# Patient Record
Sex: MALE | Race: BLACK OR AFRICAN AMERICAN | NOT HISPANIC OR LATINO | Employment: OTHER | URBAN - METROPOLITAN AREA
[De-identification: names, ages, dates, MRNs, and addresses within clinical notes are randomized per-mention and may not be internally consistent; named-entity substitution may affect disease eponyms.]

---

## 2017-02-16 ENCOUNTER — APPOINTMENT (OUTPATIENT)
Dept: ENDOCRINOLOGY | Facility: MEDICAL CENTER | Age: 52
End: 2017-02-16
Payer: MEDICAID

## 2017-03-05 ENCOUNTER — HOSPITAL ENCOUNTER (EMERGENCY)
Facility: MEDICAL CENTER | Age: 52
End: 2017-03-05
Attending: EMERGENCY MEDICINE
Payer: MEDICAID

## 2017-03-05 VITALS
DIASTOLIC BLOOD PRESSURE: 98 MMHG | SYSTOLIC BLOOD PRESSURE: 162 MMHG | WEIGHT: 216.49 LBS | BODY MASS INDEX: 25.56 KG/M2 | RESPIRATION RATE: 16 BRPM | HEIGHT: 77 IN | OXYGEN SATURATION: 96 % | TEMPERATURE: 97.8 F | HEART RATE: 100 BPM

## 2017-03-05 DIAGNOSIS — L84 PRE-ULCERATIVE CORN OR CALLOUS: ICD-10-CM

## 2017-03-05 DIAGNOSIS — G89.29 CHRONIC BILATERAL LOW BACK PAIN WITHOUT SCIATICA: ICD-10-CM

## 2017-03-05 DIAGNOSIS — M54.50 CHRONIC BILATERAL LOW BACK PAIN WITHOUT SCIATICA: ICD-10-CM

## 2017-03-05 PROCEDURE — 99283 EMERGENCY DEPT VISIT LOW MDM: CPT

## 2017-03-05 PROCEDURE — A9270 NON-COVERED ITEM OR SERVICE: HCPCS | Performed by: EMERGENCY MEDICINE

## 2017-03-05 PROCEDURE — 700102 HCHG RX REV CODE 250 W/ 637 OVERRIDE(OP): Performed by: EMERGENCY MEDICINE

## 2017-03-05 RX ORDER — TRAMADOL HYDROCHLORIDE 50 MG/1
50 TABLET ORAL ONCE
Status: COMPLETED | OUTPATIENT
Start: 2017-03-05 | End: 2017-03-05

## 2017-03-05 RX ADMIN — TRAMADOL HYDROCHLORIDE 50 MG: 50 TABLET, COATED ORAL at 09:58

## 2017-03-05 ASSESSMENT — PAIN SCALES - GENERAL
PAINLEVEL_OUTOF10: 10
PAINLEVEL_OUTOF10: 10

## 2017-03-05 ASSESSMENT — ENCOUNTER SYMPTOMS
ABDOMINAL PAIN: 0
CHILLS: 0
FALLS: 0
BACK PAIN: 1
FEVER: 0
SHORTNESS OF BREATH: 0

## 2017-03-05 NOTE — ED AVS SNAPSHOT
[employer] Access Code: Activation code not generated  Current [employer] Status: Patient Declined    [employer] [place]  A secure, online tool to manage your health information     [employer]® is a secure, online tool that connects you to your personalized health information from the privacy of your home -- day or night - making it very easy for you to manage your healthcare. Once the activation process is completed, you can even access your medical information using the [employer] urvashi, which is available for free in the Apple Urvashi store or Google Play store.     [employer] provides the following levels of access (as shown below):   My Chart Features   [facility] Primary Care Doctor [facility]  Specialists [facility]  Urgent  Care Non-[facility]  Primary Care  Doctor   Email your healthcare team securely and privately 24/7 X X X X   Manage appointments: schedule your next appointment; view details of past/upcoming appointments X      Request prescription refills. X      View recent personal medical records, including lab and immunizations X X X X   View health record, including health history, allergies, medications X X X X   Read reports about your outpatient visits, procedures, consult and ER notes X X X X   See your discharge summary, which is a recap of your hospital and/or ER visit that includes your diagnosis, lab results, and care plan. X X       How to register for [employer]:  1. Go to  [email or web address].  2. Click on the Sign Up Now box, which takes you to the New Member Sign Up page. You will need to provide the following information:  a. Enter your [employer] Access Code exactly as it appears at the top of this page. (You will not need to use this code after you’ve completed the sign-up process. If you do not sign up before the expiration date, you must request a new code.)   b. Enter your date of birth.   c. Enter your home email address.   d. Click Submit, and follow the next screen’s instructions.  3. Create a [employer] ID.  This will be your Private Practice login ID and cannot be changed, so think of one that is secure and easy to remember.  4. Create a Private Practice password. You can change your password at any time.  5. Enter your Password Reset Question and Answer. This can be used at a later time if you forget your password.   6. Enter your e-mail address. This allows you to receive e-mail notifications when new information is available in Private Practice.  7. Click Sign Up. You can now view your health information.    For assistance activating your Private Practice account, call [phone]

## 2017-03-05 NOTE — ED AVS SNAPSHOT
3/5/2017          Wagner Kenney  [address]  [place] [address]    Dear Wagner:    [facility] wants to ensure your discharge home is safe and you or your loved ones have had all your questions answered regarding your care after you leave the hospital.    You may receive a telephone call within two days of your discharge.  This call is to make certain you understand your discharge instructions as well as ensure we provided you with the best care possible during your stay with us.     The call will only last approximately 3-5 minutes and will be done by a nurse.    Once again, we want to ensure your discharge home is safe and that you have a clear understanding of any next steps in your care.  If you have any questions or concerns, please do not hesitate to contact us, we are here for you.  Thank you for choosing [facility] for your healthcare needs.    Sincerely,    Jose Villatoro    [facility]

## 2017-03-05 NOTE — ED PROVIDER NOTES
ED Provider Note    Scribed for Palak Goldstein D.O. by Lizette Monroy. 3/5/2017, 9:31 AM.    Primary care provider: Eleuterio Lara M.D.  Means of arrival: Walk-In  History obtained from: Patient  History limited by: None    CHIEF COMPLAINT  Chief Complaint   Patient presents with   • Toe Pain     lt toe   • Back Pain       HPI  Wagner Kenney is a 51 y.o. male who presents to the Emergency Department complaining of bilateral left greater than right pain between his 4th and 5th toes,  onset a couple of days ago. He also complains of chronic low back pain. There's been no change in recent change in these symptoms. He denies bowel or bladder incontinence or retention. He had no recent fever. He denies any recent trauma to the toe or back. He has a history of hypertension, chronic lower back pain, chronic shoulder pain, diabetes, COPD, and kidney disease.    REVIEW OF SYSTEMS  Review of Systems   Constitutional: Negative for fever and chills.   Respiratory: Negative for shortness of breath.    Cardiovascular: Negative for chest pain.   Gastrointestinal: Negative for abdominal pain.   Musculoskeletal: Positive for back pain (chronic). Negative for falls.        + left pinky toe pain   E.    PAST MEDICAL HISTORY   has a past medical history of HTN (hypertension); Chronic LBP; Chronic shoulder pain; DM (diabetes mellitus) (CMS-HCC); COPD (chronic obstructive pulmonary disease) (CMS-HCC) (3/31/10); and Kidney disease.    SURGICAL HISTORY   has past surgical history that includes other orthopedic surgery (knee).    SOCIAL HISTORY  Social History   Substance Use Topics   • Smoking status: Current Every Day Smoker -- 1.00 packs/day for 25 years     Types: Cigarettes     Last Attempt to Quit: 05/26/2012   • Smokeless tobacco: Never Used   • Alcohol Use: Yes      Comment: last night 3-4 drinks      History   Drug Use No       FAMILY HISTORY  Family History   Problem Relation Age of Onset   • Hypertension Mother    • Diabetes  Father    • Hypertension Father    • Diabetes Sister        CURRENT MEDICATIONS  No current facility-administered medications on file prior to encounter.     Current Outpatient Prescriptions on File Prior to Encounter   Medication Sig Dispense Refill   • lisinopril (PRINIVIL) 10 MG Tab Take 1 Tab by mouth every day. 30 Tab 3   • atorvastatin (LIPITOR) 20 MG Tab Take 1 Tab by mouth every day. 30 Tab 3   • hydrocodone-acetaminophen (NORCO) 5-325 MG Tab per tablet Take 1-2 Tabs by mouth every four hours as needed. 12 Tab 0   • ranitidine (ZANTAC) 300 MG tablet TAKE 1 TABLET BY MOUTH 2 TIMES A DAY. 60 Tab 3   • RaNITidine HCl 300 MG Cap Take 1 Cap by mouth 2 Times a Day.     • oxycodone-acetaminophen (PERCOCET) 5-325 MG Tab Take 1-2 Tabs by mouth every four hours as needed. 20 Tab 0   • ondansetron (ZOFRAN ODT) 4 MG TABLET DISPERSIBLE Take 1 Tab by mouth every 8 hours as needed for Nausea/Vomiting. 20 Tab 0   • budesonide-formoterol (SYMBICORT) 160-4.5 MCG/ACT Aerosol Inhale 2 Puffs by mouth 2 Times a Day. 10.2 Inhaler 6   • tiotropium (SPIRIVA) 18 MCG Cap Inhale 1 Cap by mouth every day. 30 Cap 6   • NOVOLOG, insulin aspart, (NOVOLOG FLEXPEN) 100 UNIT/ML Solution Pen-injector injection Inject 5-15 Units as instructed 3 times a day before meals. 5 PEN 11   • insulin glargine (LANTUS SOLOSTAR) 100 UNIT/ML Solution Pen-injector injection Inject 40 Units as instructed every evening. 5 PEN 11   • albuterol (VENTOLIN OR PROVENTIL) 108 (90 BASE) MCG/ACT Aero Soln inhalation aerosol Inhale 2 Puffs by mouth every 6 hours as needed for Shortness of Breath. 8.5 g 6   • amlodipine (NORVASC) 10 MG Tab Take 10 mg by mouth every day.     • cyclobenzaprine (FLEXERIL) 10 MG Tab Take 10 mg by mouth 2 times a day as needed for Mild Pain.     • multivitamin (THERAGRAN) Tab Take 1 Tab by mouth every day.       ALLERGIES  Allergies   Allergen Reactions   • Apple Swelling     Per patient: swelling of mouth and jittery feeling.  Apple allergy  "to raw apples; apple juice and applesauce okay per patient   • Asa [Aspirin]      \"funny taste in my mouth. My stomach has an 'empty' feeling.\"   • Metformin      Pt states he \"cramps up\"       PHYSICAL EXAM  VITAL SIGNS: /98 mmHg  Pulse 112  Temp(Src) 36.6 °C (97.8 °F)  Resp 16  Ht 1.956 m (6' 5\")  Wt 98.2 kg (216 lb 7.9 oz)  BMI 25.67 kg/m2  SpO2 97%  Vitals reviewed.  Constitutional: Patient is oriented to person, place, and time. Appears well-developed and well-nourished. No distress.    Cardiovascular: tachycardic, regular rhythm and normal heart sounds. Normal peripheral pulses, bilateral lower extremity.  Pulmonary/Chest: Effort normal and breath sounds normal. No respiratory distress, no wheezes, rhonchi, or rales.  Musculoskeletal: No edema and no tenderness. Bilateral paraspinal lumbar tenderness.  Skin: Skin is warm and dry. No erythema. No pallor. Thick callused feet and toes. In the web spaces bilaterally left greater than right the callus extends between fourth and fifth toes. No cellulitis or swelling.  Psychiatric: Patient has a normal mood and affect.     COURSE & MEDICAL DECISION MAKING  Pertinent Labs & Imaging studies reviewed. (See chart for details)    Obtained and reviewed past medical records from 12/28/16 which indicated the patient was seen for arm pain.    9:31 AM - Patient seen and examined at bedside. Patient will be treated with 50mg Ultram. The patient was informed that the toe does not look infected . I do not see indication for antibiotics at this time. The skin changes appear to be chronic. It was discussed with the patient that he does have calluses and that these are chronic and do not require antibiotics. He was informed that he will be given medication for the pain and then he is able to be discharged home but to return for worsening symptoms. He understood and verbalized agreement.     I have reviewed the patient's Prescription Monitoring Program. Last two pain " medications scripts from [facility].    Patient's reevaluated. He is resting comfortably. His heart rate has normalized. No further complaints. At this time, I feel he is safe for discharge to her home and no further emergent intervention is necessary. I advised the patient to follow up with his primary care doctor for his chronic pain issues in addition, could benefit from tending to the chronic calluses on his feet.    The patient will return for new or worsening symptoms and is stable at the time of discharge.    The patient is referred to a primary physician for blood pressure management, diabetic screening, and for all other preventative health concerns.    DISPOSITION:  Patient will be discharged home in stable condition.    FOLLOW UP:  Eleuterio Lara M.D.  [address] [id number] [phone]    Schedule an appointment as soon as possible for a visit      [facility], Emergency Dept  [address] [address] [phone]    If symptoms worsen      OUTPATIENT MEDICATIONS:  New Prescriptions    No medications on file     FINAL IMPRESSION  1. Pre-ulcerative corn or callous    2. Chronic bilateral low back pain without sciatica          Lizette RIVERO (Scribe), am scribing for, and in the presence of, Palak Goldstein D.O..    Electronically signed by: Lizette Monroy (Enrike), 3/5/2017    Palak RIVERO D.O. personally performed the services described in this documentation, as scribed by Lizette Monroy in my presence, and it is both accurate and complete.    The note accurately reflects work and decisions made by me.  Palak Goldstein  3/5/2017  10:52 AM

## 2017-03-05 NOTE — ED NOTES
"PT ambulated to triage c/o back pain and toe pain. PT denies trauma   Chief Complaint   Patient presents with   • Toe Pain     lt toe   • Back Pain     Blood pressure 162/98, pulse 112, temperature 36.6 °C (97.8 °F), resp. rate 16, height 1.956 m (6' 5\"), weight 98.2 kg (216 lb 7.9 oz), SpO2 97 %.    "

## 2017-03-05 NOTE — ED NOTES
Pt received discharge instructions and instructed to follow up with family medicine. Pt walked himself out.

## 2017-03-05 NOTE — ED AVS SNAPSHOT
Home Care Instructions                                                                                                                Wagner Kenney   MRN: [id number]    Department:  [facility], Emergency Dept   Date of Visit:  3/5/2017            [facility], Emergency Dept    [address] [address]    Phone:  [phone]      You were seen by     Palak Goldstein D.O.      Your Diagnosis Was     Pre-ulcerative corn or callous     L84 bilateral webspace 4th and 5th toes      These are the medications you received during your hospitalization from 03/05/2017 0905 to 03/05/2017 1056     Date/Time Order Dose Route Action    03/05/2017 0958 tramadol (ULTRAM) 50 MG tablet 50 mg 50 mg Oral Given      Follow-up Information     1. Schedule an appointment as soon as possible for a visit with Eleuterio Lara M.D..    Specialty:  Family Medicine    Contact information    [address] [address] [phone]          2. Follow up with [facility], Emergency Dept.    Specialty:  Emergency Medicine    Why:  If symptoms worsen    Contact information    [address] [place] [address] [phone]      Medication Information     Review all of your home medications and newly ordered medications with your primary doctor and/or pharmacist as soon as possible. Follow medication instructions as directed by your doctor and/or pharmacist.     Please keep your complete medication list with you and share with your physician. Update the information when medications are discontinued, doses are changed, or new medications (including over-the-counter products) are added; and carry medication information at all times in the event of emergency situations.               Medication List      ASK your doctor about these medications        Instructions    albuterol 108 (90 BASE) MCG/ACT Aers inhalation aerosol    Inhale 2 Puffs by mouth every 6 hours as needed  for Shortness of Breath.   Dose:  2 Puff       amlodipine 10 MG Tabs   Commonly known as:  NORVASC    Take 10 mg by mouth every day.   Dose:  10 mg       atorvastatin 20 MG Tabs   Commonly known as:  LIPITOR    Take 1 Tab by mouth every day.   Dose:  20 mg       budesonide-formoterol 160-4.5 MCG/ACT Aero   Commonly known as:  SYMBICORT    Inhale 2 Puffs by mouth 2 Times a Day.   Dose:  2 Puff       cyclobenzaprine 10 MG Tabs   Commonly known as:  FLEXERIL    Take 10 mg by mouth 2 times a day as needed for Mild Pain.   Dose:  10 mg       hydrocodone-acetaminophen 5-325 MG Tabs per tablet   Commonly known as:  NORCO    Take 1-2 Tabs by mouth every four hours as needed.   Dose:  1-2 Tab       insulin glargine 100 UNIT/ML Sopn injection   Commonly known as:  LANTUS SOLOSTAR    Inject 40 Units as instructed every evening.   Dose:  40 Units       lisinopril 10 MG Tabs   Commonly known as:  PRINIVIL    Take 1 Tab by mouth every day.   Dose:  10 mg       multivitamin Tabs    Take 1 Tab by mouth every day.   Dose:  1 Tab       NOVOLOG (insulin aspart) 100 UNIT/ML Sopn injection   Commonly known as:  NOVOLOG FLEXPEN    Inject 5-15 Units as instructed 3 times a day before meals.   Dose:  5-15 Units       ondansetron 4 MG Tbdp   Commonly known as:  ZOFRAN ODT    Take 1 Tab by mouth every 8 hours as needed for Nausea/Vomiting.   Dose:  4 mg       oxycodone-acetaminophen 5-325 MG Tabs   Commonly known as:  PERCOCET    Take 1-2 Tabs by mouth every four hours as needed.   Dose:  1-2 Tab       * RaNITidine HCl 300 MG Caps    Take 1 Cap by mouth 2 Times a Day.   Dose:  1 Cap       * ranitidine 300 MG tablet   Commonly known as:  ZANTAC    TAKE 1 TABLET BY MOUTH 2 TIMES A DAY.       tiotropium 18 MCG Caps   Commonly known as:  SPIRIVA    Inhale 1 Cap by mouth every day.   Dose:  18 mcg       * Notice:  This list has 2 medication(s) that are the same as other medications prescribed for you. Read the directions carefully, and ask your  doctor or other care provider to review them with you.              Discharge Instructions       Corns and Calluses  Corns are small areas of thickened skin that occur on the top, sides, or tip of a toe. They contain a cone-shaped core with a point that can press on a nerve below. This causes pain. Calluses are areas of thickened skin that can occur anywhere on the body including hands, fingers, palms, soles of the feet, and heels. Calluses are usually larger than corns.   CAUSES   Corns and calluses are caused by rubbing (friction) or pressure, such as from shoes that are too tight or do not fit properly.   RISK FACTORS  Corns are more likely to develop in people who have toe deformities, such as hammer toes.  Since calluses can occur with friction to any area of the skin, calluses are more likely to develop in people who:   · Work with their hands.  · Wear shoes that fit poorly, shoes that are too tight, or shoes that are high-heeled.  · Have toes deformities.  SYMPTOMS  Symptoms of a corn or callus include:  · A hard growth on the skin.    · Pain or tenderness under the skin.    · Redness and swelling.    · Increased discomfort while wearing tight-fitting shoes.  DIAGNOSIS   Corns and calluses may be diagnosed with a medical history and physical exam.   TREATMENT   Corns and calluses may be treated with:  · Removing the cause of the friction or pressure. This may include:  · Changing your shoes.  · Wearing shoe inserts (orthotics) or other protective layers in your shoes, such as a corn pad.  · Wearing gloves.  · Medicines to help soften skin in the hardened, thickened areas.  · Reducing the size of the corn or callus by removing the dead layers of skin.  · Antibiotic medicines to treat infection.  · Surgery, if a toe deformity is the cause.  HOME CARE INSTRUCTIONS   · Take medicines only as directed by your health care provider.  · If you were prescribed an antibiotic, finish all of it even if you start to feel  better.  · Wear shoes that fit well. Avoid wearing high-heeled shoes and shoes that are too tight or too loose.  · Wear any padding, protective layers, gloves, or orthotics as directed by your health care provider.  · Soak your hands or feet and then use a file or pumice stone to soften your corn or callus. Do this as directed by your health care provider.  · Check your corn or callus every day for signs of infection. Watch for:  ¨ Redness, swelling, or pain.  ¨ Fluid, blood, or pus.  SEEK MEDICAL CARE IF:   · Your symptoms do not improve with treatment.  · You have increased redness, swelling, or pain at the site of your corn or callus.  · You have fluid, blood, or pus coming from your corn or callus.  · You have new symptoms.     This information is not intended to replace advice given to you by your health care provider. Make sure you discuss any questions you have with your health care provider.     Document Released: 09/23/2005 Document Revised: 05/03/2016 Document Reviewed: 12/14/2015  [employer] Interactive Patient Education ©2016 [employer]    Chronic Back Pain   When back pain lasts longer than 3 months, it is called chronic back pain. People with chronic back pain often go through certain periods that are more intense (flare-ups).   CAUSES  Chronic back pain can be caused by wear and tear (degeneration) on different structures in your back. These structures include:  · The bones of your spine (vertebrae) and the joints surrounding your spinal cord and nerve roots (facets).  · The strong, fibrous tissues that connect your vertebrae (ligaments).  Degeneration of these structures may result in pressure on your nerves. This can lead to constant pain.  HOME CARE INSTRUCTIONS  · Avoid bending, heavy lifting, prolonged sitting, and activities which make the problem worse.  · Take brief periods of rest throughout the day to reduce your pain. Lying down or standing usually is better than sitting while you are  resting.  · Take over-the-counter or prescription medicines only as directed by your caregiver.  SEEK IMMEDIATE MEDICAL CARE IF:   · You have weakness or numbness in one of your legs or feet.  · You have trouble controlling your bladder or bowels.  · You have nausea, vomiting, abdominal pain, shortness of breath, or fainting.     This information is not intended to replace advice given to you by your health care provider. Make sure you discuss any questions you have with your health care provider.     Document Released: 01/25/2006 Document Revised: 03/11/2013 Document Reviewed: 12/01/2012  [employer] Interactive Patient Education ©2016 [employer]              Patient Information     Patient Information    Following emergency treatment: all patient requiring follow-up care must return either to a private physician or a clinic if your condition worsens before you are able to obtain further medical attention, please return to the emergency room.     Billing Information    At [facility], we work to make the billing process streamlined for our patients.  Our Representatives are here to answer any questions you may have regarding your hospital bill.  If you have insurance coverage and have supplied your insurance information to us, we will submit a claim to your insurer on your behalf.  Should you have any questions regarding your bill, we can be reached online or by phone as follows:  Online: You are able pay your bills online or live chat with our representatives about any billing questions you may have. We are here to help Monday - Friday from 8:00am to 7:30pm and 9:00am - 12:00pm on Saturdays.  Please visit [email or web address]  for more information.   Phone:  [phone] or [phone]    Please note that your emergency physician, surgeon, pathologist, radiologist, anesthesiologist, and other specialists are not employed by [facility] and will therefore bill separately for their  services.  Please contact them directly for any questions concerning their bills at the numbers below:     Emergency Physician Services:  [phone]  [facility]:  [phone]  [facility]:  [phone]  [facility]:  [phone]    1. Your final bill may vary from the amount quoted upon discharge if all procedures are not complete at that time, or if your doctor has additional procedures of which we are not aware. You will receive an additional bill if you return to the Emergency Department at [facility] for suture removal regardless of the facility of which the sutures were placed.     2. Please arrange for settlement of this account at the emergency registration.    3. All self-pay accounts are due in full at the time of treatment.  If you are unable to meet this obligation then payment is expected within 4-5 days.     4. If you have had radiology studies (CT, X-ray, Ultrasound, MRI), you have received a preliminary result during your emergency department visit. Please contact the radiology department [phone] to receive a copy of your final result. Please discuss the Final result with your primary physician or with the follow up physician provided.     Crisis Hotline:  [place] Crisis Hotline:  8-347-CIIPMXZ or 1-654.921.3100  [place] Crisis Hotline:    1-199.941.3168 or 352-124-8741         ED Discharge Follow Up Questions    1. In order to provide you with very good care, we would like to follow up with a phone call in the next few days.  May we have your permission to contact you?     YES /  NO    2. What is the best phone number to call you? (       )_____-__________    3. What is the best time to call you?      Morning  /  Afternoon  /  Evening                   Patient Signature:  ____________________________________________________________    Date:  ____________________________________________________________      Your [facility]     Mar 13,  2017 10:00 AM   Established Patient with Chato Mars PA-C   [facility] ([facility])    [address]  [facility] [id number] [phone]           You will be receiving a confirmation call a few days before your appointment from our automated call confirmation system.            Apr 24, 2017  1:30 PM   New Patient with Won Atwood M.D.   [facility] (--)    [address]  [facility] [id number] [phone]           Please bring Photo ID, Insurance Cards, All Medication Bottles and copies of any legal documents (such as Living Will, Power of ) If speaking a language besides English please bring an adult . Please arrive 30 minutes prior for check in and registration. You will be receiving a confirmation call a few days before your appointment from our automated call confirmation system.

## 2017-03-05 NOTE — DISCHARGE INSTRUCTIONS
Corns and Calluses  Corns are small areas of thickened skin that occur on the top, sides, or tip of a toe. They contain a cone-shaped core with a point that can press on a nerve below. This causes pain. Calluses are areas of thickened skin that can occur anywhere on the body including hands, fingers, palms, soles of the feet, and heels. Calluses are usually larger than corns.   CAUSES   Corns and calluses are caused by rubbing (friction) or pressure, such as from shoes that are too tight or do not fit properly.   RISK FACTORS  Corns are more likely to develop in people who have toe deformities, such as hammer toes.  Since calluses can occur with friction to any area of the skin, calluses are more likely to develop in people who:   · Work with their hands.  · Wear shoes that fit poorly, shoes that are too tight, or shoes that are high-heeled.  · Have toes deformities.  SYMPTOMS  Symptoms of a corn or callus include:  · A hard growth on the skin.    · Pain or tenderness under the skin.    · Redness and swelling.    · Increased discomfort while wearing tight-fitting shoes.  DIAGNOSIS   Corns and calluses may be diagnosed with a medical history and physical exam.   TREATMENT   Corns and calluses may be treated with:  · Removing the cause of the friction or pressure. This may include:  · Changing your shoes.  · Wearing shoe inserts (orthotics) or other protective layers in your shoes, such as a corn pad.  · Wearing gloves.  · Medicines to help soften skin in the hardened, thickened areas.  · Reducing the size of the corn or callus by removing the dead layers of skin.  · Antibiotic medicines to treat infection.  · Surgery, if a toe deformity is the cause.  HOME CARE INSTRUCTIONS   · Take medicines only as directed by your health care provider.  · If you were prescribed an antibiotic, finish all of it even if you start to feel better.  · Wear shoes that fit well. Avoid wearing high-heeled shoes and shoes that are too tight  or too loose.  · Wear any padding, protective layers, gloves, or orthotics as directed by your health care provider.  · Soak your hands or feet and then use a file or pumice stone to soften your corn or callus. Do this as directed by your health care provider.  · Check your corn or callus every day for signs of infection. Watch for:  ¨ Redness, swelling, or pain.  ¨ Fluid, blood, or pus.  SEEK MEDICAL CARE IF:   · Your symptoms do not improve with treatment.  · You have increased redness, swelling, or pain at the site of your corn or callus.  · You have fluid, blood, or pus coming from your corn or callus.  · You have new symptoms.     This information is not intended to replace advice given to you by your health care provider. Make sure you discuss any questions you have with your health care provider.     Document Released: 09/23/2005 Document Revised: 05/03/2016 Document Reviewed: 12/14/2015  [employer] Interactive Patient Education ©2016 [employer]    Chronic Back Pain   When back pain lasts longer than 3 months, it is called chronic back pain. People with chronic back pain often go through certain periods that are more intense (flare-ups).   CAUSES  Chronic back pain can be caused by wear and tear (degeneration) on different structures in your back. These structures include:  · The bones of your spine (vertebrae) and the joints surrounding your spinal cord and nerve roots (facets).  · The strong, fibrous tissues that connect your vertebrae (ligaments).  Degeneration of these structures may result in pressure on your nerves. This can lead to constant pain.  HOME CARE INSTRUCTIONS  · Avoid bending, heavy lifting, prolonged sitting, and activities which make the problem worse.  · Take brief periods of rest throughout the day to reduce your pain. Lying down or standing usually is better than sitting while you are resting.  · Take over-the-counter or prescription medicines only as directed by your caregiver.  SEEK  IMMEDIATE MEDICAL CARE IF:   · You have weakness or numbness in one of your legs or feet.  · You have trouble controlling your bladder or bowels.  · You have nausea, vomiting, abdominal pain, shortness of breath, or fainting.     This information is not intended to replace advice given to you by your health care provider. Make sure you discuss any questions you have with your health care provider.     Document Released: 01/25/2006 Document Revised: 03/11/2013 Document Reviewed: 12/01/2012  [employer] Interactive Patient Education ©2016 [employer]

## 2017-03-12 ENCOUNTER — APPOINTMENT (OUTPATIENT)
Dept: RADIOLOGY | Facility: MEDICAL CENTER | Age: 52
End: 2017-03-12
Attending: EMERGENCY MEDICINE
Payer: MEDICAID

## 2017-03-12 ENCOUNTER — HOSPITAL ENCOUNTER (EMERGENCY)
Facility: MEDICAL CENTER | Age: 52
End: 2017-03-13
Attending: EMERGENCY MEDICINE
Payer: MEDICAID

## 2017-03-12 DIAGNOSIS — R45.851 SUICIDAL IDEATION: ICD-10-CM

## 2017-03-12 DIAGNOSIS — Z91.148 NON COMPLIANCE W MEDICATION REGIMEN: ICD-10-CM

## 2017-03-12 DIAGNOSIS — E11.65 TYPE 2 DIABETES MELLITUS WITH HYPERGLYCEMIA, WITH LONG-TERM CURRENT USE OF INSULIN (HCC): ICD-10-CM

## 2017-03-12 DIAGNOSIS — I10 ESSENTIAL HYPERTENSION: ICD-10-CM

## 2017-03-12 DIAGNOSIS — Z79.4 TYPE 2 DIABETES MELLITUS WITH HYPERGLYCEMIA, WITH LONG-TERM CURRENT USE OF INSULIN (HCC): ICD-10-CM

## 2017-03-12 LAB
ALBUMIN SERPL BCP-MCNC: 4.2 G/DL (ref 3.2–4.9)
ALBUMIN/GLOB SERPL: 1.6 G/DL
ALP SERPL-CCNC: 84 U/L (ref 30–99)
ALT SERPL-CCNC: 25 U/L (ref 2–50)
AMPHET UR QL SCN: NEGATIVE
ANION GAP SERPL CALC-SCNC: 9 MMOL/L (ref 0–11.9)
APTT PPP: 23.5 SEC (ref 24.7–36)
AST SERPL-CCNC: 23 U/L (ref 12–45)
BARBITURATES UR QL SCN: NEGATIVE
BASOPHILS # BLD AUTO: 0.5 % (ref 0–1.8)
BASOPHILS # BLD: 0.04 K/UL (ref 0–0.12)
BENZODIAZ UR QL SCN: NEGATIVE
BILIRUB SERPL-MCNC: 0.8 MG/DL (ref 0.1–1.5)
BNP SERPL-MCNC: 51 PG/ML (ref 0–100)
BUN SERPL-MCNC: 15 MG/DL (ref 8–22)
BZE UR QL SCN: POSITIVE
CALCIUM SERPL-MCNC: 9.2 MG/DL (ref 8.5–10.5)
CANNABINOIDS UR QL SCN: POSITIVE
CHLORIDE SERPL-SCNC: 99 MMOL/L (ref 96–112)
CO2 SERPL-SCNC: 20 MMOL/L (ref 20–33)
CREAT SERPL-MCNC: 1.15 MG/DL (ref 0.5–1.4)
EOSINOPHIL # BLD AUTO: 0.15 K/UL (ref 0–0.51)
EOSINOPHIL NFR BLD: 1.7 % (ref 0–6.9)
ERYTHROCYTE [DISTWIDTH] IN BLOOD BY AUTOMATED COUNT: 41.1 FL (ref 35.9–50)
GFR SERPL CREATININE-BSD FRML MDRD: >60 ML/MIN/1.73 M 2
GLOBULIN SER CALC-MCNC: 2.7 G/DL (ref 1.9–3.5)
GLUCOSE BLD-MCNC: 211 MG/DL (ref 65–99)
GLUCOSE BLD-MCNC: 289 MG/DL (ref 65–99)
GLUCOSE BLD-MCNC: 376 MG/DL (ref 65–99)
GLUCOSE SERPL-MCNC: 303 MG/DL (ref 65–99)
HCT VFR BLD AUTO: 49.4 % (ref 42–52)
HGB BLD-MCNC: 16.8 G/DL (ref 14–18)
IMM GRANULOCYTES # BLD AUTO: 0.03 K/UL (ref 0–0.11)
IMM GRANULOCYTES NFR BLD AUTO: 0.3 % (ref 0–0.9)
INR PPP: 0.87 (ref 0.87–1.13)
LIPASE SERPL-CCNC: 55 U/L (ref 11–82)
LYMPHOCYTES # BLD AUTO: 3.13 K/UL (ref 1–4.8)
LYMPHOCYTES NFR BLD: 36.1 % (ref 22–41)
MCH RBC QN AUTO: 28.7 PG (ref 27–33)
MCHC RBC AUTO-ENTMCNC: 34 G/DL (ref 33.7–35.3)
MCV RBC AUTO: 84.3 FL (ref 81.4–97.8)
MDMA UR QL SCN: NEGATIVE
METHADONE UR QL SCN: NEGATIVE
MONOCYTES # BLD AUTO: 0.75 K/UL (ref 0–0.85)
MONOCYTES NFR BLD AUTO: 8.7 % (ref 0–13.4)
NEUTROPHILS # BLD AUTO: 4.56 K/UL (ref 1.82–7.42)
NEUTROPHILS NFR BLD: 52.7 % (ref 44–72)
NRBC # BLD AUTO: 0 K/UL
NRBC BLD AUTO-RTO: 0 /100 WBC
OPIATES UR QL SCN: NEGATIVE
OXYCODONE UR QL SCN: NEGATIVE
PCP UR QL SCN: NEGATIVE
PLATELET # BLD AUTO: 234 K/UL (ref 164–446)
PMV BLD AUTO: 10.7 FL (ref 9–12.9)
POC BREATHALIZER: 0.01 PERCENT (ref 0–0.01)
POTASSIUM SERPL-SCNC: 3.9 MMOL/L (ref 3.6–5.5)
PROPOXYPH UR QL SCN: NEGATIVE
PROT SERPL-MCNC: 6.9 G/DL (ref 6–8.2)
PROTHROMBIN TIME: 12.1 SEC (ref 12–14.6)
RBC # BLD AUTO: 5.86 M/UL (ref 4.7–6.1)
SODIUM SERPL-SCNC: 128 MMOL/L (ref 135–145)
TROPONIN I SERPL-MCNC: 0.04 NG/ML (ref 0–0.04)
WBC # BLD AUTO: 8.7 K/UL (ref 4.8–10.8)

## 2017-03-12 PROCEDURE — 71010 DX-CHEST-PORTABLE (1 VIEW): CPT

## 2017-03-12 PROCEDURE — 84484 ASSAY OF TROPONIN QUANT: CPT

## 2017-03-12 PROCEDURE — 93005 ELECTROCARDIOGRAM TRACING: CPT | Performed by: EMERGENCY MEDICINE

## 2017-03-12 PROCEDURE — 302970 POC BREATHALIZER

## 2017-03-12 PROCEDURE — 83880 ASSAY OF NATRIURETIC PEPTIDE: CPT

## 2017-03-12 PROCEDURE — 99285 EMERGENCY DEPT VISIT HI MDM: CPT

## 2017-03-12 PROCEDURE — 96372 THER/PROPH/DIAG INJ SC/IM: CPT

## 2017-03-12 PROCEDURE — 83690 ASSAY OF LIPASE: CPT

## 2017-03-12 PROCEDURE — 85610 PROTHROMBIN TIME: CPT

## 2017-03-12 PROCEDURE — 700102 HCHG RX REV CODE 250 W/ 637 OVERRIDE(OP): Performed by: EMERGENCY MEDICINE

## 2017-03-12 PROCEDURE — A9270 NON-COVERED ITEM OR SERVICE: HCPCS | Performed by: EMERGENCY MEDICINE

## 2017-03-12 PROCEDURE — 82962 GLUCOSE BLOOD TEST: CPT | Mod: 91

## 2017-03-12 PROCEDURE — 90791 PSYCH DIAGNOSTIC EVALUATION: CPT

## 2017-03-12 PROCEDURE — 85730 THROMBOPLASTIN TIME PARTIAL: CPT

## 2017-03-12 PROCEDURE — 80307 DRUG TEST PRSMV CHEM ANLYZR: CPT

## 2017-03-12 PROCEDURE — 85025 COMPLETE CBC W/AUTO DIFF WBC: CPT

## 2017-03-12 PROCEDURE — 80053 COMPREHEN METABOLIC PANEL: CPT

## 2017-03-12 RX ORDER — RANITIDINE 150 MG/1
300 TABLET ORAL 2 TIMES DAILY PRN
COMMUNITY
End: 2017-04-05 | Stop reason: SDUPTHER

## 2017-03-12 RX ORDER — LISINOPRIL 10 MG/1
10 TABLET ORAL
Status: DISCONTINUED | OUTPATIENT
Start: 2017-03-12 | End: 2017-03-13 | Stop reason: HOSPADM

## 2017-03-12 RX ORDER — BUDESONIDE AND FORMOTEROL FUMARATE DIHYDRATE 160; 4.5 UG/1; UG/1
2 AEROSOL RESPIRATORY (INHALATION) 2 TIMES DAILY
Status: DISCONTINUED | OUTPATIENT
Start: 2017-03-12 | End: 2017-03-13 | Stop reason: HOSPADM

## 2017-03-12 RX ORDER — DEXTROSE MONOHYDRATE 25 G/50ML
25 INJECTION, SOLUTION INTRAVENOUS
Status: DISCONTINUED | OUTPATIENT
Start: 2017-03-12 | End: 2017-03-13 | Stop reason: HOSPADM

## 2017-03-12 RX ORDER — TIOTROPIUM BROMIDE 18 UG/1
18 CAPSULE ORAL; RESPIRATORY (INHALATION)
COMMUNITY
End: 2017-04-03 | Stop reason: SDUPTHER

## 2017-03-12 RX ORDER — AMLODIPINE BESYLATE 5 MG/1
10 TABLET ORAL
Status: DISCONTINUED | OUTPATIENT
Start: 2017-03-12 | End: 2017-03-13 | Stop reason: HOSPADM

## 2017-03-12 RX ORDER — ALBUTEROL SULFATE 90 UG/1
2 AEROSOL, METERED RESPIRATORY (INHALATION) EVERY 6 HOURS PRN
Status: DISCONTINUED | OUTPATIENT
Start: 2017-03-12 | End: 2017-03-13 | Stop reason: HOSPADM

## 2017-03-12 RX ORDER — CYCLOBENZAPRINE HCL 10 MG
10 TABLET ORAL 3 TIMES DAILY PRN
Status: DISCONTINUED | OUTPATIENT
Start: 2017-03-12 | End: 2017-03-13 | Stop reason: HOSPADM

## 2017-03-12 RX ORDER — INSULIN GLARGINE 100 [IU]/ML
42 INJECTION, SOLUTION SUBCUTANEOUS NIGHTLY
COMMUNITY
End: 2017-04-05 | Stop reason: SDUPTHER

## 2017-03-12 RX ORDER — INSULIN GLARGINE 100 [IU]/ML
42 INJECTION, SOLUTION SUBCUTANEOUS EVERY EVENING
Status: DISCONTINUED | OUTPATIENT
Start: 2017-03-12 | End: 2017-03-13 | Stop reason: HOSPADM

## 2017-03-12 RX ORDER — TIOTROPIUM BROMIDE 18 UG/1
1 CAPSULE ORAL; RESPIRATORY (INHALATION) DAILY
Status: DISCONTINUED | OUTPATIENT
Start: 2017-03-12 | End: 2017-03-13 | Stop reason: HOSPADM

## 2017-03-12 RX ORDER — ATORVASTATIN CALCIUM 20 MG/1
20 TABLET, FILM COATED ORAL
Status: DISCONTINUED | OUTPATIENT
Start: 2017-03-12 | End: 2017-03-13 | Stop reason: HOSPADM

## 2017-03-12 RX ADMIN — BUDESONIDE AND FORMOTEROL FUMARATE DIHYDRATE 2 PUFF: 160; 4.5 AEROSOL RESPIRATORY (INHALATION) at 22:13

## 2017-03-12 RX ADMIN — AMLODIPINE BESYLATE 10 MG: 5 TABLET ORAL at 09:43

## 2017-03-12 RX ADMIN — INSULIN LISPRO 4 UNITS: 100 INJECTION, SOLUTION INTRAVENOUS; SUBCUTANEOUS at 17:00

## 2017-03-12 RX ADMIN — BUDESONIDE AND FORMOTEROL FUMARATE DIHYDRATE 2 PUFF: 160; 4.5 AEROSOL RESPIRATORY (INHALATION) at 09:53

## 2017-03-12 RX ADMIN — ATORVASTATIN CALCIUM 20 MG: 20 TABLET, FILM COATED ORAL at 22:20

## 2017-03-12 RX ADMIN — INSULIN GLARGINE 42 UNITS: 100 INJECTION, SOLUTION SUBCUTANEOUS at 22:10

## 2017-03-12 RX ADMIN — TIOTROPIUM BROMIDE 1 CAPSULE: 18 CAPSULE ORAL; RESPIRATORY (INHALATION) at 09:54

## 2017-03-12 RX ADMIN — INSULIN LISPRO 7 UNITS: 100 INJECTION, SOLUTION INTRAVENOUS; SUBCUTANEOUS at 11:34

## 2017-03-12 RX ADMIN — LISINOPRIL 10 MG: 10 TABLET ORAL at 09:43

## 2017-03-12 RX ADMIN — INSULIN LISPRO 12 UNITS: 100 INJECTION, SOLUTION INTRAVENOUS; SUBCUTANEOUS at 22:10

## 2017-03-12 ASSESSMENT — PAIN SCALES - GENERAL
PAINLEVEL_OUTOF10: 0
PAINLEVEL_OUTOF10: 0

## 2017-03-12 NOTE — ED NOTES
No distress noted; pt resting on gurney, eyes closed, even respirations noted; pt remains in close observation in direct view of sitter in hallway

## 2017-03-12 NOTE — ED NOTES
Pt lying on his side, no distress noted; pt resting on gurney, eyes closed, even respirations noted; pt remains in close observation in direct view of sitter in hallway

## 2017-03-12 NOTE — ED PROVIDER NOTES
ED Provider Note    Scribed for Jaylon Huerta M.D. by Massiel Linton. 3/12/2017, 6:15 AM.    Primary care provider: Eleuterio Lara M.D.  Means of arrival: ambulance   History obtained from: patient   History limited by: none       CHIEF COMPLAINT  Chief Complaint   Patient presents with   • Suicidal Ideation       HPI  Wagner Kenney is a 51 y.o. male who presents to the Emergency Department for suicidal ideation onset one month ago. Patient states his thoughts of suicide have been becoming more intense over the past month. He denies history of psychiatric disorder and has never been seen by a psychiatrist before for evaluation of suicide. Patient states his suicidal ideation is related to family issues. He confirmed having a plan to drown in the [place] or overdose on pills. Patient has associated sense of depression. He drank two beers yesterday but otherwise denies drug use. Patient has a history of diabetes and hypertension for which he has been non compliant with his medications.       REVIEW OF SYSTEMS  Review of Systems   Psychiatric/Behavioral: Positive for depression and suicidal ideas.   All other systems reviewed and are negative.  C.       PAST MEDICAL HISTORY   has a past medical history of HTN (hypertension); Chronic LBP; Chronic shoulder pain; DM (diabetes mellitus) (CMS-HCC); COPD (chronic obstructive pulmonary disease) (CMS-HCC) (3/31/10); and Kidney disease.      SURGICAL HISTORY   has past surgical history that includes other orthopedic surgery (knee).      SOCIAL HISTORY  Social History   Substance Use Topics   • Smoking status: Current Every Day Smoker -- 1.00 packs/day for 25 years     Types: Cigarettes     Last Attempt to Quit: 05/26/2012   • Smokeless tobacco: Never Used   • Alcohol Use: Yes      Comment: last night 3-4 drinks      History   Drug Use No       FAMILY HISTORY  Family History   Problem Relation Age of Onset   • Hypertension Mother    • Diabetes Father    • Hypertension  "Father    • Diabetes Sister        CURRENT MEDICATIONS  Home Medications     **Home medications have not yet been reviewed for this encounter**          ALLERGIES  Allergies   Allergen Reactions   • Apple Swelling     Per patient: swelling of mouth and jittery feeling.  Apple allergy to raw apples; apple juice and applesauce okay per patient   • Asa [Aspirin]      \"funny taste in my mouth. My stomach has an 'empty' feeling.\"   • Metformin      Pt states he \"cramps up\"       PHYSICAL EXAM  VITAL SIGNS: /80 mmHg  Pulse 101  Temp(Src) 36.6 °C (97.9 °F)  Resp 18  Ht 1.93 m (6' 4\")  Wt 99.791 kg (220 lb)  BMI 26.79 kg/m2  Constitutional: Well developed, Well nourished, No acute distress, Non-toxic appearance.   HENT: Normocephalic, Atraumatic, Bilateral external ears normal, oropharynx moist, No oral exudates, Nose normal.   Eyes: Pupils are equal round and react to light, extraocular motions are intact, conjunctiva is normal, there are no signs of exudate.   Neck: Supple, no meningeal signs.  Lymphatic: No lymphadenopathy noted.   Cardiovascular: Regular rate and rhythm without murmurs gallops or rubs.   Thorax & Lungs: No respiratory distress. Breathing comfortably. Lungs are clear to auscultation bilaterally, there are no wheezes no rales. Chest wall is nontender.  Abdomen: Soft, nontender, nondistended. Bowel sounds are present.   Skin: Warm, Dry, No erythema,   Back: No tenderness, No CVA tenderness.  Musculoskeletal: Good range of motion in all major joints. No tenderness to palpation or major deformities noted. Intact distal pulses, no clubbing, no cyanosis, no edema,   Neurologic: Alert & oriented x 3, Moving all extremities. No gross abnormalities.    Psychiatric: As above.       LABS  Results for orders placed or performed during the hospital encounter of 03/12/17   URINE DRUG SCREEN   Result Value Ref Range    Amphetamines Urine Negative Negative    Barbiturates Negative Negative    Benzodiazepines " Negative Negative    Cocaine Metabolite Positive (A) Negative    Methadone Negative Negative    Ecstasy Negative Negative    Opiates Negative Negative    Oxycodone Negative Negative    Phencyclidine -Pcp Negative Negative    Propoxyphene Negative Negative    Cannabinoid Metab Positive (A) Negative   CBC WITH DIFFERENTIAL   Result Value Ref Range    WBC 8.7 4.8 - 10.8 K/uL    RBC 5.86 4.70 - 6.10 M/uL    Hemoglobin 16.8 14.0 - 18.0 g/dL    Hematocrit 49.4 42.0 - 52.0 %    MCV 84.3 81.4 - 97.8 fL    MCH 28.7 27.0 - 33.0 pg    MCHC 34.0 33.7 - 35.3 g/dL    RDW 41.1 35.9 - 50.0 fL    Platelet Count 234 164 - 446 K/uL    MPV 10.7 9.0 - 12.9 fL    Neutrophils-Polys 52.70 44.00 - 72.00 %    Lymphocytes 36.10 22.00 - 41.00 %    Monocytes 8.70 0.00 - 13.40 %    Eosinophils 1.70 0.00 - 6.90 %    Basophils 0.50 0.00 - 1.80 %    Immature Granulocytes 0.30 0.00 - 0.90 %    Nucleated RBC 0.00 /100 WBC    Neutrophils (Absolute) 4.56 1.82 - 7.42 K/uL    Lymphs (Absolute) 3.13 1.00 - 4.80 K/uL    Monos (Absolute) 0.75 0.00 - 0.85 K/uL    Eos (Absolute) 0.15 0.00 - 0.51 K/uL    Baso (Absolute) 0.04 0.00 - 0.12 K/uL    Immature Granulocytes (abs) 0.03 0.00 - 0.11 K/uL    NRBC (Absolute) 0.00 K/uL   COMP METABOLIC PANEL   Result Value Ref Range    Sodium 128 (L) 135 - 145 mmol/L    Potassium 3.9 3.6 - 5.5 mmol/L    Chloride 99 96 - 112 mmol/L    Co2 20 20 - 33 mmol/L    Anion Gap 9.0 0.0 - 11.9    Glucose 303 (H) 65 - 99 mg/dL    Bun 15 8 - 22 mg/dL    Creatinine 1.15 0.50 - 1.40 mg/dL    Calcium 9.2 8.5 - 10.5 mg/dL    AST(SGOT) 23 12 - 45 U/L    ALT(SGPT) 25 2 - 50 U/L    Alkaline Phosphatase 84 30 - 99 U/L    Total Bilirubin 0.8 0.1 - 1.5 mg/dL    Albumin 4.2 3.2 - 4.9 g/dL    Total Protein 6.9 6.0 - 8.2 g/dL    Globulin 2.7 1.9 - 3.5 g/dL    A-G Ratio 1.6 g/dL   LIPASE   Result Value Ref Range    Lipase 55 11 - 82 U/L   PROTHROMBIN TIME   Result Value Ref Range    PT 12.1 12.0 - 14.6 sec    INR 0.87 0.87 - 1.13   APTT   Result  Value Ref Range    APTT 23.5 (L) 24.7 - 36.0 sec   TROPONIN   Result Value Ref Range    Troponin I 0.04 0.00 - 0.04 ng/mL   ESTIMATED GFR   Result Value Ref Range    GFR If African American >60 >60 mL/min/1.73 m 2    GFR If Non African American >60 >60 mL/min/1.73 m 2   POC BREATHALIZER   Result Value Ref Range    POC Breathalizer 0.01 0.00 - 0.01 Percent   EKG (ER)   Result Value Ref Range    Report       [facility] Emergency Dept.    Test Date:  2017  Pt Name:    STEFFEN JOE                 Department: ER  MRN:        [id number]                      Room:       [facility]  Gender:     M                            Technician: [id number]  :        1965                   Requested By:KEVIN GUEVARA  Order #:    [id number]                    Reading MD:    Measurements  Intervals                                Axis  Rate:       93                           P:          68  KY:         148                          QRS:        -43  QRSD:       92                           T:          8  QT:         380  QTc:        473    Interpretive Statements  SINUS RHYTHM  CONSIDER LEFT ATRIAL ABNORMALITY  INFERIOR INFARCT, AGE INDETERMINATE  BORDERLINE ST ELEVATION, ANTERIOR LEADS  BASELINE WANDER IN LEAD(S) II,III,aVR,aVF,V1,V2,V3,V5,V6  Compared to ECG 2016 19:01:02  Myocardial infarct finding now present  ST (T wave) deviation still present     All labs reviewed by me.      EKG  Interpreted by me  Rhythm: normal sinus  Rate: 93 which is normal  Axis: -43  Ectopy: none  Left atrial enlargement.   Conduction: no acute   ST Segments: non specific diffuse elevation in V1-V6.   T Waves: non specific flattening in the inferior leads.   Q Waves: none  Clinical Impression: Nonspecific EKG unchanged from prior. No signs of acute MI.   Unchanged from prior.         RADIOLOGY  DX-CHEST-PORTABLE (1 VIEW)   Final Result         1. No acute cardiopulmonary abnormalities are identified.      The radiologist's  interpretation of all radiological studies have been reviewed by me.        COURSE & MEDICAL DECISION MAKING  Pertinent Labs & Imaging studies reviewed. (See chart for details)    6:15 AM - Patient seen and examined at bedside.  Ordered DX chest, CBC, CMP, lipase, PTT, APTT, troponin, BNP, POC breathalizer, urine drug screen and EKG to evaluate his symptoms.     6:33 AM Spoke with [facility] who agree to see the patient.     8:05 AM Consult with life skills who reports the patient is a candidate for a legal hold due to suicidal ideation. The appropriate paperwork was signed.     8:09 AM Pharmacy was consulted for patient's noncompliance with his medications over the past month.       Decision Making:  Patient presents for evaluation. The patient states he has not been taking his insulin for about 2 days. Sugars are slightly elevated at 300 but no signs of DKA. We will initiate back on his insulin. Blood sugars come down to 289. Since the initial initiate initiation. The patient has no signs of ketones. Patient also has a history of hypertension states he has not been taking his medications for about a month. We will reinitiate those as well. Laboratory studies, EKG, routine. The only abnormalities were sugars are elevated. At this point, however, the patient is suicidal, was evaluated by [facility] and they feel the patient should be taken to an inpatient facility. Current blood sugars are 289. Patient is stable and I feel medically cleared for outpatient treatment as long as he is able to take his medications. I spoke to [facility] to feel the patient should be admitted to a psychiatric facility for further treatment and care.       DISPOSITION:  Patient will be transferred to an in state psychiatric facility in guarded condition.       FINAL IMPRESSION  1. Suicidal ideation    2. Type 2 diabetes mellitus with hyperglycemia, with long-term current use of insulin (HCC)    3. Essential hypertension    4. Non  compliance w medication regimen         Massiel RIVERO (Enrike), am scribing for, and in the presence of, Jaylon Huerta M.D..  Electronically signed by: Massiel Linton (Enrike), 3/12/2017  Jaylon RIVERO M.D. personally performed the services described in this documentation, as scribed by Massiel Linton in my presence, and it is both accurate and complete.      The note accurately reflects work and decisions made by me.  Jaylon Huerta  3/12/2017  12:12 PM

## 2017-03-12 NOTE — ED AVS SNAPSHOT
3/13/2017          Wagner Kenney  [address]  [place] [address]    Dear Wagner:    [facility] wants to ensure your discharge home is safe and you or your loved ones have had all your questions answered regarding your care after you leave the hospital.    You may receive a telephone call within two days of your discharge.  This call is to make certain you understand your discharge instructions as well as ensure we provided you with the best care possible during your stay with us.     The call will only last approximately 3-5 minutes and will be done by a nurse.    Once again, we want to ensure your discharge home is safe and that you have a clear understanding of any next steps in your care.  If you have any questions or concerns, please do not hesitate to contact us, we are here for you.  Thank you for choosing [facility] for your healthcare needs.    Sincerely,    [place] Villatoro    [facility]

## 2017-03-12 NOTE — DISCHARGE PLANNING
Medical Social Work    Referral: Legal Hold    Intervention: Legal Hold Paperwork given to SW by [facility] RN: Qian    Legal Hold Initiated: Date: 03.12.2017   Time: 0330    Legal Hold faxed: Date: 03.12.2017   Time: 1215    Patient’s Insurance Listed on Face Sheet: Medicaid FFS    [facility]    Plan: Patient will transfer to mental health facility once acceptance is obtained.

## 2017-03-12 NOTE — ED NOTES
"Pt to ER per squad. PT reports feeling depressed/SI for x1 month. PT states \"stuff going on with my daughter.\"  Pt plans to d[place] or take pills.   "

## 2017-03-12 NOTE — CONSULTS
"[facility]   TRIAGE ASSESSMENT    Name: Wagner Kenney  MRN: [id number]  : 1965  Age: 51 y.o.  Date of assessment: 3/12/2017  PCP: Eleuteiro Lara M.D.  Persons in attendance: Patient    CHIEF COMPLAINT/PRESENTING ISSUE (as stated by \"Im going to kill myself by a knife or overdose like I did last time. My friend just killed himself.  I have dep but I didn't get my precription filled yet. I have been an alcoholic for 10 yr.s.  ):   Chief Complaint   Patient presents with   • Suicidal Ideation        CURRENT LIVING SITUATION/SOCIAL SUPPORT: lives with daughter in apartment, has 3 children,3 sibling , mother still alive and supportive. Father .     BEHAVIORAL HEALTH TREATMENT HISTORY  Does patient/parent report a history of prior behavioral health treatment for patient?   Yes:    Dates Level of Care Facilty/Provider Diagnosis/Problem Medications   Past s/attempt and unknown where or what med.   He has not filled precr. That was given to him reacently                                                                            SAFETY ASSESSMENT - SELF  Does patient acknowledge current or past symptoms of dangerousness to self? yes  Does parent/significant other report patient has current or past symptoms of dangerousness to self? yes  Does presenting problem suggest symptoms of dangerousness to self? Placed on legal  by Dr. Dunaway for his safety and proteciton, s/i with plan to kill self with knife or OD   one prior attempt  by OD    SAFETY ASSESSMENT - OTHERS  Does patient acknowledge current or past symptoms of aggressive behavior or risk to others? no  Does parent/significant other report patient has current or past symptoms of aggressive behavior or risk to others?  no  Does presenting problem suggest symptoms of dangerousness to others? No    Crisis Safety Plan completed and copy given to patient? On legal  for next 72 hr.       ABUSE/NEGLECT SCREENING  Does patient report feeling “unsafe” " "in his/her home, or afraid of anyone?  yes  Does patient report any history of physical, sexual, or emotional abuse?  no  Does parent or significant other report any of the above? no  Is there evidence of neglect by self?  no  Is there evidence of neglect by a caregiver? no  Does the patient/parent report any history of CPS/APS/police involvement related to suspected abuse/neglect or domestic violence? no  Based on the information provided during the current assessment, is a mandated report of suspected abuse/neglect being made?  No    SUBSTANCE USE SCREENING  Yes:  Wang all substances used in the past 30 days:      Last Use Amount   []   Alcohol     []   Marijuana     []   Heroin     []   Prescription Opioids  (used without prescription, for    recreation, or in excess of prescribed amount)     []   Other Prescription  (used without prescription, for    recreation, or in excess of prescribed amount)     []   Cocaine      []   Methamphetamine     []   \"\" drugs (ectasy, MDMA)     []   Other substances        UDS results: cocaine, marijuana  Breathalyzer results: 0.00      What consequences does the patient associate with any of the above substance use and or addictive behaviors? Other: all aspects of life are being neg. Impacted from his entire life of addiction.       Risk factors for detox (check all that apply):  []  Seizures   []  Diaphoretic (sweating)   []  Tremors   []  Hallucinations   []  Increased blood pressure   []  Decreased blood pressure   []  Other   []  None      [] Patient education on risk factors for detoxification and instructed to return to ER as needed.      UDS results: marijunan  Breathalyzer results: 0.00    Risk factors for detox (check all that apply):  [] Seizures   [] Diaphoretic (sweating)   [] Tremors   [] Hallucinations   [] Increased blood pressure   [] Decreased blood pressure   [] Other    [x] Patient education on risk factors for detoxification and instructed to return to " ER as needed.  MENTAL STATUS   Participation: Attentive  Grooming: Disheveled  Orientation: Alert and Fully Oriented  Behavior: Calm  Eye contact: Poor  Mood: Depressed  Affect: Constricted and Sad  Thought process: Circumstantial  Thought content: Within normal limits  Speech: Rate within normal limits and Volume within normal limits  Perception: Within normal limits  Memory:  No gross evidence of memory deficits  Insight: Poor  Judgment:  Poor  Other:    Collateral information: none    Source:  [] Significant other present in person:   [] Significant other by telephone  [] [facility]   [] [facility] Nursing Staff  [] [facility]  [] Other:     [] Unable to complete full assessment due to:  [] Acute intoxication  [] Patient declined to participate/engage  [] Patient verbally unresponsive  [] Significant cognitive deficits  [] Significant perceptual distortions or behavioral disorganization  [] Other:      CLINICAL IMPRESSIONS:  Primary:  Suicidal ideations with plan  Secondary:   Substance abuse.        IDENTIFIED NEEDS/PLAN:  [Trigger DISPOSITION list for any items marked]    [x]  Imminent safety risk - self s/i with plan  [] Imminent safety risk - others   []  Acute substance withdrawl [x]  Psychosis/Impaired reality testing states addiction last 10 yr.     []  Mood/anxiety []  Substance use/Addictive behavior   []  Maladaptive behaviro []  Parent/child conflict   []  Family/Couples conflict []  Biomedical   []  Housing []  Financial   []   Legal  Occupational/Educational   []  Domestic violence []  Other:     Disposition: placed legal 2000 by Dr. Brandon for his safety and protection, unable to commit to no self harm if he leaves this facility and states will kill self by knife or OD    Does patient express agreement with the above plan? yes    Referral appointment(s) scheduled? N\A    Alert team only: 51 yr old black male with long hx addiction with no tx or sobriety, discussed in detail  tx  available to him in this area.  He is suicidal at this time stating he will kill self if he leaves.   I have discussed findings and recommendations with Dr. Brandon   who is in agreement with these recommendations.     Referral documentation sent to the following facilities:  [facility]  if declined [facility] for evaluation and tx.       Jihan Alicia R.N.  3/12/2017

## 2017-03-12 NOTE — ED NOTES
No distress noted; pt resting on left side on gurney, eyes closed, even respirations noted; pt remains in close observation in direct view of sitter in hallway

## 2017-03-12 NOTE — ED NOTES
RN attempted to communicate EKG, lab results to Dr Huerta at [id number], no answer; RN will follow up again

## 2017-03-12 NOTE — ED NOTES
Pt resting supine on stretcher, eyes closed, even unlabored respirations noted; bed low, call light within reach; pt remains in direct view of sitter in hallway

## 2017-03-12 NOTE — ED NOTES
Patient's home medications have been reviewed by the pharmacy team.     Past Medical History   Diagnosis Date   • HTN (hypertension)    • Chronic LBP    • Chronic shoulder pain    • DM (diabetes mellitus) (CMS-HCC)      TYPE II,diet controlled   • COPD (chronic obstructive pulmonary disease) (CMS-HCC) 3/31/10     mild   • Kidney disease        Patient's Medications   New Prescriptions    No medications on file   Previous Medications    ALBUTEROL (VENTOLIN OR PROVENTIL) 108 (90 BASE) MCG/ACT AERO SOLN INHALATION AEROSOL    Inhale 2 Puffs by mouth every 6 hours as needed for Shortness of Breath.    AMLODIPINE (NORVASC) 10 MG TAB    Take 10 mg by mouth every day.    ATORVASTATIN (LIPITOR) 20 MG TAB    Take 1 Tab by mouth every day.    BUDESONIDE-FORMOTEROL (SYMBICORT) 160-4.5 MCG/ACT AEROSOL    Inhale 2 Puffs by mouth 2 Times a Day.    CYCLOBENZAPRINE (FLEXERIL) 10 MG TAB    Take 10 mg by mouth 2 times a day as needed for Mild Pain.    INSULIN GLARGINE (LANTUS) 100 UNIT/ML SOLUTION    Inject 42 Units as instructed every evening.    LISINOPRIL (PRINIVIL) 10 MG TAB    Take 1 Tab by mouth every day.    NOVOLOG, INSULIN ASPART, (NOVOLOG FLEXPEN) 100 UNIT/ML SOLUTION PEN-INJECTOR INJECTION    Inject 5-15 Units as instructed 3 times a day before meals.    RANITIDINE (ZANTAC) 150 MG TAB    Take 300 mg by mouth 2 times a day as needed for Heartburn.    TIOTROPIUM (SPIRIVA) 18 MCG CAP    Inhale 18 mcg by mouth 1 time daily as needed (Shortness of breath).   Modified Medications    No medications on file   Discontinued Medications    HYDROCODONE-ACETAMINOPHEN (NORCO) 5-325 MG TAB PER TABLET    Take 1-2 Tabs by mouth every four hours as needed.    INSULIN GLARGINE (LANTUS SOLOSTAR) 100 UNIT/ML SOLUTION PEN-INJECTOR INJECTION    Inject 40 Units as instructed every evening.    MULTIVITAMIN (THERAGRAN) TAB    Take 1 Tab by mouth every day.    ONDANSETRON (ZOFRAN ODT) 4 MG TABLET DISPERSIBLE    Take 1 Tab by mouth every 8 hours as  needed for Nausea/Vomiting.    OXYCODONE-ACETAMINOPHEN (PERCOCET) 5-325 MG TAB    Take 1-2 Tabs by mouth every four hours as needed.    RANITIDINE (ZANTAC) 300 MG TABLET    TAKE 1 TABLET BY MOUTH 2 TIMES A DAY.    RANITIDINE  MG CAP    Take 1 Cap by mouth 2 Times a Day.    TIOTROPIUM (SPIRIVA) 18 MCG CAP    Inhale 1 Cap by mouth every day.        A:  Medications do not appear to be contributing to current complaints.       P:    No recommendations at this time. Home medications have been reordered as appropriate.      Triston Eckert, PharmD, BCPS

## 2017-03-12 NOTE — ED NOTES
"Med rec complete per patient, pharmacy correct  Allergies reviewed  Patient last took most of his meds 2 days ago  He states he uses Lantus 42 units nightly and Novolog about  10-12 units TID before meals depending on his blood sugars  Patient states he uses Spiriva \"as needed\", counseled  patient to use Spiriva daily for it to work  "

## 2017-03-12 NOTE — ED NOTES
"Dr Huerta on unit, requesting pharmacy med [employer] tech to clarify home medications and RN to order \"his blood pressure and diabetes medications\"; RN phoned [phone] to request assistance of home medications  "

## 2017-03-12 NOTE — ED AVS SNAPSHOT
Home Care Instructions                                                                                                                Wagner Kenney   MRN: [id number]    Department:  [facility], Emergency Dept   Date of Visit:  3/12/2017            [facility], Emergency Dept    [address] [address]    Phone:  [phone]      You were seen by     1. Jaylon Huerta M.D.    2. Ward Ferrer M.D.    3. Asif Lee M.D.    4. Daiana Lazcano M.D.      Your Diagnosis Was     Suicidal ideation     R45.851       These are the medications you received during your hospitalization from 03/12/2017 0519 to 03/13/2017 1437     Date/Time Order Dose Route Action    03/13/2017 0843 amlodipine (NORVASC) tablet 10 mg 10 mg Oral Given    03/12/2017 0943 amlodipine (NORVASC) tablet 10 mg 10 mg Oral Given    03/12/2017 2220 atorvastatin (LIPITOR) tablet 20 mg 20 mg Oral Given    03/13/2017 0844 budesonide-formoterol (SYMBICORT) 160-4.5 MCG/ACT inhaler 2 Puff 2 Puff Inhalation Given    03/12/2017 2213 budesonide-formoterol (SYMBICORT) 160-4.5 MCG/ACT inhaler 2 Puff 2 Puff Inhalation Given    03/12/2017 0953 budesonide-formoterol (SYMBICORT) 160-4.5 MCG/ACT inhaler 2 Puff 2 Puff Inhalation Given    03/12/2017 2210 insulin glargine (LANTUS) injection 42 Units 42 Units Subcutaneous Given    03/13/2017 1244 insulin lispro (HUMALOG) injection 3-14 Units 12 Units Subcutaneous Given    03/13/2017 0700 insulin lispro (HUMALOG) injection 3-14 Units 3 Units Subcutaneous Given    03/12/2017 2210 insulin lispro (HUMALOG) injection 3-14 Units 12 Units Subcutaneous Given    03/12/2017 1700 insulin lispro (HUMALOG) injection 3-14 Units 4 Units Subcutaneous Given    03/12/2017 1134 insulin lispro (HUMALOG) injection 3-14 Units 7 Units Subcutaneous Given    03/13/2017 0844 lisinopril (PRINIVIL) 10 MG tablet 10 mg 10 mg Oral Given    03/12/2017 0943 lisinopril (PRINIVIL) 10 MG  tablet 10 mg 10 mg Oral Given    03/13/2017 0844 tiotropium (SPIRIVA) 18 MCG inhalation capsule 1 Cap 1 Cap Inhalation Given    03/12/2017 0954 tiotropium (SPIRIVA) 18 MCG inhalation capsule 1 Cap 1 Cap Inhalation Given      Medication Information     Review all of your home medications and newly ordered medications with your primary doctor and/or pharmacist as soon as possible. Follow medication instructions as directed by your doctor and/or pharmacist.     Please keep your complete medication list with you and share with your physician. Update the information when medications are discontinued, doses are changed, or new medications (including over-the-counter products) are added; and carry medication information at all times in the event of emergency situations.               Medication List      ASK your doctor about these medications        Instructions    Morning Afternoon Evening Bedtime    albuterol 108 (90 BASE) MCG/ACT Aers inhalation aerosol        Inhale 2 Puffs by mouth every 6 hours as needed for Shortness of Breath.   Dose:  2 Puff                        amlodipine 10 MG Tabs   Last time this was given:  10 mg on 3/13/2017  8:43 AM   Commonly known as:  NORVASC        Take 10 mg by mouth every day.   Dose:  10 mg                        atorvastatin 20 MG Tabs   Last time this was given:  20 mg on 3/12/2017 10:20 PM   Commonly known as:  LIPITOR        Take 1 Tab by mouth every day.   Dose:  20 mg                        budesonide-formoterol 160-4.5 MCG/ACT Aero   Last time this was given:  2 Puffs on 3/13/2017  8:44 AM   Commonly known as:  SYMBICORT        Inhale 2 Puffs by mouth 2 Times a Day.   Dose:  2 Puff                        cyclobenzaprine 10 MG Tabs   Commonly known as:  FLEXERIL        Take 10 mg by mouth 2 times a day as needed for Mild Pain.   Dose:  10 mg                        insulin glargine 100 UNIT/ML Soln   Last time this was given:  42 Units on 3/12/2017 10:10 PM   Commonly known  as:  LANTUS        Inject 42 Units as instructed every evening.   Dose:  42 Units                        lisinopril 10 MG Tabs   Last time this was given:  10 mg on 3/13/2017  8:44 AM   Commonly known as:  PRINIVIL        Take 1 Tab by mouth every day.   Dose:  10 mg                        NOVOLOG (insulin aspart) 100 UNIT/ML Sopn injection   Commonly known as:  NOVOLOG FLEXPEN        Inject 5-15 Units as instructed 3 times a day before meals.   Dose:  5-15 Units                        ranitidine 150 MG Tabs   Commonly known as:  ZANTAC        Take 300 mg by mouth 2 times a day as needed for Heartburn.   Dose:  300 mg                        tiotropium 18 MCG Caps   Last time this was given:  1 Cap on 3/13/2017  8:44 AM   Commonly known as:  SPIRIVA        Inhale 18 mcg by mouth 1 time daily as needed (Shortness of breath).   Dose:  18 mcg                                Procedures and tests performed during your visit     Procedure/Test Number of Times Performed    ACCU-CHEK GLUCOSE 5    APTT 1    Accu-Chek ACHS 2    Action is required: Protocol 1073 Hypoglycemia has been implemented 1    BTYPE NATRIURETIC PEPTIDE 1    CBC WITH DIFFERENTIAL 1    COMP METABOLIC PANEL 1    DX-CHEST-PORTABLE (1 VIEW) 1    ED CONSULT TO BEHAVIORAL HEALTH 1    EKG (ER) 1    ESTIMATED GFR 1    If insulin ordered, initiate hypoglycemia protocol 1    LIPASE 1    POC BREATHALIZER 1    PROTHROMBIN TIME 1    Protocol 1073 Inclusion Criteria 1    Protocol 1073 Initiate protocol immediately if FSBG is less than or equal to 70 mg/dL 1    Protocol 1073 NOTIFY 1    TROPONIN 1    URINE DRUG SCREEN 1            Patient Information     Patient Information    Following emergency treatment: all patient requiring follow-up care must return either to a private physician or a clinic if your condition worsens before you are able to obtain further medical attention, please return to the emergency room.     Billing Information    At [employer], we work to  make the billing process streamlined for our patients.  Our Representatives are here to answer any questions you may have regarding your hospital bill.  If you have insurance coverage and have supplied your insurance information to us, we will submit a claim to your insurer on your behalf.  Should you have any questions regarding your bill, we can be reached online or by phone as follows:  Online: You are able pay your bills online or live chat with our representatives about any billing questions you may have. We are here to help Monday - Friday from 8:00am to 7:30pm and 9:00am - 12:00pm on Saturdays.  Please visit [email or web address]  for more information.   Phone:  [phone] or [phone]    Please note that your emergency physician, surgeon, pathologist, radiologist, anesthesiologist, and other specialists are not employed by [facility] and will therefore bill separately for their services.  Please contact them directly for any questions concerning their bills at the numbers below:     Emergency Physician Services:  [phone]  [facility]:  [phone]  [facility]:  [phone]  [facility]:  [phone]    1. Your final bill may vary from the amount quoted upon discharge if all procedures are not complete at that time, or if your doctor has additional procedures of which we are not aware. You will receive an additional bill if you return to the Emergency Department at [facility] for suture removal regardless of the facility of which the sutures were placed.     2. Please arrange for settlement of this account at the emergency registration.    3. All self-pay accounts are due in full at the time of treatment.  If you are unable to meet this obligation then payment is expected within 4-5 days.     4. If you have had radiology studies (CT, X-ray, Ultrasound, MRI), you have received a preliminary result during your  emergency department visit. Please contact the radiology department [phone] to receive a copy of your final result. Please discuss the Final result with your primary physician or with the follow up physician provided.     Crisis Hotline:  [place] Crisis Hotline:  9-812-RSBHRQI or 1-996.146.4723  [place] Crisis Hotline:    1-296.620.2515 or 870-760-3411         ED Discharge Follow Up Questions    1. In order to provide you with very good care, we would like to follow up with a phone call in the next few days.  May we have your permission to contact you?     YES /  NO    2. What is the best phone number to call you? (       )_____-__________    3. What is the best time to call you?      Morning  /  Afternoon  /  Evening                   Patient Signature:  ____________________________________________________________    Date:  ____________________________________________________________      Your appointments     Apr 24, 2017  1:30 PM   New Patient with Won Atwood M.D.   [facility] (--)    [address] [id number] [phone]           Please bring Photo ID, Insurance Cards, All Medication Bottles and copies of any legal documents (such as Living Will, Power of ) If speaking a language besides English please bring an adult . Please arrive 30 minutes prior for check in and registration. You will be receiving a confirmation call a few days before your appointment from our automated call confirmation system.

## 2017-03-12 NOTE — ED AVS SNAPSHOT
[employer] Access Code: Activation code not generated  Current [employer] Status: Patient Declined    [employer]  A secure, online tool to manage your health information     [employer]® is a secure, online tool that connects you to your personalized health information from the privacy of your home -- day or night - making it very easy for you to manage your healthcare. Once the activation process is completed, you can even access your medical information using the [employer] urvashi, which is available for free in the Apple Urvashi store or Google Play store.     [employer] provides the following levels of access (as shown below):   My Chart Features   [facility] Primary Care Doctor [facility]  Specialists [facility]  Urgent  Care Non-[facility]  Primary Care  Doctor   Email your healthcare team securely and privately 24/7 X X X X   Manage appointments: schedule your next appointment; view details of past/upcoming appointments X      Request prescription refills. X      View recent personal medical records, including lab and immunizations X X X X   View health record, including health history, allergies, medications X X X X   Read reports about your outpatient visits, procedures, consult and ER notes X X X X   See your discharge summary, which is a recap of your hospital and/or ER visit that includes your diagnosis, lab results, and care plan. X X       How to register for [employer]:  1. Go to  [email or web address].  2. Click on the Sign Up Now box, which takes you to the New Member Sign Up page. You will need to provide the following information:  a. Enter your [employer] Access Code exactly as it appears at the top of this page. (You will not need to use this code after you’ve completed the sign-up process. If you do not sign up before the expiration date, you must request a new code.)   b. Enter your date of birth.   c. Enter your home email address.   d. Click Submit, and follow the next screen’s instructions.  3. Create a [employer] ID.  This will be your [employer] login ID and cannot be changed, so think of one that is secure and easy to remember.  4. Create a [employer] password. You can change your password at any time.  5. Enter your Password Reset Question and Answer. This can be used at a later time if you forget your password.   6. Enter your e-mail address. This allows you to receive e-mail notifications when new information is available in [employer].  7. Click Sign Up. You can now view your health information.    For assistance activating your [employer] account, call [phone]

## 2017-03-13 VITALS
DIASTOLIC BLOOD PRESSURE: 84 MMHG | RESPIRATION RATE: 18 BRPM | BODY MASS INDEX: 26.79 KG/M2 | TEMPERATURE: 98.4 F | OXYGEN SATURATION: 98 % | HEART RATE: 98 BPM | WEIGHT: 220 LBS | HEIGHT: 76 IN | SYSTOLIC BLOOD PRESSURE: 121 MMHG

## 2017-03-13 LAB
EKG IMPRESSION: NORMAL
GLUCOSE BLD-MCNC: 161 MG/DL (ref 65–99)
GLUCOSE BLD-MCNC: 353 MG/DL (ref 65–99)

## 2017-03-13 PROCEDURE — 700102 HCHG RX REV CODE 250 W/ 637 OVERRIDE(OP): Performed by: EMERGENCY MEDICINE

## 2017-03-13 PROCEDURE — A9270 NON-COVERED ITEM OR SERVICE: HCPCS | Performed by: EMERGENCY MEDICINE

## 2017-03-13 PROCEDURE — 96372 THER/PROPH/DIAG INJ SC/IM: CPT

## 2017-03-13 PROCEDURE — 82962 GLUCOSE BLOOD TEST: CPT

## 2017-03-13 RX ADMIN — TIOTROPIUM BROMIDE 1 CAPSULE: 18 CAPSULE ORAL; RESPIRATORY (INHALATION) at 08:44

## 2017-03-13 RX ADMIN — LISINOPRIL 10 MG: 10 TABLET ORAL at 08:44

## 2017-03-13 RX ADMIN — INSULIN LISPRO 3 UNITS: 100 INJECTION, SOLUTION INTRAVENOUS; SUBCUTANEOUS at 07:00

## 2017-03-13 RX ADMIN — AMLODIPINE BESYLATE 10 MG: 5 TABLET ORAL at 08:43

## 2017-03-13 RX ADMIN — BUDESONIDE AND FORMOTEROL FUMARATE DIHYDRATE 2 PUFF: 160; 4.5 AEROSOL RESPIRATORY (INHALATION) at 08:44

## 2017-03-13 RX ADMIN — INSULIN LISPRO 12 UNITS: 100 INJECTION, SOLUTION INTRAVENOUS; SUBCUTANEOUS at 12:44

## 2017-03-13 NOTE — ED NOTES
Vital signs re checked.  LYLA at bedside for transfer to [facility]  Report given to LYLA.  Pt ambulates out of ER with LYLA

## 2017-03-13 NOTE — DISCHARGE PLANNING
Medical Social Work    Referral: Legal hold Transfer to Mental Health Facility    Intervention: SOTO received call from Kisha at [facility] stating that Dr. Gusman has accepted the patient for admission.     SOTO arranged for transportation to be set up through [facility]    Pt has transport benefit through [facility].     The pt will be picked up at 1500.     SOTO notified the RN of the departure time as well as accepting facility.     SOTO created transfer packet and placed on chart. Original Legal Hold placed in packet.     Plan: Pt will transfer to [facility] at 1500.

## 2017-03-13 NOTE — ED NOTES
Pt medicated per MAR.  Awaiting lunch.    Updated on plan of care- will be transferring to [facility] at 1500

## 2017-03-13 NOTE — ED NOTES
Report from SORAYA cooper    Pt laying on gurney, wakes up when Rn walks into room.  No needs at this time.  No sitter available. Charge Rn aware.

## 2017-03-13 NOTE — ED NOTES
Report received from RN. Assumed care of pt. Pt resting comfortably on gurney. Sitter outside room, close observations in place.

## 2017-03-13 NOTE — ED NOTES
RN obtained accu check; pt was provided box lunch per his request; SQ insulin was administered according to MAR/scale; pt denies any further needs at this time

## 2017-03-13 NOTE — ED NOTES
Pt ate 100% dinner tray; pt is resting comfortable, behavior is appropriate and denies needs at this time; pt remains on close observation in direct view of sitter in hallway

## 2017-04-03 DIAGNOSIS — E78.5 HYPERLIPIDEMIA WITH TARGET LDL LESS THAN 100: ICD-10-CM

## 2017-04-03 RX ORDER — ATORVASTATIN CALCIUM 20 MG/1
20 TABLET, FILM COATED ORAL DAILY
Qty: 30 TAB | Refills: 3 | Status: SHIPPED | OUTPATIENT
Start: 2017-04-03 | End: 2017-04-05 | Stop reason: SDUPTHER

## 2017-04-03 RX ORDER — BUDESONIDE AND FORMOTEROL FUMARATE DIHYDRATE 160; 4.5 UG/1; UG/1
AEROSOL RESPIRATORY (INHALATION)
Qty: 10.2 INHALER | Refills: 6 | Status: SHIPPED | OUTPATIENT
Start: 2017-04-03 | End: 2017-04-05 | Stop reason: SDUPTHER

## 2017-04-03 RX ORDER — TIOTROPIUM BROMIDE 18 UG/1
18 CAPSULE ORAL; RESPIRATORY (INHALATION)
Qty: 90 CAP | Refills: 0 | Status: SHIPPED | OUTPATIENT
Start: 2017-04-03 | End: 2017-07-19 | Stop reason: SDUPTHER

## 2017-04-05 ENCOUNTER — OFFICE VISIT (OUTPATIENT)
Dept: MEDICAL GROUP | Facility: MEDICAL CENTER | Age: 52
End: 2017-04-05
Attending: FAMILY MEDICINE
Payer: MEDICAID

## 2017-04-05 VITALS
OXYGEN SATURATION: 97 % | WEIGHT: 224 LBS | HEIGHT: 77 IN | SYSTOLIC BLOOD PRESSURE: 120 MMHG | RESPIRATION RATE: 16 BRPM | DIASTOLIC BLOOD PRESSURE: 78 MMHG | BODY MASS INDEX: 26.45 KG/M2 | HEART RATE: 88 BPM | TEMPERATURE: 98.2 F

## 2017-04-05 DIAGNOSIS — L84 CORNS/CALLOSITIES: ICD-10-CM

## 2017-04-05 DIAGNOSIS — E34.9 NEUROPATHY ASSOCIATED WITH ENDOCRINE DISORDER (HCC): ICD-10-CM

## 2017-04-05 DIAGNOSIS — M54.9 CHRONIC NECK AND BACK PAIN: ICD-10-CM

## 2017-04-05 DIAGNOSIS — E78.5 HYPERLIPIDEMIA WITH TARGET LDL LESS THAN 100: ICD-10-CM

## 2017-04-05 DIAGNOSIS — M54.2 CHRONIC NECK AND BACK PAIN: ICD-10-CM

## 2017-04-05 DIAGNOSIS — G89.29 CHRONIC NECK AND BACK PAIN: ICD-10-CM

## 2017-04-05 DIAGNOSIS — I10 ESSENTIAL HYPERTENSION: ICD-10-CM

## 2017-04-05 DIAGNOSIS — J43.9 PULMONARY EMPHYSEMA, UNSPECIFIED EMPHYSEMA TYPE (HCC): ICD-10-CM

## 2017-04-05 DIAGNOSIS — N18.30 CHRONIC KIDNEY DISEASE (CKD), STAGE III (MODERATE) (HCC): ICD-10-CM

## 2017-04-05 DIAGNOSIS — G63 NEUROPATHY ASSOCIATED WITH ENDOCRINE DISORDER (HCC): ICD-10-CM

## 2017-04-05 DIAGNOSIS — E11.49 TYPE 2 DIABETES MELLITUS WITH OTHER DIABETIC NEUROLOGICAL COMPLICATION (HCC): ICD-10-CM

## 2017-04-05 PROCEDURE — 99214 OFFICE O/P EST MOD 30 MIN: CPT | Performed by: FAMILY MEDICINE

## 2017-04-05 RX ORDER — BUDESONIDE AND FORMOTEROL FUMARATE DIHYDRATE 160; 4.5 UG/1; UG/1
AEROSOL RESPIRATORY (INHALATION)
Qty: 1 INHALER | Refills: 11 | Status: SHIPPED | OUTPATIENT
Start: 2017-04-05 | End: 2017-07-20 | Stop reason: SDUPTHER

## 2017-04-05 RX ORDER — ALBUTEROL SULFATE 90 UG/1
2 AEROSOL, METERED RESPIRATORY (INHALATION) EVERY 6 HOURS PRN
Qty: 8.5 G | Refills: 6 | Status: SHIPPED | OUTPATIENT
Start: 2017-04-05 | End: 2017-11-14 | Stop reason: SDUPTHER

## 2017-04-05 RX ORDER — RANITIDINE 150 MG/1
150 TABLET ORAL 2 TIMES DAILY PRN
Qty: 60 TAB | Refills: 3 | Status: SHIPPED | OUTPATIENT
Start: 2017-04-05 | End: 2017-08-24 | Stop reason: SDUPTHER

## 2017-04-05 RX ORDER — INSULIN GLARGINE 100 [IU]/ML
42 INJECTION, SOLUTION SUBCUTANEOUS DAILY
Qty: 10 ML | Refills: 6 | Status: SHIPPED | OUTPATIENT
Start: 2017-04-05 | End: 2017-05-11 | Stop reason: SDUPTHER

## 2017-04-05 RX ORDER — LISINOPRIL 10 MG/1
10 TABLET ORAL DAILY
Qty: 30 TAB | Refills: 3 | Status: SHIPPED | OUTPATIENT
Start: 2017-04-05 | End: 2017-05-11

## 2017-04-05 RX ORDER — TIOTROPIUM BROMIDE 18 UG/1
18 CAPSULE ORAL; RESPIRATORY (INHALATION)
Qty: 90 CAP | Refills: 0 | Status: SHIPPED | OUTPATIENT
Start: 2017-04-05 | End: 2017-07-19

## 2017-04-05 RX ORDER — ATORVASTATIN CALCIUM 20 MG/1
20 TABLET, FILM COATED ORAL DAILY
Qty: 30 TAB | Refills: 3 | Status: SHIPPED | OUTPATIENT
Start: 2017-04-05 | End: 2017-08-24 | Stop reason: SDUPTHER

## 2017-04-05 RX ORDER — CYCLOBENZAPRINE HCL 10 MG
10 TABLET ORAL 2 TIMES DAILY PRN
Qty: 30 TAB | Refills: 3 | Status: SHIPPED | OUTPATIENT
Start: 2017-04-05 | End: 2017-04-24

## 2017-04-05 RX ORDER — AMLODIPINE BESYLATE 10 MG/1
10 TABLET ORAL DAILY
Qty: 90 TAB | Refills: 1 | Status: SHIPPED | OUTPATIENT
Start: 2017-04-05 | End: 2017-08-24 | Stop reason: SDUPTHER

## 2017-04-05 ASSESSMENT — ENCOUNTER SYMPTOMS
HEADACHES: 0
COUGH: 0
FEVER: 0
BACK PAIN: 1
SENSORY CHANGE: 1
EYES NEGATIVE: 1
SHORTNESS OF BREATH: 0
ABDOMINAL PAIN: 0
NAUSEA: 0
CHILLS: 0
TINGLING: 1
VOMITING: 0
FOCAL WEAKNESS: 0
TREMORS: 0
SPEECH CHANGE: 0
PALPITATIONS: 0

## 2017-04-05 ASSESSMENT — PATIENT HEALTH QUESTIONNAIRE - PHQ9: CLINICAL INTERPRETATION OF PHQ2 SCORE: 0

## 2017-04-05 NOTE — PROGRESS NOTES
Subjective:      Wagner Kenney is a 52 y.o. male who presents with No chief complaint on file.            HPI Comments: Patient here to reestablish with the clinic, he has a history of diabetes insulin-dependent, chronic kidney disease stage III, neuropathy, hyperlipidemia, hypertension, chronic pain (back and neck) and calluses on both feet near ulcerating.    Patient has bilateral calluses on the plantar surface of both feet. The calluses are thickened but appear to be near ulcerating in the central area of callus. He has decreased sensation of both feet to light touch. Also his neuropathy in both feet are worsening will have patient continue to use his Neurontin as directed until he is able to be further assessed by pain management for worsening neuropathy.  He also has a history of chronic neck and back pain for which she is also using the Neurontin. Once again will refer to pain management for assistance in management of his chronic pain as well as further assessment. We'll continue to follow.    He will continue to use his insulin Lantus 42 units daily as well as his NovoLog 3 times daily before meals sliding scale. Will reorder an A1c and microalbumin creatinine ratio along with a metabolic panel to reassess his diabetes. Also have patient continue to check his feet daily for any further skin breakdown or changes. He has been referred to wound care for assistance in management of his bilateral calluses but diabetic shoes were also ordered for patient today. We'll also have him schedule an appointment with his ophthalmologist for a diabetic retinal exam.    His blood pressure appears to be well managed with the current blood pressure medications he is on. We'll have him continue to check his blood pressures at home and record his values. He is not having any chest pain or shortness of breath at this point. We'll continue to follow.    We'll also recheck his cholesterol level continues to take his cholesterol  "medication as directed. He is not having any darkening of his urine or muscle aches or pains. Discussed diet and exercise to help maintain and lower his cholesterol. We'll continue to follow.    We'll have him continue to use his inhalers as directed. He has not had any recent COPD exacerbations or infections. He has been using his medications without any problems. We'll continue to follow also discussed ordering a PFT for further evaluation.    He has a history of chronic kidney disease, we'll recheck his metabolic panel as well as a microalbumin creatinine ratio. He had seen nephrology in the past, so we'll have him continue to follow-up as he had been doing. We'll continue to follow.     Current medications, allergies, and problem list reviewed with patient and updated in EPIC.        Review of Systems   Constitutional: Negative for fever and chills.   HENT: Negative for hearing loss.    Eyes: Negative.    Respiratory: Negative for cough and shortness of breath.    Cardiovascular: Negative for chest pain and palpitations.   Gastrointestinal: Negative for nausea, vomiting and abdominal pain.   Musculoskeletal: Positive for back pain and joint pain.   Neurological: Positive for tingling and sensory change. Negative for tremors, speech change, focal weakness and headaches.          Objective:     Filed Vitals:    04/05/17 1443   BP: 120/78   Pulse: 88   Temp: 36.8 °C (98.2 °F)   Resp: 6   Height: 1.956 m (6' 5.01\")   Weight: 101.606 kg (224 lb)   SpO2: 97%         Physical Exam   HENT:   Right Ear: External ear normal.   Left Ear: External ear normal.   Nose: Nose normal.   Mouth/Throat: Oropharynx is clear and moist.   Eyes: EOM are normal. Pupils are equal, round, and reactive to light.   Neck: Normal range of motion.   Cardiovascular: Normal rate, regular rhythm and normal heart sounds.  Exam reveals no friction rub.    No murmur heard.  Pulmonary/Chest: Effort normal and breath sounds normal. No respiratory " distress. He has no wheezes. He has no rales.   Abdominal: Soft. Bowel sounds are normal. He exhibits no distension. There is no tenderness.   Musculoskeletal:   callouses on B plantar surfaces of feet, decreased ROM of affected extremities and back in flexion   Neurological:   Decreased sensation of B feet                 Assessment/Plan:     1. Corns/callosities  Pt referred to wound care for a further management of his near ulcerating calluses, diabetic shoes also order for pt to use as directed.  - REFERRAL TO WOUND CLINIC  - REFERRAL TO PAIN CLINIC  - Misc. Devices Misc; Diabetic shoes to use as directed for B near ulcerating callouses on plantar surfaces of feet  Dispense: 2 Device; Refill: 0    2. Uncontrolled type 1 diabetes mellitus with stage 3 chronic kidney disease (CMS-HCC)  We will have patient continues to take his medication as directed. Will reorder hemoglobin A1c, microalbumin creatinine ratio, metabolic panel and have him check his blood sugars on a daily basis a glucometer with lancets and test strips will also be reordered. We'll also continue to check his feet daily for further skin breakdown. Also recommended following up with his ophthalmologist for a retinal exam.  - REFERRAL TO WOUND CLINIC  - COMP METABOLIC PANEL; Future  - LIPID PROFILE; Future  - CBC WITH DIFFERENTIAL; Future  - HEMOGLOBIN A1C; Future  - MICROALBUMIN CREAT RATIO URINE; Future  - REFERRAL TO PAIN CLINIC  - Misc. Devices Misc; Diabetic shoes to use as directed for B near ulcerating callouses on plantar surfaces of feet  Dispense: 2 Device; Refill: 0    3. Essential hypertension  Will have him continue to use his medications as directed. He has been advised to monitor blood pressure at home and keep notes. If blood pressure elevated or having symptoms of CP, SOB or neurologic changes to go to the er.    - COMP METABOLIC PANEL; Future  - CBC WITH DIFFERENTIAL; Future  - lisinopril (PRINIVIL) 10 MG Tab; Take 1 Tab by mouth  every day.  Dispense: 30 Tab; Refill: 3    4. Chronic kidney disease (CKD), stage III (moderate)  We'll recheck a metabolic panel as well as a microalbumin creatinine ratio. We'll have him continue to take his lisinopril last directed. We'll continue to follow.  - COMP METABOLIC PANEL; Future  - MICROALBUMIN CREAT RATIO URINE; Future  - lisinopril (PRINIVIL) 10 MG Tab; Take 1 Tab by mouth every day.  Dispense: 30 Tab; Refill: 3    5. Hyperlipidemia with target LDL less than 100  Continues to take his Lipitor as directed. He has been advised to increase the fibers in his diet, avoid fatty/fried foods. Also advised to exercise as tolerated.   - COMP METABOLIC PANEL; Future  - LIPID PROFILE; Future  - atorvastatin (LIPITOR) 20 MG Tab; Take 1 Tab by mouth every day.  Dispense: 30 Tab; Refill: 3    6. Neuropathy associated with endocrine disorder (CMS-HCC)  We'll have him continue to take his pain medication as directed. Referral to pain management has also been made today. A diabetic foot exam was also done.  - REFERRAL TO WOUND CLINIC  - REFERRAL TO PAIN CLINIC  - Diabetic foot Exam  - Misc. Devices Misc; Diabetic shoes to use as directed for B near ulcerating callouses on plantar surfaces of feet  Dispense: 2 Device; Refill: 0    7. Type 2 diabetes mellitus with other diabetic neurological complication (CMS-HCC)  See above plan.  - NOVOLOG, insulin aspart, (NOVOLOG FLEXPEN) 100 UNIT/ML Solution Pen-injector injection; Inject 5-15 Units as instructed 3 times a day before meals.  Dispense: 5 PEN; Refill: 11  - Diabetic foot Exam    8. Pulmonary emphysema, unspecified emphysema type (CMS-HCC)  Will have him continue to use his inhalers as directed. Will continue to follow.  - albuterol 108 (90 BASE) MCG/ACT Aero Soln inhalation aerosol; Inhale 2 Puffs by mouth every 6 hours as needed for Shortness of Breath.  Dispense: 8.5 g; Refill: 6    9. Chronic neck and back pain  Will have him continue to take his medications as  directed. A referral made back to pain management for assistance in management of his chronic pain issues.

## 2017-04-13 ENCOUNTER — HOSPITAL ENCOUNTER (OUTPATIENT)
Dept: LAB | Facility: MEDICAL CENTER | Age: 52
End: 2017-04-13
Attending: FAMILY MEDICINE
Payer: MEDICAID

## 2017-04-13 DIAGNOSIS — N18.30 CHRONIC KIDNEY DISEASE (CKD), STAGE III (MODERATE) (HCC): ICD-10-CM

## 2017-04-13 DIAGNOSIS — I10 ESSENTIAL HYPERTENSION: ICD-10-CM

## 2017-04-13 DIAGNOSIS — E78.5 HYPERLIPIDEMIA WITH TARGET LDL LESS THAN 100: ICD-10-CM

## 2017-04-13 LAB
ALBUMIN SERPL BCP-MCNC: 4 G/DL (ref 3.2–4.9)
ALBUMIN/GLOB SERPL: 1.4 G/DL
ALP SERPL-CCNC: 80 U/L (ref 30–99)
ALT SERPL-CCNC: 27 U/L (ref 2–50)
ANION GAP SERPL CALC-SCNC: 10 MMOL/L (ref 0–11.9)
AST SERPL-CCNC: 21 U/L (ref 12–45)
BASOPHILS # BLD AUTO: 0.4 % (ref 0–1.8)
BASOPHILS # BLD: 0.03 K/UL (ref 0–0.12)
BILIRUB SERPL-MCNC: 0.4 MG/DL (ref 0.1–1.5)
BUN SERPL-MCNC: 18 MG/DL (ref 8–22)
CALCIUM SERPL-MCNC: 9.2 MG/DL (ref 8.5–10.5)
CHLORIDE SERPL-SCNC: 102 MMOL/L (ref 96–112)
CHOLEST SERPL-MCNC: 198 MG/DL (ref 100–199)
CO2 SERPL-SCNC: 27 MMOL/L (ref 20–33)
CREAT SERPL-MCNC: 1.21 MG/DL (ref 0.5–1.4)
CREAT UR-MCNC: 267.8 MG/DL
EOSINOPHIL # BLD AUTO: 0.33 K/UL (ref 0–0.51)
EOSINOPHIL NFR BLD: 4.6 % (ref 0–6.9)
ERYTHROCYTE [DISTWIDTH] IN BLOOD BY AUTOMATED COUNT: 43.2 FL (ref 35.9–50)
EST. AVERAGE GLUCOSE BLD GHB EST-MCNC: 283 MG/DL
GFR SERPL CREATININE-BSD FRML MDRD: >60 ML/MIN/1.73 M 2
GLOBULIN SER CALC-MCNC: 2.9 G/DL (ref 1.9–3.5)
GLUCOSE SERPL-MCNC: 243 MG/DL (ref 65–99)
HBA1C MFR BLD: 11.5 % (ref 0–5.6)
HCT VFR BLD AUTO: 52.2 % (ref 42–52)
HDLC SERPL-MCNC: 56 MG/DL
HGB BLD-MCNC: 16.7 G/DL (ref 14–18)
IMM GRANULOCYTES # BLD AUTO: 0.03 K/UL (ref 0–0.11)
IMM GRANULOCYTES NFR BLD AUTO: 0.4 % (ref 0–0.9)
LDLC SERPL CALC-MCNC: 112 MG/DL
LYMPHOCYTES # BLD AUTO: 2.36 K/UL (ref 1–4.8)
LYMPHOCYTES NFR BLD: 32.6 % (ref 22–41)
MCH RBC QN AUTO: 28.2 PG (ref 27–33)
MCHC RBC AUTO-ENTMCNC: 32 G/DL (ref 33.7–35.3)
MCV RBC AUTO: 88.2 FL (ref 81.4–97.8)
MICROALBUMIN UR-MCNC: 120.8 MG/DL
MICROALBUMIN/CREAT UR: 451 MG/G (ref 0–30)
MONOCYTES # BLD AUTO: 0.56 K/UL (ref 0–0.85)
MONOCYTES NFR BLD AUTO: 7.7 % (ref 0–13.4)
NEUTROPHILS # BLD AUTO: 3.92 K/UL (ref 1.82–7.42)
NEUTROPHILS NFR BLD: 54.3 % (ref 44–72)
NRBC # BLD AUTO: 0 K/UL
NRBC BLD AUTO-RTO: 0 /100 WBC
PLATELET # BLD AUTO: 254 K/UL (ref 164–446)
PMV BLD AUTO: 10.3 FL (ref 9–12.9)
POTASSIUM SERPL-SCNC: 4.2 MMOL/L (ref 3.6–5.5)
PROT SERPL-MCNC: 6.9 G/DL (ref 6–8.2)
RBC # BLD AUTO: 5.92 M/UL (ref 4.7–6.1)
SODIUM SERPL-SCNC: 139 MMOL/L (ref 135–145)
TRIGL SERPL-MCNC: 151 MG/DL (ref 0–149)
WBC # BLD AUTO: 7.2 K/UL (ref 4.8–10.8)

## 2017-04-13 PROCEDURE — 80053 COMPREHEN METABOLIC PANEL: CPT

## 2017-04-13 PROCEDURE — 82043 UR ALBUMIN QUANTITATIVE: CPT

## 2017-04-13 PROCEDURE — 36415 COLL VENOUS BLD VENIPUNCTURE: CPT

## 2017-04-13 PROCEDURE — 80061 LIPID PANEL: CPT

## 2017-04-13 PROCEDURE — 83036 HEMOGLOBIN GLYCOSYLATED A1C: CPT

## 2017-04-13 PROCEDURE — 85025 COMPLETE CBC W/AUTO DIFF WBC: CPT

## 2017-04-13 PROCEDURE — 82570 ASSAY OF URINE CREATININE: CPT

## 2017-04-24 ENCOUNTER — OFFICE VISIT (OUTPATIENT)
Dept: PHYSICAL MEDICINE AND REHAB | Facility: MEDICAL CENTER | Age: 52
End: 2017-04-24
Payer: MEDICAID

## 2017-04-24 VITALS
TEMPERATURE: 97.5 F | SYSTOLIC BLOOD PRESSURE: 120 MMHG | DIASTOLIC BLOOD PRESSURE: 78 MMHG | HEART RATE: 92 BPM | OXYGEN SATURATION: 98 % | WEIGHT: 235 LBS | BODY MASS INDEX: 28.62 KG/M2 | HEIGHT: 76 IN

## 2017-04-24 DIAGNOSIS — G56.02 CARPAL TUNNEL SYNDROME OF LEFT WRIST: ICD-10-CM

## 2017-04-24 DIAGNOSIS — M75.42 IMPINGEMENT SYNDROME OF LEFT SHOULDER: ICD-10-CM

## 2017-04-24 DIAGNOSIS — M54.2 NECK PAIN: ICD-10-CM

## 2017-04-24 DIAGNOSIS — G89.29 CHRONIC BILATERAL LOW BACK PAIN WITHOUT SCIATICA: ICD-10-CM

## 2017-04-24 DIAGNOSIS — M54.50 CHRONIC BILATERAL LOW BACK PAIN WITHOUT SCIATICA: ICD-10-CM

## 2017-04-24 DIAGNOSIS — M79.2 NERVE PAIN: ICD-10-CM

## 2017-04-24 DIAGNOSIS — M47.812 SPONDYLOSIS OF CERVICAL REGION WITHOUT MYELOPATHY OR RADICULOPATHY: ICD-10-CM

## 2017-04-24 DIAGNOSIS — M79.18 MYOFASCIAL PAIN: ICD-10-CM

## 2017-04-24 DIAGNOSIS — M50.30 DDD (DEGENERATIVE DISC DISEASE), CERVICAL: ICD-10-CM

## 2017-04-24 DIAGNOSIS — E11.42 DIABETIC PERIPHERAL NEUROPATHY (HCC): ICD-10-CM

## 2017-04-24 DIAGNOSIS — M51.36 DDD (DEGENERATIVE DISC DISEASE), LUMBAR: ICD-10-CM

## 2017-04-24 DIAGNOSIS — M62.838 MUSCLE SPASM: ICD-10-CM

## 2017-04-24 PROCEDURE — 99214 OFFICE O/P EST MOD 30 MIN: CPT | Performed by: PHYSICAL MEDICINE & REHABILITATION

## 2017-04-24 RX ORDER — GABAPENTIN 100 MG/1
CAPSULE ORAL
Qty: 45 CAP | Refills: 2 | Status: SHIPPED | OUTPATIENT
Start: 2017-04-24 | End: 2017-07-27 | Stop reason: SDUPTHER

## 2017-04-24 RX ORDER — BLOOD SUGAR DIAGNOSTIC
STRIP MISCELLANEOUS
COMMUNITY
Start: 2017-04-05 | End: 2017-06-12 | Stop reason: SDUPTHER

## 2017-04-24 RX ORDER — RANITIDINE 300 MG/1
TABLET ORAL
COMMUNITY
Start: 2017-04-01 | End: 2017-08-24

## 2017-04-24 RX ORDER — LANCETS
EACH MISCELLANEOUS
COMMUNITY
Start: 2017-04-05 | End: 2017-06-12 | Stop reason: SDUPTHER

## 2017-04-24 RX ORDER — ATORVASTATIN CALCIUM 10 MG/1
TABLET, FILM COATED ORAL
COMMUNITY
Start: 2017-02-22 | End: 2017-06-12 | Stop reason: SDUPTHER

## 2017-04-24 RX ORDER — BACLOFEN 10 MG/1
TABLET ORAL
Qty: 45 TAB | Refills: 2 | Status: SHIPPED | OUTPATIENT
Start: 2017-04-24 | End: 2017-07-27

## 2017-04-24 RX ORDER — INSULIN GLARGINE 100 [IU]/ML
INJECTION, SOLUTION SUBCUTANEOUS
Refills: 11 | COMMUNITY
Start: 2017-01-27 | End: 2017-05-11 | Stop reason: SDUPTHER

## 2017-04-24 ASSESSMENT — ENCOUNTER SYMPTOMS
BACK PAIN: 1
MYALGIAS: 1
DIARRHEA: 0
CHILLS: 0
PHOTOPHOBIA: 0
FEVER: 0
PALPITATIONS: 0
TINGLING: 1
HEMOPTYSIS: 0
SPUTUM PRODUCTION: 0
DOUBLE VISION: 0
ORTHOPNEA: 0
ABDOMINAL PAIN: 0
NECK PAIN: 1
SENSORY CHANGE: 1

## 2017-04-24 NOTE — PROGRESS NOTES
Subjective:      Wagner Kenney is a 52 y.o. right-hand dominant male who presents with Follow-Up            HPI   Mr. Kenney returns to the office today for follow-up evaluation of spinal/joints/musculoskeletal pain, as well as nerve pain, note history of diabetes. The patient was previously seen by my colleague Dr. Sibley, reviewed records most recently from 10/2016, reviewed records.    Regarding today's visit:    The patient notes ongoing pain in the cervical region, primarily in the left mid and lower aspect, also notes intermittent radiating pain to the left arm, with neuropathic component. The patient notes prior cervical epidural steroid injection with transient benefit.    The patient notes left shoulder area pain, worse with activities. The patient notes prior orthopedic evaluation, recommending continue nonsurgical care.    The patient has low back pain, intermittent lower limb radiating pain with neuropathic component.    The patient notes left wrist/hand pain, left carpal tunnel symptoms, including at night    The patient has bilateral foot/ankle pain, notes pending podiatry evaluation.    The patient notes joint/musculoskeletal pain, notes intermittent flares, primarily activity associated.    The patient notes nerve pain in the upper and lower limbs, note history of diabetes    The patient has had prior treatment with medications. He has been to physical therapy, without benefit. No bowel/bladder dysfunction noted. The patient has chronic weakness about left shoulder, without change, otherwise no focal weakness noted. He is making an effort with home exercise program as tolerated. The ongoing pain limits his ability to function. He is inquiring about additional treatment options.      MEDICAL RECORDS REVIEW/DATA REVIEW: Reviewed in epic.    Records Reviewed: Reviewed referring provider notes.     I reviewed medications. Notes sedation with prior gabapentin use. Avoids NSAIDs due to kidney disease,  diabetes.    I reviewed  profile  4/24/2017.    I reviewed diagnostic studies:     I reviewed radiographs. Reviewed MRI cervical spine 6/2016. Reviewed CT cervical spine 9/2016. Reviewed MRI lumbar spine 6/2016. Reviewed MRI left shoulder 5/2010.    I reviewed lab studies. Reviewed labs 4/2017, including CMP, A1C 11.5, improved.     I reviewed medical issues.     I reviewed family history: No neuromuscular disorders noted.    I reviewed social issues. On disability      PAST MEDICAL HISTORY:   Past Medical History   Diagnosis Date   • HTN (hypertension)    • Chronic LBP    • Chronic shoulder pain    • DM (diabetes mellitus) (CMS-HCC)      TYPE II,diet controlled   • COPD (chronic obstructive pulmonary disease) (CMS-HCC) 3/31/10     mild   • Kidney disease        PAST SURGICAL HISTORY:    Past Surgical History   Procedure Laterality Date   • Other orthopedic surgery  knee       ALLERGIES:  Apple; Asa; and Metformin    MEDICATIONS:    Outpatient Encounter Prescriptions as of 4/24/2017   Medication Sig Dispense Refill   • baclofen (LIORESAL) 10 MG Tab Take 1/2  to 1 tablet by mouth three times per day as needed for muscle spasm 45 Tab 2   • gabapentin (NEURONTIN) 100 MG Cap Take 1 to 2 tablets by mouth three times per day as needed for nerve pain 45 Cap 2   • budesonide-formoterol (SYMBICORT) 160-4.5 MCG/ACT Aerosol INHALE 2 PUFFS BY MOUTH 2 TIMES A DAY. 1 Inhaler 11   • tiotropium (SPIRIVA) 18 MCG Cap Inhale 1 Cap by mouth 1 time daily as needed (Shortness of breath). 90 Cap 0   • atorvastatin (LIPITOR) 20 MG Tab Take 1 Tab by mouth every day. 30 Tab 3   • insulin glargine (LANTUS) 100 UNIT/ML Solution Inject 42 Units as instructed every day. 10 mL 6   • ranitidine (ZANTAC) 150 MG Tab Take 1 Tab by mouth 2 times a day as needed for Heartburn. 60 Tab 3   • lisinopril (PRINIVIL) 10 MG Tab Take 1 Tab by mouth every day. 30 Tab 3   • NOVOLOG, insulin aspart, (NOVOLOG FLEXPEN) 100 UNIT/ML Solution Pen-injector injection  Inject 5-15 Units as instructed 3 times a day before meals. 5 PEN 11   • albuterol 108 (90 BASE) MCG/ACT Aero Soln inhalation aerosol Inhale 2 Puffs by mouth every 6 hours as needed for Shortness of Breath. 8.5 g 6   • amlodipine (NORVASC) 10 MG Tab Take 1 Tab by mouth every day. 90 Tab 1   • Misc. Devices Misc Precision xtra glucometer and test strips and lancets to check his blood sugars 4 times daily #120 120 Device 11   • atorvastatin (LIPITOR) 10 MG Tab      • PRECISION XTRA TEST STRIPS strip      • LANTUS SOLOSTAR 100 UNIT/ML Solution Pen-injector injection INJECT 40 UNITS AS INSTRUCTED EVERY EVENING.  11   • NOVOFINE 32G X 6 MM Misc      • TECHLITE LANCETS Misc      • ranitidine (ZANTAC) 300 MG tablet      • Misc. Devices Misc Diabetic shoes to use as directed for B near ulcerating callouses on plantar surfaces of feet 2 Device 0   • [DISCONTINUED] cyclobenzaprine (FLEXERIL) 10 MG Tab Take 1 Tab by mouth 2 times a day as needed for Mild Pain. 30 Tab 3     No facility-administered encounter medications on file as of 4/24/2017.       SOCIAL HISTORY:    Social History     Social History   • Marital Status: Single     Spouse Name: N/A   • Number of Children: N/A   • Years of Education: N/A     Social History Main Topics   • Smoking status: Current Every Day Smoker -- 1.00 packs/day for 25 years     Types: Cigarettes     Last Attempt to Quit: 05/26/2012   • Smokeless tobacco: Never Used   • Alcohol Use: Yes      Comment: last night 3-4 drinks   • Drug Use: No   • Sexual Activity: Not Asked     Other Topics Concern   •  Service No   • Blood Transfusions No   • Caffeine Concern No   • Occupational Exposure No   • Hobby Hazards No   • Sleep Concern No     hard to sleep   • Stress Concern No   • Weight Concern No   • Special Diet Yes     diabetic    • Back Care Yes   • Exercise Yes     little bit   • Bike Helmet No     does not ride bike    • Seat Belt Yes   • Self-Exams No     Social History Narrative  "      Review of Systems   Constitutional: Negative for fever and chills.   HENT: Negative for hearing loss and tinnitus.    Eyes: Negative for double vision and photophobia.   Respiratory: Negative for hemoptysis and sputum production.    Cardiovascular: Negative for palpitations and orthopnea.   Gastrointestinal: Negative for abdominal pain and diarrhea.   Genitourinary: Negative for urgency and frequency.   Musculoskeletal: Positive for myalgias, back pain, joint pain and neck pain.   Skin: Negative.    Neurological: Positive for tingling and sensory change.   Endo/Heme/Allergies: Negative.    All other systems reviewed and are negative.        Objective:     /78 mmHg  Pulse 92  Temp(Src) 36.4 °C (97.5 °F)  Ht 1.943 m (6' 4.5\")  Wt 106.595 kg (235 lb)  BMI 28.24 kg/m2  SpO2 98%     Physical Exam  Constitutional: oriented to person, place, and time, appears well-developed and well-nourished.   HEENT: Normocephalic atraumatic, neck supple, no JVD noted, no masses noted, no meningeal signs noted  Lymphadenopathy: no cervical, supraclavicular, or inguinal lymphadenopathy noted  Cardiovascular: Intact distal pulses, including at wrists and ankles, no limb swelling noted  Pulmonary: No tachypnea noted, no accessory muscle use noted, no dyspnea noted  Abdominal: Soft, nontender, exhibits no distension, no peritoneal signs, no HSM  Musculoskeletal:   Right shoulder: exhibits mild tenderness. Mild pain with range of motion testing  Left shoulder: exhibits  tenderness.  pain with range of motion testing, weakness noted with strength testing, primarily with abduction, impingement signs noted  Right hip: exhibits only mild tenderness. Minimal pain with range of motion testing  Left hip: exhibits only mild tenderness. Minimal pain with range of motion testing  Cervical back: exhibits  decreased range of motion,  tenderness and pain. Spurling's testing produces axial pain on the left, trigger points noted  Lumbar " back: exhibits mild decreased range of motion, mild tenderness and mild pain. negative straight leg testing, trigger points noted  Wrist/hand: mild pain with range of motion testing, equivocal tinel's at wrist, negative tinel's at elbows  Neurological: oriented to person, place, and time. Cranial nerves grossly intact, normal strength. Sensation intact distally. Reflexes 1+ in upper and lower limbs, Gait antalgic, reciprocal, No upper motor neuron signs evident  Skin: Skin is intact. no rashes or lesions noted  Psychiatric: normal mood and affect. speech is normal and behavior is normal. Judgment and thought content normal. Cognition and memory are normal.        Assessment/Plan:       ASSESSMENT:    1. Neck pain, myofascial pain, intermittent cervical radiculitis, cervical spinal stenosis, disc protrusions, degenerative disc disease, cervical spondylosis, consider cervical facet syndrome    - Reviewed injection therapy with left cervical 5, 6, 7 and thoracic 1 medial branch blocks, facet blocks, without steroid, as diagnostic intervention for suspected facetogenic pain    2. Left shoulder pain, sprain strain, chronic full thickness tear of the supraspinatus tendon with retraction, tendinopathy, ac arthritis, impingement syndrome    - Reviewed injection therapy with left shoulder subacromial bursa injection, without steroid, perform at procedure center    3. Low back pain, myofascial pain, intermittent lumbar radiculitis, lumbar disc protrusion, degenerative disc disease, lumbar spondylosis    - Reviewed injection therapy with trigger point injections, without steroid, perform at procedure center    4. Left wrist/hand pain, sprain strain, carpal tunnel symptoms    - Left wrist splint as trial  - Reviewed ergonomic modifications    5. Nerve pain, diabetic peripheral neuropathy    6. Bilateral foot/ankle pain, sprain strain, podiatry consulted    - Reviewed orthotics/footwear    7. Joint/musculoskeletal pain    8.  Comorbid medical issues, including diabetes, uncontrolled, kidney disease, cardiac, pulmonary, sleep apnea, with care per primary care provider      DISCUSSION/PLAN:    - I discussed management options. I reviewed symptomatic care    - I reviewed home exercise program, with medical precautions    - The patient can consider complementary trials with acupuncture, massage therapy, her TENS unit    - I reviewed medication monitoring.  I reviewed medication adjustments. I wrote prescriptions for the following as trials:    - baclofen (LIORESAL) 10 MG Tab; Take 1/2  to 1 tablet by mouth three times per day as needed for muscle spasm  Dispense: 45 Tab; Refill: 2  - gabapentin (NEURONTIN) 100 MG Cap; Take 1 to 2 tablets by mouth three times per day as needed for nerve pain  Dispense: 45 Cap; Refill: 2, starting with low dosing    - Review/consider trial with Lidoderm patch, Cymbalta, if no contraindication    - I reviewed risks, side effects, and interactions of medications, including over-the-counter medications. I reviewed further symptomatic medications.    - I reviewed additional diagnostic options, including further/advanced imaging, electrodiagnostic testing, vascular studies, and further lab screen    - I reviewed additional therapeutic options, including further injection therapy and additional consultative input, including surgical    - I reviewed psychosocial interventions    - I encourage the patient to stop or decrease cigarette use    - I will plan on seeing the patient back in the procedure center for the above noted interventions or in the office were we can further review management options      Please note that this dictation was created using voice recognition software. I have made every reasonable attempt to correct obvious errors but there may be errors of grammar and content that I may have overlooked prior to finalization of this note.

## 2017-04-24 NOTE — PROGRESS NOTES
Special Procedures H&P:    Subjective: Mr. Kenney  notes ongoing pain in the cervical region, primarily in the left mid and lower aspect, also notes intermittent radiating pain to the left arm, with neuropathic component. The patient notes prior cervical epidural steroid injection with transient benefit. The patient notes left shoulder area pain, worse with activities. The patient notes prior orthopedic evaluation, recommending continue nonsurgical care.The patient has low back pain, intermittent lower limb radiating pain with neuropathic component. He has had prior conservative treatment trial.    MEDICAL RECORDS REVIEW/DATA REVIEW: Reviewed in epic.    I reviewed medications.    I reviewed diagnostic studies:     I reviewed radiographs. Reviewed MRI cervical spine 6/2016. Reviewed CT cervical spine 9/2016. Reviewed MRI lumbar spine 6/2016. Reviewed MRI left shoulder 5/2010.    I reviewed lab studies. Reviewed labs 4/2017, including CMP, A1C 11.5, improved.     I reviewed medical issues. Follow by primary care provider, comorbid medical issues include diabetes, neuropathy, cardiac, pulmonary, kidney disease.    I reviewed family history: No neuromuscular disorders noted.    I reviewed social issues. On disability    PAST MEDICAL HISTORY:    Past Medical History     Past Medical History    Diagnosis  Date    •  HTN (hypertension)      •  Chronic LBP      •  Chronic shoulder pain      •  DM (diabetes mellitus) (CMS-[facility])          TYPE II,diet controlled    •  COPD (chronic obstructive pulmonary disease) (CMS-[facility])  3/31/10        mild    •  Kidney disease             PAST SURGICAL HISTORY:     Past Surgical History     Past Surgical History    Procedure  Laterality  Date    •  Other orthopedic surgery    knee           ALLERGIES:  Apple; Asa; and Metformin    MEDICATIONS:     Encounter Medications     Outpatient Encounter Prescriptions as of 4/24/2017    Medication  Sig  Dispense  Refill    •  baclofen (LIORESAL) 10 MG  Tab  Take 1/2  to 1 tablet by mouth three times per day as needed for muscle spasm  45 Tab  2    •  gabapentin (NEURONTIN) 100 MG Cap  Take 1 to 2 tablets by mouth three times per day as needed for nerve pain  45 Cap  2    •  budesonide-formoterol (SYMBICORT) 160-4.5 MCG/ACT Aerosol  INHALE 2 PUFFS BY MOUTH 2 TIMES A DAY.  1 Inhaler  11    •  tiotropium (SPIRIVA) 18 MCG Cap  Inhale 1 Cap by mouth 1 time daily as needed (Shortness of breath).  90 Cap  0    •  atorvastatin (LIPITOR) 20 MG Tab  Take 1 Tab by mouth every day.  30 Tab  3    •  insulin glargine (LANTUS) 100 UNIT/ML Solution  Inject 42 Units as instructed every day.  10 mL  6    •  ranitidine (ZANTAC) 150 MG Tab  Take 1 Tab by mouth 2 times a day as needed for Heartburn.  60 Tab  3    •  lisinopril (PRINIVIL) 10 MG Tab  Take 1 Tab by mouth every day.  30 Tab  3    •  NOVOLOG, insulin aspart, (NOVOLOG FLEXPEN) 100 UNIT/ML Solution Pen-injector injection  Inject 5-15 Units as instructed 3 times a day before meals.  5 PEN  11    •  albuterol 108 (90 BASE) MCG/ACT Aero Soln inhalation aerosol  Inhale 2 Puffs by mouth every 6 hours as needed for Shortness of Breath.  8.5 g  6    •  amlodipine (NORVASC) 10 MG Tab  Take 1 Tab by mouth every day.  90 Tab  1    •  Misc. Devices Misc  Precision xtra glucometer and test strips and lancets to check his blood sugars 4 times daily #120  120 Device  11    •  atorvastatin (LIPITOR) 10 MG Tab          •  PRECISION XTRA TEST STRIPS strip          •  LANTUS SOLOSTAR 100 UNIT/ML Solution Pen-injector injection  INJECT 40 UNITS AS INSTRUCTED EVERY EVENING.    11    •  NOVOFINE 32G X 6 MM Misc          •  TECHLITE LANCETS Misc          •  ranitidine (ZANTAC) 300 MG tablet          •  Misc. Devices Misc  Diabetic shoes to use as directed for B near ulcerating callouses on plantar surfaces of feet  2 Device  0    •  [DISCONTINUED] cyclobenzaprine (FLEXERIL) 10 MG Tab  Take 1 Tab by mouth 2 times a day as needed for Mild Pain.  30  "Tab  3        No facility-administered encounter medications on file as of 4/24/2017.           SOCIAL HISTORY:     Social History     Social History        Social History    •  Marital Status:  Single        Spouse Name:  N/A    •  Number of Children:  N/A    •  Years of Education:  N/A        Social History Main Topics    •  Smoking status:  Current Every Day Smoker -- 1.00 packs/day for 25 years        Types:  Cigarettes        Last Attempt to Quit:  05/26/2012    •  Smokeless tobacco:  Never Used    •  Alcohol Use:  Yes          Comment: last night 3-4 drinks    •  Drug Use:  No    •  Sexual Activity:  Not Asked        Other Topics  Concern    •   Service  No    •  Blood Transfusions  No    •  Caffeine Concern  No    •  Occupational Exposure  No    •  Hobby Hazards  No    •  Sleep Concern  No        hard to sleep    •  Stress Concern  No    •  Weight Concern  No    •  Special Diet  Yes        diabetic     •  Back Care  Yes    •  Exercise  Yes        little bit    •  Bike Helmet  No        does not ride bike     •  Seat Belt  Yes    •  Self-Exams  No        Social History Narrative           Review of Systems   Constitutional: Negative for fever and chills.   HENT: Negative for hearing loss and tinnitus.    Eyes: Negative for double vision and photophobia.   Respiratory: Negative for hemoptysis and sputum production.    Cardiovascular: Negative for palpitations and orthopnea.   Gastrointestinal: Negative for abdominal pain and diarrhea.   Genitourinary: Negative for urgency and frequency.   Musculoskeletal: Positive for myalgias, back pain, joint pain and neck pain.   Skin: Negative.    Neurological: Positive for tingling and sensory change.   Endo/Heme/Allergies: Negative.    All other systems reviewed and are negative.          Objective:      /78 mmHg  Pulse 92  Temp(Src) 36.4 °C (97.5 °F)  Ht 1.943 m (6' 4.5\")  Wt 106.595 kg (235 lb)  BMI 28.24 kg/m2  SpO2 98%     Physical " Exam  Constitutional: oriented to person, place, and time, appears well-developed and well-nourished.    HEENT: Normocephalic atraumatic, neck supple, no JVD noted, no masses noted, no meningeal signs noted  Lymphadenopathy: no cervical, supraclavicular, or inguinal lymphadenopathy noted  Cardiovascular: Intact distal pulses, including at wrists and ankles, no limb swelling noted  Pulmonary: No tachypnea noted, no accessory muscle use noted, no dyspnea noted  Abdominal: Soft, nontender, exhibits no distension, no peritoneal signs, no HSM  Musculoskeletal:   Right shoulder: exhibits mild tenderness. Mild pain with range of motion testing  Left shoulder: exhibits  tenderness.  pain with range of motion testing, weakness noted with strength testing, primarily with abduction, impingement signs noted  Right hip: exhibits only mild tenderness. Minimal pain with range of motion testing  Left hip: exhibits only mild tenderness. Minimal pain with range of motion testing  Cervical back: exhibits  decreased range of motion,  tenderness and pain. Spurling's testing produces axial pain on the left, trigger points noted  Lumbar back: exhibits mild decreased range of motion, mild tenderness and mild pain. negative straight leg testing, trigger points noted  Wrist/hand: mild pain with range of motion testing, equivocal tinel's at wrist, negative tinel's at elbows  Neurological: oriented to person, place, and time. Cranial nerves grossly intact, normal strength. Sensation intact distally. Reflexes 1+ in upper and lower limbs, Gait antalgic, reciprocal, No upper motor neuron signs evident  Skin: Skin is intact. no rashes or lesions noted  Psychiatric: normal mood and affect. speech is normal and behavior is normal. Judgment and thought content normal. Cognition and memory are normal.         Assessment:    1. Cervical spondylosis, suspect cervical facet syndrome  2. Left shoulder impingement syndrome  3. Myofascial pain, lumbosacral  region    Plan:    1. Left cervical 5, 6, 7, and thoracic 1 medial branch blocks, facet blocks, without steroid  2. Left shoulder subacromial bursa injection, without steroid  3. Trigger point injections, lumbosacral region, without steroid

## 2017-04-24 NOTE — MR AVS SNAPSHOT
"        Wagner Pablito   2017 1:30 PM   Office Visit   MRN: [id number]    Department:  [facility]   Dept Phone:  [phone]    Description:  Male : 1965   Provider:  Won Atwood M.D.           Reason for Visit     New Patient           Allergies as of 2017     Allergen Noted Reactions    Apple 2013   Swelling    Per patient: swelling of mouth and jittery feeling.  Apple allergy to raw apples; apple juice and applesauce okay per patient    Asa [Aspirin] 2009       \"funny taste in my mouth. My stomach has an 'empty' feeling.\"  \"throat closing\"    Metformin 2010       Pt states he \"cramps up\"      You were diagnosed with     Muscle spasm   [790943]       Nerve pain   [658566]       Diabetic peripheral neuropathy (CMS-HCC)   [923124]       Carpal tunnel syndrome of left wrist   [936370]         Vital Signs     Blood Pressure Pulse Temperature Height Weight Body Mass Index    120/78 mmHg 92 36.4 °C (97.5 °F) 1.943 m (6' 4.5\") 106.595 kg (235 lb) 28.24 kg/m2    Oxygen Saturation Smoking Status                98% Current Every Day Smoker          Basic Information     Date Of Birth Sex Race Ethnicity Preferred Language    1965 Male Black or  Non- English      Your appointments     May 11, 2017  2:10 PM   Established Patient with Eleuterio Lara M.D.   [facility] ([facility])    [address] [facility] [id number] [phone]           You will be receiving a confirmation call a few days before your appointment from our automated call confirmation system.            May 23, 2017 10:15 AM   Follow Up Visit with Won Atwood M.D.   [facility] (--)    [address] [address] [phone]           You will be receiving a confirmation call a few days before your appointment from our automated call confirmation system.              Problem List              ICD-10-CM Priority Class Noted - " Resolved    GERD (gastroesophageal reflux disease) K21.9   7/15/2009 - Present    HTN (hypertension) I10 Medium  7/15/2009 - Present    Chronic low back pain M54.5, G89.29   7/15/2009 - Present    COPD (chronic obstructive pulmonary disease) (CMS-HCC) J44.9   3/31/2010 - Present    ANABELA (obstructive sleep apnea) G47.33   4/14/2010 - Present    Right-sided heart failure (CMS-HCC) I50.9   5/17/2010 - Present    Hand pain M79.643   8/30/2010 - Present    Foot pain M79.673   8/30/2010 - Present    Diabetes    4/18/2011 - Present    DIABETES MELLITUS    11/8/2011 - Present    CKD (chronic kidney disease) N18.9   1/13/2012 - Present    HTN (hypertension) I10   1/13/2012 - Present    Hyperlipidemia with target LDL less than 100 E78.5 Low  1/13/2012 - Present    DM (diabetes mellitus) (CMS-HCC) E11.9   7/25/2012 - Present    Chronic kidney disease (CKD), stage III (moderate) N18.3 Medium  7/26/2012 - Present    Renal cyst N28.1   7/26/2012 - Present    Asthma exacerbation J45.901 High  10/16/2012 - Present    DIABETES MELLITUS    10/17/2012 - Present    COPD exacerbation (CMS-HCC) J44.1 Medium  4/4/2013 - Present    Hypertriglyceridemia E78.1   6/13/2013 - Present    Hypercalcemia E83.52   7/30/2013 - Present    Vitamin d deficiency    7/30/2013 - Present    Back pain M54.9   7/17/2015 - Present    DKA (diabetic ketoacidoses) (CMS-HCC) E13.10   7/17/2015 - Present    Tobacco abuse Z72.0 Low  7/17/2015 - Present    Increased anion gap metabolic acidosis E87.2   7/17/2015 - Present    Sepsis (CMS-HCC) A41.9 High  7/22/2015 - Present    CAP (community acquired pneumonia) J18.9 High  7/22/2015 - Present    DM (diabetes mellitus) (CMS-HCC) E11.9 Medium  7/23/2015 - Present    Cocaine use F14.10   9/30/2015 - Present    Hyperlipidemia associated with type 2 diabetes mellitus (CMS-HCC) E11.69, E78.5   10/6/2015 - Present    Type 2 diabetes mellitus with hyperglycemia (CMS-HCC) E11.65 Medium  2/13/2016 - Present    Type 2 diabetes  mellitus with neurologic complication (CMS-HCC) E11.49 Medium  2/13/2016 - Present    Acute on chronic respiratory failure with hypoxia (CMS-HCC) J96.21 High  2/13/2016 - Present    COPD with acute exacerbation (CMS-HCC) J44.1 High  2/13/2016 - Present    Viral sepsis (CMS-HCC) A41.89 High  2/13/2016 - Present    Influenza A J10.1 High  2/13/2016 - Present    Osteoarthritis of spine with radiculopathy, cervical region M47.22   9/9/2016 - Present    DDD (degenerative disc disease), cervical M50.30   9/9/2016 - Present    Cervical radiculopathy M54.12   9/9/2016 - Present    Chronic neck pain M54.2, G89.29   9/9/2016 - Present      Health Maintenance        Date Due Completion Dates    IMM HEP B VACCINE (1 of 3 - Primary Series) 1965 ---    IMM DTaP/Tdap/Td Vaccine (1 - Tdap) 3/18/1986 ---    IMM PNEUMOCOCCAL 19-64 (ADULT) MEDIUM RISK SERIES (1 of 1 - PPSV23) 3/18/1986 ---    COLONOSCOPY 3/18/2017 ---    A1C SCREENING 10/13/2017 4/13/2017, 11/7/2016, 5/25/2016, 2/11/2016, 12/2/2015, 7/17/2015, 2/21/2014, 7/24/2013, 4/4/2013, 10/17/2012, 7/25/2012, 8/3/2011, 10/28/2010, 6/28/2010, 4/20/2010, 1/5/2009, 6/30/2008, 12/10/2006, 10/19/2005    DIABETES MONOFILAMENT / LE EXAM 4/5/2018 4/5/2017, 3/31/2015 (Done), 10/14/2013 (N/S)    Override on 3/31/2015: Done    Override on 10/14/2013: (N/S)    FASTING LIPID PROFILE 4/13/2018 4/13/2017, 11/7/2016, 5/25/2016, 2/12/2016, 12/2/2015, 2/21/2014, 7/24/2013, 7/25/2012, 8/3/2011, 4/20/2010, 6/9/2009, 6/30/2008    URINE ACR / MICROALBUMIN 4/13/2018 4/13/2017, 11/7/2016, 5/25/2016, 12/2/2015, 7/24/2013, 7/25/2012, 10/28/2010    SERUM CREATININE 4/13/2018 4/13/2017, 3/12/2017, 12/9/2016, 11/7/2016, 9/6/2016, 5/25/2016, 2/13/2016, 2/12/2016, 2/11/2016, 12/2/2015, 9/5/2015, 7/26/2015, 7/25/2015, 7/24/2015, 7/22/2015, 7/18/2015, 7/17/2015, 7/17/2015, 2/22/2015, 1/6/2015, 7/15/2014, 7/14/2014, 7/13/2014, 5/3/2014, 11/4/2013, 7/24/2013, 5/27/2013, 4/4/2013, 1/29/2013, 10/18/2012,  10/17/2012, 10/16/2012, 8/21/2012, 7/25/2012, 8/3/2011, 12/22/2010, 4/20/2010, 6/9/2009, 9/29/2008, 6/30/2008, 12/10/2006, 12/9/2006, 10/27/2005, 10/26/2005, 10/25/2005, 10/25/2005, 10/25/2005, 10/18/2005, 10/18/2005    RETINAL SCREENING 4/14/2018 4/14/2017, 4/14/2016            Current Immunizations     Influenza TIV (IM) 10/14/2013, 10/18/2012 10:41 AM, 11/8/2011 10:06 AM    Influenza Vaccine Pediatric 11/9/2010, 12/16/2009    Influenza Vaccine Quad Inj (Pf) 10/22/2014  9:18 AM    Influenza Vaccine Quad Inj (Preserved) 2/13/2016 12:36 PM    Pneumococcal Vaccine (UF)Historical Data 12/23/2010      Below and/or attached are the medications your provider expects you to take. Review all of your home medications and newly ordered medications with your provider and/or pharmacist. Follow medication instructions as directed by your provider and/or pharmacist. Please keep your medication list with you and share with your provider. Update the information when medications are discontinued, doses are changed, or new medications (including over-the-counter products) are added; and carry medication information at all times in the event of emergency situations     Allergies:  APPLE - Swelling     ASA - (reactions not documented)     METFORMIN - (reactions not documented)               Medications  Valid as of: April 24, 2017 -  2:27 PM    Generic Name Brand Name Tablet Size Instructions for use    Albuterol Sulfate (Aero Soln) albuterol 108 (90 BASE) MCG/ACT Inhale 2 Puffs by mouth every 6 hours as needed for Shortness of Breath.        AmLODIPine Besylate (Tab) NORVASC 10 MG Take 1 Tab by mouth every day.        Atorvastatin Calcium (Tab) LIPITOR 20 MG Take 1 Tab by mouth every day.        Atorvastatin Calcium (Tab) LIPITOR 10 MG         Baclofen (Tab) LIORESAL 10 MG Take 1/2  to 1 tablet by mouth three times per day as needed for muscle spasm        Budesonide-Formoterol Fumarate (Aerosol) SYMBICORT 160-4.5 MCG/ACT INHALE 2  PUFFS BY MOUTH 2 TIMES A DAY.        Gabapentin (Cap) NEURONTIN 100 MG Take 1 to 2 tablets by mouth three times per day as needed for nerve pain        Glucose Blood (Strip) PRECISION XTRA TEST STRIPS          Insulin Aspart (Solution Pen-injector) NOVOLOG 100 UNIT/ML Inject 5-15 Units as instructed 3 times a day before meals.        Insulin Glargine (Solution) LANTUS 100 UNIT/ML Inject 42 Units as instructed every day.        Insulin Glargine (Solution Pen-injector) LANTUS SOLOSTAR 100 UNIT/ML INJECT 40 UNITS AS INSTRUCTED EVERY EVENING.        Insulin Pen Needle (Misc) NOVOFINE 32G X 6 MM         Lancets (Misc) TECHLITE LANCETS          Lisinopril (Tab) PRINIVIL 10 MG Take 1 Tab by mouth every day.        Misc. Devices (Misc) Misc. Devices  Precision xtra glucometer and test strips and lancets to check his blood sugars 4 times daily #120        Misc. Devices (Misc) Misc. Devices  Diabetic shoes to use as directed for B near ulcerating callouses on plantar surfaces of feet        RaNITidine HCl (Tab) ZANTAC 150 MG Take 1 Tab by mouth 2 times a day as needed for Heartburn.        RaNITidine HCl (Tab) ZANTAC 300 MG         Tiotropium Bromide Monohydrate (Cap) SPIRIVA 18 MCG Inhale 1 Cap by mouth 1 time daily as needed (Shortness of breath).        .                 Medicines prescribed today were sent to:     [facility] [address] [address]    Phone: [phone] Fax: [phone]    Open 24 Hours?: No      Medication refill instructions:       If your prescription bottle indicates you have medication refills left, it is not necessary to call your provider’s office. Please contact your pharmacy and they will refill your medication.    If your prescription bottle indicates you do not have any refills left, you may request refills at any time through one of the following ways: The online POPAPP system (except Urgent Care), by calling your provider’s office, or by  asking your pharmacy to contact your provider’s office with a refill request. Medication refills are processed only during regular business hours and may not be available until the next business day. Your provider may request additional information or to have a follow-up visit with you prior to refilling your medication.   *Please Note: Medication refills are assigned a new Rx number when refilled electronically. Your pharmacy may indicate that no refills were authorized even though a new prescription for the same medication is available at the pharmacy. Please request the medicine by name with the pharmacy before contacting your provider for a refill.           MyChart Status: Patient Declined        Quit Tobacco Information     Do you want to quit using tobacco?    Quitting tobacco decreases risks of cancer, heart and lung disease, increases life expectancy, improves sense of taste and smell, and increases spending money, among other benefits.    If you are thinking about quitting, we can help.  • [facility] Quit Tobacco Program: [phone]  o Program occurs weekly for four weeks and includes pharmacist consultation on products to support quitting smoking or chewing tobacco. A provider referral is needed for pharmacist consultation.  • Tobacco Users Help Hotline: 3-131-QUITNOW (364-1751) or https://[place].quitlogix.org/  o Free, confidential telephone and online coaching for [place] residents. Sessions are designed on a schedule that is convenient for you. Eligible clients receive free nicotine replacement therapy.  • Nationally: www.smokefree.gov  o Information and professional assistance to support both immediate and long-term needs as you become, and remain, a non-smoker. Smokefree.gov allows you to choose the help that best fits your needs.

## 2017-05-02 ENCOUNTER — HOSPITAL ENCOUNTER (OUTPATIENT)
Dept: PAIN MANAGEMENT | Facility: REHABILITATION | Age: 52
End: 2017-05-02
Attending: PHYSICAL MEDICINE & REHABILITATION
Payer: MEDICAID

## 2017-05-11 ENCOUNTER — OFFICE VISIT (OUTPATIENT)
Dept: MEDICAL GROUP | Facility: MEDICAL CENTER | Age: 52
End: 2017-05-11
Attending: FAMILY MEDICINE
Payer: MEDICAID

## 2017-05-11 VITALS
WEIGHT: 220 LBS | BODY MASS INDEX: 26.79 KG/M2 | HEIGHT: 76 IN | SYSTOLIC BLOOD PRESSURE: 122 MMHG | TEMPERATURE: 97.9 F | OXYGEN SATURATION: 94 % | HEART RATE: 88 BPM | RESPIRATION RATE: 16 BRPM | DIASTOLIC BLOOD PRESSURE: 78 MMHG

## 2017-05-11 DIAGNOSIS — Z23 NEED FOR TDAP VACCINATION: ICD-10-CM

## 2017-05-11 DIAGNOSIS — E78.5 HYPERLIPIDEMIA WITH TARGET LDL LESS THAN 100: ICD-10-CM

## 2017-05-11 DIAGNOSIS — E11.65 UNCONTROLLED TYPE 2 DIABETES MELLITUS WITH DIABETIC NEUROPATHY, UNSPECIFIED LONG TERM INSULIN USE STATUS: ICD-10-CM

## 2017-05-11 DIAGNOSIS — E11.40 UNCONTROLLED TYPE 2 DIABETES MELLITUS WITH DIABETIC NEUROPATHY, UNSPECIFIED LONG TERM INSULIN USE STATUS: ICD-10-CM

## 2017-05-11 DIAGNOSIS — J44.0 CHRONIC OBSTRUCTIVE PULMONARY DISEASE WITH ACUTE LOWER RESPIRATORY INFECTION (HCC): ICD-10-CM

## 2017-05-11 DIAGNOSIS — J06.9 UPPER RESPIRATORY TRACT INFECTION, UNSPECIFIED TYPE: ICD-10-CM

## 2017-05-11 DIAGNOSIS — N18.30 CHRONIC KIDNEY DISEASE (CKD), STAGE III (MODERATE) (HCC): ICD-10-CM

## 2017-05-11 DIAGNOSIS — I10 ESSENTIAL HYPERTENSION: ICD-10-CM

## 2017-05-11 PROCEDURE — 99214 OFFICE O/P EST MOD 30 MIN: CPT | Mod: 25 | Performed by: FAMILY MEDICINE

## 2017-05-11 PROCEDURE — 90715 TDAP VACCINE 7 YRS/> IM: CPT | Performed by: FAMILY MEDICINE

## 2017-05-11 PROCEDURE — 90471 IMMUNIZATION ADMIN: CPT | Performed by: FAMILY MEDICINE

## 2017-05-11 PROCEDURE — 99213 OFFICE O/P EST LOW 20 MIN: CPT | Mod: 25 | Performed by: FAMILY MEDICINE

## 2017-05-11 RX ORDER — INSULIN GLARGINE 100 [IU]/ML
42 INJECTION, SOLUTION SUBCUTANEOUS DAILY
Qty: 10 ML | Refills: 6 | Status: SHIPPED | OUTPATIENT
Start: 2017-05-11 | End: 2017-05-11

## 2017-05-11 RX ORDER — LISINOPRIL 20 MG/1
20 TABLET ORAL DAILY
Qty: 30 TAB | Refills: 3 | Status: SHIPPED | OUTPATIENT
Start: 2017-05-11 | End: 2017-06-12 | Stop reason: SDUPTHER

## 2017-05-11 RX ORDER — INSULIN GLARGINE 100 [IU]/ML
42 INJECTION, SOLUTION SUBCUTANEOUS EVERY EVENING
Qty: 10 PEN | Refills: 11 | Status: SHIPPED | OUTPATIENT
Start: 2017-05-11 | End: 2017-06-12 | Stop reason: SDUPTHER

## 2017-05-11 ASSESSMENT — ENCOUNTER SYMPTOMS
SORE THROAT: 1
SWOLLEN GLANDS: 0
WHEEZING: 0
BACK PAIN: 1
HEADACHES: 0
SPUTUM PRODUCTION: 0
RHINORRHEA: 1
NAUSEA: 0
SINUS PAIN: 0
DIARRHEA: 0
SHORTNESS OF BREATH: 0
NECK PAIN: 0
COUGH: 1
TINGLING: 1
ABDOMINAL PAIN: 0
VOMITING: 0
FEVER: 0
PALPITATIONS: 0
CHILLS: 0

## 2017-05-11 ASSESSMENT — PAIN SCALES - GENERAL: PAINLEVEL: NO PAIN

## 2017-05-11 NOTE — MR AVS SNAPSHOT
"        Wagner Kenney   2017 2:10 PM   Office Visit   MRN: [id number]    Department:  [facility]   Dept Phone:  [phone]    Description:  Male : 1965   Provider:  Eleuterio Lara M.D.           Reason for Visit     Follow-Up results      Allergies as of 2017     Allergen Noted Reactions    Apple 2013   Swelling    Per patient: swelling of mouth and jittery feeling.  Apple allergy to raw apples; apple juice and applesauce okay per patient    Asa [Aspirin] 2009       \"funny taste in my mouth. My stomach has an 'empty' feeling.\"  \"throat closing\"    Metformin 2010       Pt states he \"cramps up\"      You were diagnosed with     Upper respiratory tract infection, unspecified type   [8948150]       Chronic kidney disease (CKD), stage III (moderate)   [994896]       Uncontrolled type 2 diabetes mellitus with diabetic neuropathy, unspecified long term insulin use status (CMS-HCC)   [5768545]       Hyperlipidemia with target LDL less than 100   [319788]       Chronic obstructive pulmonary disease with acute lower respiratory infection (CMS-HCC)   [470641]       Need for Tdap vaccination   [415033]       Essential hypertension   [2608817]         Vital Signs     Blood Pressure Pulse Temperature Respirations Height Weight    122/78 mmHg 88 36.6 °C (97.9 °F) 16 1.943 m (6' 4.5\") 99.791 kg (220 lb)    Body Mass Index Oxygen Saturation Smoking Status             26.43 kg/m2 94% Current Every Day Smoker         Basic Information     Date Of Birth Sex Race Ethnicity Preferred Language    1965 Male Black or  Non- English      Your appointments     May 23, 2017  4:00 PM   PROCEDURE 30 with DR TEREZA ROSE   PAIN MANAGEMENT  (--)    [address] [place] [id number]   [phone]           Your procedure is scheduled at [facility] at [facility] located at [address] [place] [address] just east of the main campus. Please check in at the front lobby desk 1 hour " prior to your appointment time. For your safety, please have a ride home with a responsible adult.             Jun 06, 2017  8:00 AM   Follow Up Visit with Won Atwood M.D.   [facility] (--)    [address]  [place] [id number] [phone]           You will be receiving a confirmation call a few days before your appointment from our automated call confirmation system.            Jun 12, 2017 12:50 PM   Established Patient with Eleuterio Lara M.D.   [facility] ([facility])    [address]   [place] [id number] [phone]           You will be receiving a confirmation call a few days before your appointment from our automated call confirmation system.              Problem List              ICD-10-CM Priority Class Noted - Resolved    GERD (gastroesophageal reflux disease) K21.9   7/15/2009 - Present    HTN (hypertension) I10 Medium  7/15/2009 - Present    Chronic low back pain M54.5, G89.29   7/15/2009 - Present    COPD (chronic obstructive pulmonary disease) (CMS-HCC) J44.9   3/31/2010 - Present    AANBELA (obstructive sleep apnea) G47.33   4/14/2010 - Present    Right-sided heart failure (CMS-HCC) I50.9   5/17/2010 - Present    Hand pain M79.643   8/30/2010 - Present    Foot pain M79.673   8/30/2010 - Present    Diabetes    4/18/2011 - Present    DIABETES MELLITUS    11/8/2011 - Present    CKD (chronic kidney disease) N18.9   1/13/2012 - Present    HTN (hypertension) I10   1/13/2012 - Present    Hyperlipidemia with target LDL less than 100 E78.5 Low  1/13/2012 - Present    DM (diabetes mellitus) (CMS-HCC) E11.9   7/25/2012 - Present    Chronic kidney disease (CKD), stage III (moderate) N18.3 Medium  7/26/2012 - Present    Renal cyst N28.1   7/26/2012 - Present    Asthma exacerbation J45.901 High  10/16/2012 - Present    DIABETES MELLITUS    10/17/2012 - Present    COPD exacerbation (CMS-HCC) J44.1 Medium  4/4/2013 - Present    Hypertriglyceridemia E78.1    6/13/2013 - Present    Hypercalcemia E83.52   7/30/2013 - Present    Vitamin d deficiency    7/30/2013 - Present    Back pain M54.9   7/17/2015 - Present    DKA (diabetic ketoacidoses) (CMS-HCC) E13.10   7/17/2015 - Present    Tobacco abuse Z72.0 Low  7/17/2015 - Present    Increased anion gap metabolic acidosis E87.2   7/17/2015 - Present    Sepsis (CMS-HCC) A41.9 High  7/22/2015 - Present    CAP (community acquired pneumonia) J18.9 High  7/22/2015 - Present    DM (diabetes mellitus) (CMS-HCC) E11.9 Medium  7/23/2015 - Present    Cocaine use F14.10   9/30/2015 - Present    Hyperlipidemia associated with type 2 diabetes mellitus (CMS-HCC) E11.69, E78.5   10/6/2015 - Present    Type 2 diabetes mellitus with hyperglycemia (CMS-HCC) E11.65 Medium  2/13/2016 - Present    Type 2 diabetes mellitus with neurologic complication (CMS-HCC) E11.49 Medium  2/13/2016 - Present    Acute on chronic respiratory failure with hypoxia (CMS-HCC) J96.21 High  2/13/2016 - Present    COPD with acute exacerbation (CMS-HCC) J44.1 High  2/13/2016 - Present    Viral sepsis (CMS-HCC) A41.89, B97.89 High  2/13/2016 - Present    Influenza A J10.1 High  2/13/2016 - Present    Osteoarthritis of spine with radiculopathy, cervical region M47.22   9/9/2016 - Present    DDD (degenerative disc disease), cervical M50.30   9/9/2016 - Present    Cervical radiculopathy M54.12   9/9/2016 - Present    Chronic neck pain M54.2, G89.29   9/9/2016 - Present      Health Maintenance        Date Due Completion Dates    IMM HEP B VACCINE (1 of 3 - Primary Series) 1965 ---    IMM DTaP/Tdap/Td Vaccine (1 - Tdap) 3/18/1986 ---    IMM PNEUMOCOCCAL 19-64 (ADULT) MEDIUM RISK SERIES (1 of 1 - PPSV23) 3/18/1986 ---    COLONOSCOPY 3/18/2017 ---    A1C SCREENING 10/13/2017 4/13/2017, 11/7/2016, 5/25/2016, 2/11/2016, 12/2/2015, 7/17/2015, 2/21/2014, 7/24/2013, 4/4/2013, 10/17/2012, 7/25/2012, 8/3/2011, 10/28/2010, 6/28/2010, 4/20/2010, 1/5/2009, 6/30/2008, 12/10/2006,  10/19/2005    DIABETES MONOFILAMENT / LE EXAM 4/5/2018 4/5/2017, 3/31/2015 (Done), 10/14/2013 (N/S)    Override on 3/31/2015: Done    Override on 10/14/2013: (N/S)    FASTING LIPID PROFILE 4/13/2018 4/13/2017, 11/7/2016, 5/25/2016, 2/12/2016, 12/2/2015, 2/21/2014, 7/24/2013, 7/25/2012, 8/3/2011, 4/20/2010, 6/9/2009, 6/30/2008    URINE ACR / MICROALBUMIN 4/13/2018 4/13/2017, 11/7/2016, 5/25/2016, 12/2/2015, 7/24/2013, 7/25/2012, 10/28/2010    SERUM CREATININE 4/13/2018 4/13/2017, 3/12/2017, 12/9/2016, 11/7/2016, 9/6/2016, 5/25/2016, 2/13/2016, 2/12/2016, 2/11/2016, 12/2/2015, 9/5/2015, 7/26/2015, 7/25/2015, 7/24/2015, 7/22/2015, 7/18/2015, 7/17/2015, 7/17/2015, 2/22/2015, 1/6/2015, 7/15/2014, 7/14/2014, 7/13/2014, 5/3/2014, 11/4/2013, 7/24/2013, 5/27/2013, 4/4/2013, 1/29/2013, 10/18/2012, 10/17/2012, 10/16/2012, 8/21/2012, 7/25/2012, 8/3/2011, 12/22/2010, 4/20/2010, 6/9/2009, 9/29/2008, 6/30/2008, 12/10/2006, 12/9/2006, 10/27/2005, 10/26/2005, 10/25/2005, 10/25/2005, 10/25/2005, 10/18/2005, 10/18/2005    RETINAL SCREENING 4/14/2018 4/14/2017, 4/14/2016            Current Immunizations     Influenza TIV (IM) 10/14/2013, 10/18/2012 10:41 AM, 11/8/2011 10:06 AM    Influenza Vaccine Pediatric 11/9/2010, 12/16/2009    Influenza Vaccine Quad Inj (Pf) 10/22/2014  9:18 AM    Influenza Vaccine Quad Inj (Preserved) 2/13/2016 12:36 PM    Pneumococcal Vaccine (UF)Historical Data 12/23/2010    Tdap Vaccine 5/11/2017  2:31 PM      Below and/or attached are the medications your provider expects you to take. Review all of your home medications and newly ordered medications with your provider and/or pharmacist. Follow medication instructions as directed by your provider and/or pharmacist. Please keep your medication list with you and share with your provider. Update the information when medications are discontinued, doses are changed, or new medications (including over-the-counter products) are added; and carry medication information  at all times in the event of emergency situations     Allergies:  APPLE - Swelling     ASA - (reactions not documented)     METFORMIN - (reactions not documented)               Medications  Valid as of: May 11, 2017 -  2:36 PM    Generic Name Brand Name Tablet Size Instructions for use    Albuterol Sulfate (Aero Soln) albuterol 108 (90 BASE) MCG/ACT Inhale 2 Puffs by mouth every 6 hours as needed for Shortness of Breath.        AmLODIPine Besylate (Tab) NORVASC 10 MG Take 1 Tab by mouth every day.        Atorvastatin Calcium (Tab) LIPITOR 20 MG Take 1 Tab by mouth every day.        Atorvastatin Calcium (Tab) LIPITOR 10 MG         Baclofen (Tab) LIORESAL 10 MG Take 1/2  to 1 tablet by mouth three times per day as needed for muscle spasm        Budesonide-Formoterol Fumarate (Aerosol) SYMBICORT 160-4.5 MCG/ACT INHALE 2 PUFFS BY MOUTH 2 TIMES A DAY.        Gabapentin (Cap) NEURONTIN 100 MG Take 1 to 2 tablets by mouth three times per day as needed for nerve pain        Glucose Blood (Strip) PRECISION XTRA TEST STRIPS          Insulin Aspart (Solution Pen-injector) NOVOLOG 100 UNIT/ML Inject 5-15 Units as instructed 3 times a day before meals.        Insulin Glargine (Solution) LANTUS 100 UNIT/ML Inject 42 Units as instructed every day.        Insulin Pen Needle (Misc) NOVOFINE 32G X 6 MM Use as directed with insulin 4 times daily        Lancets (Misc) TECHLITE LANCETS          Lisinopril (Tab) PRINIVIL 20 MG Take 1 Tab by mouth every day.        Misc. Devices (Misc) Misc. Devices  Precision xtra glucometer and test strips and lancets to check his blood sugars 4 times daily #120        Misc. Devices (Misc) Misc. Devices  Diabetic shoes to use as directed for B near ulcerating callouses on plantar surfaces of feet        RaNITidine HCl (Tab) ZANTAC 150 MG Take 1 Tab by mouth 2 times a day as needed for Heartburn.        RaNITidine HCl (Tab) ZANTAC 300 MG         Tiotropium Bromide Monohydrate (Cap) SPIRIVA 18 MCG Inhale  1 Cap by mouth 1 time daily as needed (Shortness of breath).        .                 Medicines prescribed today were sent to:     [facility] - [place], [place] - [address] [facility].    [address] [place] [address]    Phone: [phone] Fax: [phone]    Open 24 Hours?: No      Medication refill instructions:       If your prescription bottle indicates you have medication refills left, it is not necessary to call your provider’s office. Please contact your pharmacy and they will refill your medication.    If your prescription bottle indicates you do not have any refills left, you may request refills at any time through one of the following ways: The online [employer]ine system (except Urgent Care), by calling your provider’s office, or by asking your pharmacy to contact your provider’s office with a refill request. Medication refills are processed only during regular business hours and may not be available until the next business day. Your provider may request additional information or to have a follow-up visit with you prior to refilling your medication.   *Please Note: Medication refills are assigned a new Rx number when refilled electronically. Your pharmacy may indicate that no refills were authorized even though a new prescription for the same medication is available at the pharmacy. Please request the medicine by name with the pharmacy before contacting your provider for a refill.           MyChart Status: Patient Declined        Quit Tobacco Information     Do you want to quit using tobacco?    Quitting tobacco decreases risks of cancer, heart and lung disease, increases life expectancy, improves sense of taste and smell, and increases spending money, among other benefits.    If you are thinking about quitting, we can help.  • [facility] Quit Tobacco Program: 131.964.3932  o Program occurs weekly for four weeks and includes pharmacist consultation on products to support quitting smoking or chewing tobacco. A  provider referral is needed for pharmacist consultation.  • Tobacco Users Help Hotline: 3-674-QUIT-NOW (947-0673) or https://[place].quitlogix.org/  o Free, confidential telephone and online coaching for [place] residents. Sessions are designed on a schedule that is convenient for you. Eligible clients receive free nicotine replacement therapy.  • Nationally: www.smokefree.gov  o Information and professional assistance to support both immediate and long-term needs as you become, and remain, a non-smoker. Smokefree.gov allows you to choose the help that best fits your needs.

## 2017-05-11 NOTE — PROGRESS NOTES
Subjective:      Wagner Kenney is a 52 y.o. male who presents with Follow-Up            HPI Comments: Patient here for follow-up of his recent blood work, patient has a history of chronic kidney disease, diabetes, hyperlipidemia, neuropathy and hypertension.    His recent blood work was improved from his previous. His A1c was at 11.5 down to 13.8 previously. Patient is currently using Lantus at 42 units and NovoLog on a sliding scale before meals. We'll have him increase his his Lantus by 2 units every 2 weeks until his blood sugars start averaging below 180. We'll also have him continue to check his feet daily for any skin breakdown or changes in sensation. Patient also reminded to check in with his ophthalmologist for a diabetic retinal exam.    His recent kidney function his creatinine to be a 1.21. He had an increase in his microalbumin creatinine ratio. We'll increase his lisinopril from 10 mg to 20 mg. And we'll recheck his microalbumin creatinine ratio and approximately 2-3 months. We'll continue to follow.    His blood pressure appears to be well managed with the blood pressure medications he is currently taking. He is not having any chest pain or shortness of breath or any other neurologic changes. We'll have him check his blood pressures at home and keep notes on his numbers. We'll continue to follow.    He recently had been seen at the emergency room at [facility] for an upper respiratory infection which he says has been improving. Will continue to use his inhalers as directed. Will continue to follow.     Current medications, allergies, and problem list reviewed with patient and updated in EPIC.      URI   This is a new problem. The current episode started 1 to 4 weeks ago. The problem has been gradually improving. There has been no fever. Associated symptoms include congestion, coughing, rhinorrhea and a sore throat. Pertinent negatives include no abdominal pain, chest pain, diarrhea, dysuria, ear pain,  "headaches, joint pain, joint swelling, nausea, neck pain, plugged ear sensation, rash, sinus pain, sneezing, swollen glands, vomiting or wheezing. He has tried inhaler use and increased fluids for the symptoms. The treatment provided moderate relief.       Review of Systems   Constitutional: Negative for fever and chills.   HENT: Positive for congestion, rhinorrhea and sore throat. Negative for ear pain, hearing loss and sneezing.    Respiratory: Positive for cough. Negative for sputum production, shortness of breath and wheezing.    Cardiovascular: Negative for chest pain and palpitations.   Gastrointestinal: Negative for nausea, vomiting, abdominal pain and diarrhea.   Genitourinary: Negative for dysuria.   Musculoskeletal: Positive for back pain. Negative for joint pain and neck pain.   Skin: Negative for rash.   Neurological: Positive for tingling. Negative for headaches.          Objective:     Filed Vitals:    05/11/17 1423   BP: 122/78   Pulse: 88   Temp: 36.6 °C (97.9 °F)   Resp: 16   Height: 1.943 m (6' 4.5\")   Weight: 99.791 kg (220 lb)   SpO2: 94%         Physical Exam   HENT:   Right Ear: External ear normal.   Left Ear: External ear normal.   TM slight dull, congested   Eyes: EOM are normal. Pupils are equal, round, and reactive to light.   Cardiovascular: Normal rate, regular rhythm and normal heart sounds.  Exam reveals no friction rub.    No murmur heard.  Pulmonary/Chest: Effort normal and breath sounds normal. No respiratory distress. He has no wheezes. He has no rales.   Abdominal: Soft. Bowel sounds are normal.               Assessment/Plan:     1. Upper respiratory tract infection, unspecified type  His symptoms have been improving. We'll have him continue to use nasal saline and any over-the-counter medication has been using. We'll continue to follow.    2. Chronic kidney disease (CKD), stage III (moderate)  Continue to monitor his kidney function as well as proteinuria. His lisinopril has been " increased to 20 mg. We'll continue to follow.    3. Uncontrolled type 2 diabetes mellitus with diabetic neuropathy, unspecified long term insulin use status (CMS-HCC)  We'll have him continue to increase his Lantus by 2 units every 2 weeks. We'll also have him continue to use his NovoLog as directed. Will have him continue to check his feet daily for skin breakdown or changes in sensation. We'll continue to follow.    4. Hyperlipidemia with target LDL less than 100  We'll have him continue to use his cholesterol medications as directed. He has been advised to increase the fibers in his diet, avoid fatty/fried foods. Also advised to exercise as tolerated.       5. Chronic obstructive pulmonary disease with acute lower respiratory infection (CMS-HCC)  We'll have him continue to use his inhalers as directed. We'll continue to follow.    6. Need for Tdap vaccination  We'll have him get his immunization today. Risks and benefits of immunization were discussed with the patient.  - Tdap =>6yo IM    7. Essential hypertension  We'll have him continue to take his medication as directed. He has been advised to monitor blood pressure at home and keep notes. If blood pressure elevated or having symptoms of CP, SOB or neurologic changes to go to the er.

## 2017-05-23 ENCOUNTER — APPOINTMENT (OUTPATIENT)
Dept: PAIN MANAGEMENT | Facility: REHABILITATION | Age: 52
End: 2017-05-23
Attending: PHYSICAL MEDICINE & REHABILITATION
Payer: MEDICAID

## 2017-06-06 ENCOUNTER — HOSPITAL ENCOUNTER (OUTPATIENT)
Dept: PAIN MANAGEMENT | Facility: REHABILITATION | Age: 52
End: 2017-06-06
Attending: PHYSICAL MEDICINE & REHABILITATION
Payer: MEDICAID

## 2017-06-06 NOTE — H&P
Special Procedures H&P:      Subjective: Mr. Kenney  notes ongoing pain in the cervical region, primarily in the left mid and lower aspect, also notes intermittent radiating pain to the left arm, with neuropathic component. The patient notes prior cervical epidural steroid injection with transient benefit. The patient notes left shoulder area pain, worse with activities. The patient notes prior orthopedic evaluation, recommending continue nonsurgical care.The patient has low back pain, intermittent lower limb radiating pain with neuropathic component. He has had prior conservative treatment trial.    MEDICAL RECORDS REVIEW/DATA REVIEW: Reviewed in epic.    I reviewed medications.    I reviewed diagnostic studies:     I reviewed radiographs. Reviewed MRI cervical spine 6/2016. Reviewed CT cervical spine 9/2016. Reviewed MRI lumbar spine 6/2016. Reviewed MRI left shoulder 5/2010.    I reviewed lab studies. Reviewed labs 4/2017, including CMP, A1C 11.5, improved.     I reviewed medical issues. Follow by primary care provider, comorbid medical issues include diabetes, neuropathy, cardiac, pulmonary, kidney disease.    I reviewed family history: No neuromuscular disorders noted.    I reviewed social issues. On disability    PAST MEDICAL HISTORY:     Past Medical History      Past Medical History     Diagnosis   Date     •   HTN (hypertension)        •   Chronic LBP        •   Chronic shoulder pain        •   DM (diabetes mellitus) (CMS-[facility])              TYPE II,diet controlled     •   COPD (chronic obstructive pulmonary disease) (CMS-[facility])   3/31/10           mild     •   Kidney disease                PAST SURGICAL HISTORY:      Past Surgical History      Past Surgical History     Procedure   Laterality   Date     •   Other orthopedic surgery      knee             ALLERGIES:  Apple; Asa; and Metformin    MEDICATIONS:      Encounter Medications      Outpatient Encounter Prescriptions as of 4/24/2017     Medication   Sig    Dispense   Refill     •   baclofen (LIORESAL) 10 MG Tab   Take 1/2  to 1 tablet by mouth three times per day as needed for muscle spasm   45 Tab   2     •   gabapentin (NEURONTIN) 100 MG Cap   Take 1 to 2 tablets by mouth three times per day as needed for nerve pain   45 Cap   2     •   budesonide-formoterol (SYMBICORT) 160-4.5 MCG/ACT Aerosol   INHALE 2 PUFFS BY MOUTH 2 TIMES A DAY.   1 Inhaler   11     •   tiotropium (SPIRIVA) 18 MCG Cap   Inhale 1 Cap by mouth 1 time daily as needed (Shortness of breath).   90 Cap   0     •   atorvastatin (LIPITOR) 20 MG Tab   Take 1 Tab by mouth every day.   30 Tab   3     •   insulin glargine (LANTUS) 100 UNIT/ML Solution   Inject 42 Units as instructed every day.   10 mL   6     •   ranitidine (ZANTAC) 150 MG Tab   Take 1 Tab by mouth 2 times a day as needed for Heartburn.   60 Tab   3     •   lisinopril (PRINIVIL) 10 MG Tab   Take 1 Tab by mouth every day.   30 Tab   3     •   NOVOLOG, insulin aspart, (NOVOLOG FLEXPEN) 100 UNIT/ML Solution Pen-injector injection   Inject 5-15 Units as instructed 3 times a day before meals.   5 PEN   11     •   albuterol 108 (90 BASE) MCG/ACT Aero Soln inhalation aerosol   Inhale 2 Puffs by mouth every 6 hours as needed for Shortness of Breath.   8.5 g   6     •   amlodipine (NORVASC) 10 MG Tab   Take 1 Tab by mouth every day.   90 Tab   1     •   Misc. Devices Misc   Precision xtra glucometer and test strips and lancets to check his blood sugars 4 times daily #120   120 Device   11     •   atorvastatin (LIPITOR) 10 MG Tab              •   PRECISION XTRA TEST STRIPS strip              •   LANTUS SOLOSTAR 100 UNIT/ML Solution Pen-injector injection   INJECT 40 UNITS AS INSTRUCTED EVERY EVENING.      11     •   NOVOFINE 32G X 6 MM Misc              •   TECHLITE LANCETS Misc              •   ranitidine (ZANTAC) 300 MG tablet              •   Misc. Devices Misc   Diabetic shoes to use as directed for B near ulcerating callouses on plantar surfaces  of feet   2 Device   0     •   [DISCONTINUED] cyclobenzaprine (FLEXERIL) 10 MG Tab   Take 1 Tab by mouth 2 times a day as needed for Mild Pain.   30 Tab   3         No facility-administered encounter medications on file as of 4/24/2017.             SOCIAL HISTORY:      Social History      Social History         Social History     •   Marital Status:   Single           Spouse Name:   N/A     •   Number of Children:   N/A     •   Years of Education:   N/A         Social History Main Topics     •   Smoking status:   Current Every Day Smoker -- 1.00 packs/day for 25 years           Types:   Cigarettes           Last Attempt to Quit:   05/26/2012     •   Smokeless tobacco:   Never Used     •   Alcohol Use:   Yes              Comment: last night 3-4 drinks     •   Drug Use:   No     •   Sexual Activity:   Not Asked         Other Topics   Concern     •    Service   No     •   Blood Transfusions   No     •   Caffeine Concern   No     •   Occupational Exposure   No     •   Hobby Hazards   No     •   Sleep Concern   No           hard to sleep     •   Stress Concern   No     •   Weight Concern   No     •   Special Diet   Yes           diabetic      •   Back Care   Yes     •   Exercise   Yes           little bit     •   Bike Helmet   No           does not ride bike      •   Seat Belt   Yes     •   Self-Exams   No         Social History Narrative             Review of Systems    Constitutional: Negative for fever and chills.    HENT: Negative for hearing loss and tinnitus.     Eyes: Negative for double vision and photophobia.    Respiratory: Negative for hemoptysis and sputum production.     Cardiovascular: Negative for palpitations and orthopnea.    Gastrointestinal: Negative for abdominal pain and diarrhea.    Genitourinary: Negative for urgency and frequency.    Musculoskeletal: Positive for myalgias, back pain, joint pain and neck pain.    Skin: Negative.     Neurological: Positive for tingling and sensory change.  "   Endo/Heme/Allergies: Negative.     All other systems reviewed and are negative.           Objective:       /78 mmHg  Pulse 92  Temp(Src) 36.4 °C (97.5 °F)  Ht 1.943 m (6' 4.5\")  Wt 106.595 kg (235 lb)  BMI 28.24 kg/m2  SpO2 98%     Physical Exam  Constitutional: oriented to person, place, and time, appears well-developed and well-nourished.    HEENT: Normocephalic atraumatic, neck supple, no JVD noted, no masses noted, no meningeal signs noted  Lymphadenopathy: no cervical, supraclavicular, or inguinal lymphadenopathy noted  Cardiovascular: Intact distal pulses, including at wrists and ankles, no limb swelling noted  Pulmonary: No tachypnea noted, no accessory muscle use noted, no dyspnea noted  Abdominal: Soft, nontender, exhibits no distension, no peritoneal signs, no HSM  Musculoskeletal:   Right shoulder: exhibits mild tenderness. Mild pain with range of motion testing  Left shoulder: exhibits  tenderness.  pain with range of motion testing, weakness noted with strength testing, primarily with abduction, impingement signs noted  Right hip: exhibits only mild tenderness. Minimal pain with range of motion testing  Left hip: exhibits only mild tenderness. Minimal pain with range of motion testing  Cervical back: exhibits  decreased range of motion,  tenderness and pain. Spurling's testing produces axial pain on the left, trigger points noted  Lumbar back: exhibits mild decreased range of motion, mild tenderness and mild pain. negative straight leg testing, trigger points noted  Wrist/hand: mild pain with range of motion testing, equivocal tinel's at wrist, negative tinel's at elbows  Neurological: oriented to person, place, and time. Cranial nerves grossly intact, normal strength. Sensation intact distally. Reflexes 1+ in upper and lower limbs, Gait antalgic, reciprocal, No upper motor neuron signs evident  Skin: Skin is intact. no rashes or lesions noted  Psychiatric: normal mood and affect. speech " is normal and behavior is normal. Judgment and thought content normal. Cognition and memory are normal.           Assessment:    1. Cervical spondylosis, suspect cervical facet syndrome  2. Left shoulder impingement syndrome  3. Myofascial pain, lumbosacral region    Plan:    1. Left cervical 5, 6, 7, and thoracic 1 medial branch blocks, facet blocks, without steroid  2. Left shoulder subacromial bursa injection, without steroid  3. Trigger point injections, lumbosacral region, without steroid

## 2017-06-12 ENCOUNTER — OFFICE VISIT (OUTPATIENT)
Dept: MEDICAL GROUP | Facility: MEDICAL CENTER | Age: 52
End: 2017-06-12
Attending: FAMILY MEDICINE
Payer: MEDICAID

## 2017-06-12 VITALS
WEIGHT: 234 LBS | OXYGEN SATURATION: 98 % | BODY MASS INDEX: 28.49 KG/M2 | DIASTOLIC BLOOD PRESSURE: 88 MMHG | SYSTOLIC BLOOD PRESSURE: 142 MMHG | HEART RATE: 100 BPM | HEIGHT: 76 IN | RESPIRATION RATE: 16 BRPM | TEMPERATURE: 97.9 F

## 2017-06-12 DIAGNOSIS — I10 ESSENTIAL HYPERTENSION: ICD-10-CM

## 2017-06-12 DIAGNOSIS — N18.30 CHRONIC KIDNEY DISEASE (CKD), STAGE III (MODERATE) (HCC): ICD-10-CM

## 2017-06-12 DIAGNOSIS — Z79.4 TYPE 2 DIABETES MELLITUS WITH DIABETIC NEUROPATHY, WITH LONG-TERM CURRENT USE OF INSULIN (HCC): ICD-10-CM

## 2017-06-12 DIAGNOSIS — E11.40 TYPE 2 DIABETES MELLITUS WITH DIABETIC NEUROPATHY, WITH LONG-TERM CURRENT USE OF INSULIN (HCC): ICD-10-CM

## 2017-06-12 DIAGNOSIS — E78.5 HYPERLIPIDEMIA WITH TARGET LDL LESS THAN 100: ICD-10-CM

## 2017-06-12 PROCEDURE — 99214 OFFICE O/P EST MOD 30 MIN: CPT | Performed by: FAMILY MEDICINE

## 2017-06-12 RX ORDER — INSULIN GLARGINE 100 [IU]/ML
42 INJECTION, SOLUTION SUBCUTANEOUS EVERY EVENING
Qty: 10 PEN | Refills: 11 | Status: SHIPPED | OUTPATIENT
Start: 2017-06-12 | End: 2017-07-20 | Stop reason: SDUPTHER

## 2017-06-12 RX ORDER — LANCETS
EACH MISCELLANEOUS
Qty: 120 EACH | Refills: 11 | Status: SHIPPED | OUTPATIENT
Start: 2017-06-12 | End: 2017-11-14 | Stop reason: SDUPTHER

## 2017-06-12 RX ORDER — LISINOPRIL 20 MG/1
20 TABLET ORAL DAILY
Qty: 90 TAB | Refills: 3 | Status: SHIPPED | OUTPATIENT
Start: 2017-06-12 | End: 2017-08-24 | Stop reason: SDUPTHER

## 2017-06-12 RX ORDER — ATORVASTATIN CALCIUM 10 MG/1
10 TABLET, FILM COATED ORAL DAILY
Qty: 90 TAB | Refills: 0 | Status: SHIPPED | OUTPATIENT
Start: 2017-06-12 | End: 2017-08-24

## 2017-06-12 RX ORDER — BLOOD SUGAR DIAGNOSTIC
STRIP MISCELLANEOUS
Qty: 120 STRIP | Refills: 11 | Status: SHIPPED | OUTPATIENT
Start: 2017-06-12 | End: 2017-11-14 | Stop reason: SDUPTHER

## 2017-06-12 RX ORDER — LISINOPRIL 20 MG/1
20 TABLET ORAL DAILY
Qty: 30 TAB | Refills: 3 | Status: SHIPPED | OUTPATIENT
Start: 2017-06-12 | End: 2017-06-12 | Stop reason: SDUPTHER

## 2017-06-12 RX ORDER — RANITIDINE 300 MG/1
TABLET ORAL
Qty: 60 TAB | Status: CANCELLED | OUTPATIENT
Start: 2017-06-12

## 2017-06-12 ASSESSMENT — ENCOUNTER SYMPTOMS
ABDOMINAL PAIN: 0
VOMITING: 0
HEADACHES: 0
CHILLS: 0
FEVER: 0
TINGLING: 1
NAUSEA: 0
SHORTNESS OF BREATH: 0
BACK PAIN: 1
PALPITATIONS: 0

## 2017-06-12 NOTE — MR AVS SNAPSHOT
"        Wagner Cardonade   2017 12:50 PM   Office Visit   MRN: [id number]    Department:  [facility]   Dept Phone:  [phone]    Description:  Male : 1965   Provider:  Eleuterio Lara M.D.           Reason for Visit     Diabetes dm at  89    Medication Refill           Allergies as of 2017     Allergen Noted Reactions    Apple 2013   Swelling    Per patient: swelling of mouth and jittery feeling.  Apple allergy to raw apples; apple juice and applesauce okay per patient    Asa [Aspirin] 2009       \"funny taste in my mouth. My stomach has an 'empty' feeling.\"  \"throat closing\"    Metformin 2010       Pt states he \"cramps up\"      You were diagnosed with     Chronic kidney disease (CKD), stage III (moderate)   [327576]       Type 2 diabetes mellitus with diabetic neuropathy, with long-term current use of insulin (CMS-[facility])   [4738133]       Hyperlipidemia with target LDL less than 100   [708628]         Vital Signs     Blood Pressure Pulse Temperature Respirations Height Weight    142/88 mmHg 100 36.6 °C (97.9 °F) 16 1.943 m (6' 4.5\") 106.142 kg (234 lb)    Body Mass Index Oxygen Saturation Smoking Status             28.12 kg/m2 98% Current Every Day Smoker         Basic Information     Date Of Birth Sex Race Ethnicity Preferred Language    1965 Male Black or  Non- English      Your appointments     2017 11:30 AM   Follow Up Visit with Won Atwood M.D.   [facility] (--)    [address] [address] [phone]           You will be receiving a confirmation call a few days before your appointment from our automated call confirmation system.            2017  1:10 PM   Established Patient with Eleuterio Lara M.D.   [facility] (Healthcare Center)    [address] [place] [id number] [phone]           You will be receiving a confirmation call a few days before your appointment " from our automated call confirmation system.              Problem List              ICD-10-CM Priority Class Noted - Resolved    GERD (gastroesophageal reflux disease) K21.9   7/15/2009 - Present    HTN (hypertension) I10 Medium  7/15/2009 - Present    Chronic low back pain M54.5, G89.29   7/15/2009 - Present    COPD (chronic obstructive pulmonary disease) (CMS-HCC) J44.9   3/31/2010 - Present    ANABELA (obstructive sleep apnea) G47.33   4/14/2010 - Present    Right-sided heart failure (CMS-HCC) I50.9   5/17/2010 - Present    Hand pain M79.643   8/30/2010 - Present    Foot pain M79.673   8/30/2010 - Present    Diabetes    4/18/2011 - Present    DIABETES MELLITUS    11/8/2011 - Present    CKD (chronic kidney disease) N18.9   1/13/2012 - Present    HTN (hypertension) I10   1/13/2012 - Present    Hyperlipidemia with target LDL less than 100 E78.5 Low  1/13/2012 - Present    DM (diabetes mellitus) (CMS-HCC) E11.9   7/25/2012 - Present    Chronic kidney disease (CKD), stage III (moderate) N18.3 Medium  7/26/2012 - Present    Renal cyst N28.1   7/26/2012 - Present    Asthma exacerbation J45.901 High  10/16/2012 - Present    DIABETES MELLITUS    10/17/2012 - Present    COPD exacerbation (CMS-HCC) J44.1 Medium  4/4/2013 - Present    Hypertriglyceridemia E78.1   6/13/2013 - Present    Hypercalcemia E83.52   7/30/2013 - Present    Vitamin d deficiency    7/30/2013 - Present    Back pain M54.9   7/17/2015 - Present    DKA (diabetic ketoacidoses) (CMS-HCC) E13.10   7/17/2015 - Present    Tobacco abuse Z72.0 Low  7/17/2015 - Present    Increased anion gap metabolic acidosis E87.2   7/17/2015 - Present    Sepsis (CMS-HCC) A41.9 High  7/22/2015 - Present    CAP (community acquired pneumonia) J18.9 High  7/22/2015 - Present    DM (diabetes mellitus) (CMS-HCC) E11.9 Medium  7/23/2015 - Present    Cocaine use F14.10   9/30/2015 - Present    Hyperlipidemia associated with type 2 diabetes mellitus (CMS-HCC) E11.69, E78.5   10/6/2015 -  Present    Type 2 diabetes mellitus with hyperglycemia (CMS-HCC) E11.65 Medium  2/13/2016 - Present    Type 2 diabetes mellitus with neurologic complication (CMS-HCC) E11.49 Medium  2/13/2016 - Present    Acute on chronic respiratory failure with hypoxia (CMS-HCC) J96.21 High  2/13/2016 - Present    COPD with acute exacerbation (CMS-HCC) J44.1 High  2/13/2016 - Present    Viral sepsis (CMS-HCC) A41.89, B97.89 High  2/13/2016 - Present    Influenza A J10.1 High  2/13/2016 - Present    Osteoarthritis of spine with radiculopathy, cervical region M47.22   9/9/2016 - Present    DDD (degenerative disc disease), cervical M50.30   9/9/2016 - Present    Cervical radiculopathy M54.12   9/9/2016 - Present    Chronic neck pain M54.2, G89.29   9/9/2016 - Present    Type 2 diabetes mellitus with diabetic neuropathy, with long-term current use of insulin (CMS-HCC) E11.40, Z79.4   6/12/2017 - Present      Health Maintenance        Date Due Completion Dates    IMM HEP B VACCINE (1 of 3 - Primary Series) 1965 ---    IMM PNEUMOCOCCAL 19-64 (ADULT) MEDIUM RISK SERIES (1 of 1 - PPSV23) 3/18/1986 ---    COLONOSCOPY 3/18/2017 ---    A1C SCREENING 10/13/2017 4/13/2017, 11/7/2016, 5/25/2016, 2/11/2016, 12/2/2015, 7/17/2015, 2/21/2014, 7/24/2013, 4/4/2013, 10/17/2012, 7/25/2012, 8/3/2011, 10/28/2010, 6/28/2010, 4/20/2010, 1/5/2009, 6/30/2008, 12/10/2006, 10/19/2005    DIABETES MONOFILAMENT / LE EXAM 4/5/2018 4/5/2017, 3/31/2015 (Done), 10/14/2013 (N/S)    Override on 3/31/2015: Done    Override on 10/14/2013: (N/S)    FASTING LIPID PROFILE 4/13/2018 4/13/2017, 11/7/2016, 5/25/2016, 2/12/2016, 12/2/2015, 2/21/2014, 7/24/2013, 7/25/2012, 8/3/2011, 4/20/2010, 6/9/2009, 6/30/2008    URINE ACR / MICROALBUMIN 4/13/2018 4/13/2017, 11/7/2016, 5/25/2016, 12/2/2015, 7/24/2013, 7/25/2012, 10/28/2010    SERUM CREATININE 4/13/2018 4/13/2017, 3/12/2017, 12/9/2016, 11/7/2016, 9/6/2016, 5/25/2016, 2/13/2016, 2/12/2016, 2/11/2016, 12/2/2015, 9/5/2015,  7/26/2015, 7/25/2015, 7/24/2015, 7/22/2015, 7/18/2015, 7/17/2015, 7/17/2015, 2/22/2015, 1/6/2015, 7/15/2014, 7/14/2014, 7/13/2014, 5/3/2014, 11/4/2013, 7/24/2013, 5/27/2013, 4/4/2013, 1/29/2013, 10/18/2012, 10/17/2012, 10/16/2012, 8/21/2012, 7/25/2012, 8/3/2011, 12/22/2010, 4/20/2010, 6/9/2009, 9/29/2008, 6/30/2008, 12/10/2006, 12/9/2006, 10/27/2005, 10/26/2005, 10/25/2005, 10/25/2005, 10/25/2005, 10/18/2005, 10/18/2005    RETINAL SCREENING 4/14/2018 4/14/2017, 4/14/2016    IMM DTaP/Tdap/Td Vaccine (2 - Td) 5/11/2027 5/11/2017            Current Immunizations     Influenza TIV (IM) 10/14/2013, 10/18/2012 10:41 AM, 11/8/2011 10:06 AM    Influenza Vaccine Pediatric 11/9/2010, 12/16/2009    Influenza Vaccine Quad Inj (Pf) 10/22/2014  9:18 AM    Influenza Vaccine Quad Inj (Preserved) 2/13/2016 12:36 PM    Pneumococcal Vaccine (UF)Historical Data 12/23/2010    Tdap Vaccine 5/11/2017  2:31 PM      Below and/or attached are the medications your provider expects you to take. Review all of your home medications and newly ordered medications with your provider and/or pharmacist. Follow medication instructions as directed by your provider and/or pharmacist. Please keep your medication list with you and share with your provider. Update the information when medications are discontinued, doses are changed, or new medications (including over-the-counter products) are added; and carry medication information at all times in the event of emergency situations     Allergies:  APPLE - Swelling     ASA - (reactions not documented)     METFORMIN - (reactions not documented)               Medications  Valid as of: June 12, 2017 -  1:12 PM    Generic Name Brand Name Tablet Size Instructions for use    Albuterol Sulfate (Aero Soln) albuterol 108 (90 BASE) MCG/ACT Inhale 2 Puffs by mouth every 6 hours as needed for Shortness of Breath.        AmLODIPine Besylate (Tab) NORVASC 10 MG Take 1 Tab by mouth every day.        Atorvastatin Calcium  (Tab) LIPITOR 20 MG Take 1 Tab by mouth every day.        Atorvastatin Calcium (Tab) LIPITOR 10 MG Take 1 Tab by mouth every day.        Baclofen (Tab) LIORESAL 10 MG Take 1/2  to 1 tablet by mouth three times per day as needed for muscle spasm        Budesonide-Formoterol Fumarate (Aerosol) SYMBICORT 160-4.5 MCG/ACT INHALE 2 PUFFS BY MOUTH 2 TIMES A DAY.        Gabapentin (Cap) NEURONTIN 100 MG Take 1 to 2 tablets by mouth three times per day as needed for nerve pain        Glucose Blood (Strip) PRECISION XTRA TEST STRIPS  Use as directed        Insulin Aspart (Solution Pen-injector) NOVOLOG 100 UNIT/ML Inject 5-15 Units as instructed 3 times a day before meals.        Insulin Glargine (Solution Pen-injector) LANTUS SOLOSTAR 100 UNIT/ML Inject 42 Units as instructed every evening.        Insulin Pen Needle (Misc) NOVOFINE 32G X 6 MM Use as directed with insulin 4 times daily        Lancets (Misc) TECHLITE LANCETS  Use as directed        Lisinopril (Tab) PRINIVIL 20 MG Take 1 Tab by mouth every day.        Misc. Devices (Misc) Misc. Devices  Precision xtra glucometer and test strips and lancets to check his blood sugars 4 times daily #120        Misc. Devices (Misc) Misc. Devices  Diabetic shoes to use as directed for B near ulcerating callouses on plantar surfaces of feet        RaNITidine HCl (Tab) ZANTAC 150 MG Take 1 Tab by mouth 2 times a day as needed for Heartburn.        RaNITidine HCl (Tab) ZANTAC 300 MG         Tiotropium Bromide Monohydrate (Cap) SPIRIVA 18 MCG Inhale 1 Cap by mouth 1 time daily as needed (Shortness of breath).        .                 Medicines prescribed today were sent to:     [facility] [address]    [address] [place] [id number]    Phone: [phone] Fax: [phone]    Open 24 Hours?: No      Medication refill instructions:       If your prescription bottle indicates you have medication refills left, it is not necessary to call your provider’s  office. Please contact your pharmacy and they will refill your medication.    If your prescription bottle indicates you do not have any refills left, you may request refills at any time through one of the following ways: The online MedPassage system (except Urgent Care), by calling your provider’s office, or by asking your pharmacy to contact your provider’s office with a refill request. Medication refills are processed only during regular business hours and may not be available until the next business day. Your provider may request additional information or to have a follow-up visit with you prior to refilling your medication.   *Please Note: Medication refills are assigned a new Rx number when refilled electronically. Your pharmacy may indicate that no refills were authorized even though a new prescription for the same medication is available at the pharmacy. Please request the medicine by name with the pharmacy before contacting your provider for a refill.        Your To Do List     Future Labs/Procedures Complete By Expires    COMP METABOLIC PANEL  As directed 6/12/2018    HEMOGLOBIN A1C  As directed 6/12/2018    MICROALBUMIN CREAT RATIO URINE  As directed 6/12/2018         MedPassage Status: Patient Declined        Quit Tobacco Information     Do you want to quit using tobacco?    Quitting tobacco decreases risks of cancer, heart and lung disease, increases life expectancy, improves sense of taste and smell, and increases spending money, among other benefits.    If you are thinking about quitting, we can help.  • [facility] Quit Tobacco Program: [phone]  o Program occurs weekly for four weeks and includes pharmacist consultation on products to support quitting smoking or chewing tobacco. A provider referral is needed for pharmacist consultation.  • Tobacco Users Help Hotline: 7-800-QUIT-NOW (313-1750) or https://[place].quitlogix.org/  o Free, confidential telephone and online coaching for [place] residents. Sessions  are designed on a schedule that is convenient for you. Eligible clients receive free nicotine replacement therapy.  • Nationally: www.smokefree.gov  o Information and professional assistance to support both immediate and long-term needs as you become, and remain, a non-smoker. Smokefree.gov allows you to choose the help that best fits your needs.

## 2017-06-12 NOTE — PROGRESS NOTES
Subjective:      Wagner Kenney is a 52 y.o. male who presents with No chief complaint on file.            HPI Comments: Patient here for follow-up of his diabetes, hypertension, hyperlipidemia and chronic kidney disease.    He is currently using insulin to treat his diabetes. His blood sugar was slightly higher today due to using a lot of sugar in his coffee. He states he has been checking his blood sugars on a daily basis but forgot to bring in his log today. We'll recheck his A1c, microalbumin creatinine ratio as well as his complete metabolic panel. Will continue to increase his Lantus by 2 units every 2 weeks until his blood sugars average below 180. He will also contact his endocrinologist to schedule an appointment followed up. He'll also check his feet on a daily basis looking for any changes in sensation or skin breakdown. Will continue to follow.     His blood pressure was slightly elevated today. Continues to take his medication as directed. We'll also have him check his blood pressures at home they continue to remain elevated will increase his medication at his next appointment. ER precautions were also given to patient. If he should develop any chest pain, shortness of breath or other neurologic changes.    We'll continue to take his cholesterol medication as directed. Will refill his medication today. He is not having any darkening of his urine or muscle aches or pains. Also discussed avoiding fatty and fried foods and increasing the fiber is in his diet. Also recommend exercise as tolerated.    Will have him continue to follow up with nephrology for his CKD. Reviewed the results of his recent blood work and urine. Will have him continue to use his medications as directed. Will continue to follow.     Current medications, allergies, and problem list reviewed with patient and updated in EPIC.        Review of Systems   Constitutional: Negative for fever and chills.   HENT: Negative for hearing loss.   "  Respiratory: Negative for shortness of breath.    Cardiovascular: Negative for chest pain and palpitations.   Gastrointestinal: Negative for nausea, vomiting and abdominal pain.   Musculoskeletal: Positive for back pain and joint pain.   Neurological: Positive for tingling. Negative for headaches.          Objective:     Filed Vitals:    06/12/17 1300   BP: 142/88   Pulse: 100   Temp: 36.6 °C (97.9 °F)   Resp: 16   Height: 1.943 m (6' 4.5\")   Weight: 106.142 kg (234 lb)   SpO2: 98%         Physical Exam   HENT:   Right Ear: External ear normal.   Left Ear: External ear normal.   Nose: Nose normal.   Mouth/Throat: Oropharynx is clear and moist.   Eyes: EOM are normal. Pupils are equal, round, and reactive to light.   Cardiovascular: Normal rate, regular rhythm and normal heart sounds.  Exam reveals no friction rub.    No murmur heard.  Pulmonary/Chest: Effort normal and breath sounds normal. No respiratory distress. He has no wheezes. He has no rales.   Abdominal: Soft. Bowel sounds are normal.   Musculoskeletal:   Decreased ROM of affected extremities                Assessment/Plan:     1. Chronic kidney disease (CKD), stage III (moderate)  Will have him continue to follow up with nephrology. Discussed the results of his recent lab work. Will continue to follow.  - COMP METABOLIC PANEL; Future  - HEMOGLOBIN A1C; Future  - MICROALBUMIN CREAT RATIO URINE; Future    2. Type 2 diabetes mellitus with diabetic neuropathy, with long-term current use of insulin (CMS-[facility])  We'll have patient continue to use his insulin as directed. Will increase his Lantus by 2 units every 2 weeks until his blood sugars average below 180. In A1c, microalbumin creatinine ratio and metabolic panel have been reordered. We'll also have him continue to check his feet daily for any skin breakdown or changes in sensation.  - COMP METABOLIC PANEL; Future  - HEMOGLOBIN A1C; Future  - MICROALBUMIN CREAT RATIO URINE; Future    3. Hyperlipidemia with " target LDL less than 100  We'll have him continue to use his cholesterol medication as directed. Will refill medication today. He has been advised to increase the fibers in his diet, avoid fatty/fried foods. Also advised to exercise as tolerated.       4. Essential hypertension  We'll have him continue to take his medication as directed. Will refill his medication today. He has been advised to monitor blood pressure at home and keep notes. If blood pressure elevated or having symptoms of CP, SOB or neurologic changes to go to the er.

## 2017-06-20 ENCOUNTER — HOSPITAL ENCOUNTER (OUTPATIENT)
Dept: PAIN MANAGEMENT | Facility: REHABILITATION | Age: 52
End: 2017-06-20
Attending: PHYSICAL MEDICINE & REHABILITATION
Payer: MEDICAID

## 2017-06-20 ENCOUNTER — HOSPITAL ENCOUNTER (OUTPATIENT)
Dept: RADIOLOGY | Facility: REHABILITATION | Age: 52
End: 2017-06-20
Attending: PHYSICAL MEDICINE & REHABILITATION

## 2017-06-20 VITALS
RESPIRATION RATE: 18 BRPM | BODY MASS INDEX: 26.73 KG/M2 | OXYGEN SATURATION: 96 % | TEMPERATURE: 98.8 F | HEIGHT: 77 IN | WEIGHT: 226.41 LBS | DIASTOLIC BLOOD PRESSURE: 90 MMHG | SYSTOLIC BLOOD PRESSURE: 158 MMHG | HEART RATE: 88 BPM

## 2017-06-20 PROCEDURE — 700117 HCHG RX CONTRAST REV CODE 255

## 2017-06-20 PROCEDURE — 64490 INJ PARAVERT F JNT C/T 1 LEV: CPT

## 2017-06-20 PROCEDURE — 700111 HCHG RX REV CODE 636 W/ 250 OVERRIDE (IP)

## 2017-06-20 PROCEDURE — 64492 INJ PARAVERT F JNT C/T 3 LEV: CPT

## 2017-06-20 PROCEDURE — A9579 GAD-BASE MR CONTRAST NOS,1ML: HCPCS

## 2017-06-20 PROCEDURE — 64491 INJ PARAVERT F JNT C/T 2 LEV: CPT

## 2017-06-20 RX ORDER — LIDOCAINE HYDROCHLORIDE 10 MG/ML
INJECTION, SOLUTION EPIDURAL; INFILTRATION; INTRACAUDAL; PERINEURAL
Status: COMPLETED
Start: 2017-06-20 | End: 2017-06-20

## 2017-06-20 RX ORDER — BUPIVACAINE HYDROCHLORIDE 2.5 MG/ML
INJECTION, SOLUTION EPIDURAL; INFILTRATION; INTRACAUDAL
Status: COMPLETED
Start: 2017-06-20 | End: 2017-06-20

## 2017-06-20 RX ORDER — MIDAZOLAM HYDROCHLORIDE 1 MG/ML
INJECTION INTRAMUSCULAR; INTRAVENOUS
Status: COMPLETED
Start: 2017-06-20 | End: 2017-06-20

## 2017-06-20 RX ADMIN — LIDOCAINE HYDROCHLORIDE 7 ML: 10 INJECTION, SOLUTION EPIDURAL; INFILTRATION; INTRACAUDAL; PERINEURAL at 15:10

## 2017-06-20 RX ADMIN — GADODIAMIDE 3 ML: 287 INJECTION INTRAVENOUS at 15:11

## 2017-06-20 RX ADMIN — BUPIVACAINE HYDROCHLORIDE 2 ML: 2.5 INJECTION, SOLUTION EPIDURAL; INFILTRATION; INTRACAUDAL; PERINEURAL at 15:14

## 2017-06-20 ASSESSMENT — PAIN SCALES - GENERAL
PAINLEVEL_OUTOF10: 8
PAINLEVEL_OUTOF10: 2

## 2017-06-20 NOTE — PROGRESS NOTES
Patient positioned by RN  CST  X- ray Tech.  Ankle supported on pillow. During timeout Dr. Atwood will do Cervical C5,6,7 and thoracic 1  medial branch blocks ( facet blocks) because patient opted to hold on shoulder and trigger point injection.

## 2017-06-20 NOTE — H&P
Special Procedures H&P:      Subjective: Mr. Kenney  notes ongoing pain, moderately-severe in the cervical region, primarily in the left mid-lower aspect, constant, also notes intermittent radiating pain to the left arm, with neuropathic component. The patient notes the axial neck pain is most functionally limiting, has had the pain for greater than 3 months. The patient notes prior cervical epidural steroid injection with transient benefit.     The patient notes left shoulder area pain, worse with activities. The patient notes prior orthopedic evaluation, recommending continue nonsurgical care.    The patient has low back pain, intermittent lower limb radiating pain with neuropathic component.     He has had prior conservative treatment trial, including medications, physical therapy, home exercise program, and time. Unfortunately, the ongoing pain limits his ability to function    MEDICAL RECORDS REVIEW/DATA REVIEW: Reviewed in epic.    I reviewed medications.    I reviewed diagnostic studies:     I reviewed radiographs. Reviewed MRI cervical spine 6/2016. Reviewed CT cervical spine 9/2016. Reviewed MRI lumbar spine 6/2016. Reviewed MRI left shoulder 5/2010.    I reviewed lab studies. Reviewed labs 4/2017, including CMP, A1C 11.5, improved.     I reviewed medical issues. Follow by primary care provider, comorbid medical issues include diabetes, neuropathy, cardiac, pulmonary, kidney disease.    I reviewed family history: No neuromuscular disorders noted.    I reviewed social issues. On disability    PAST MEDICAL HISTORY:     Past Medical History      Past Medical History     Diagnosis   Date     •   HTN (hypertension)        •   Chronic LBP        •   Chronic shoulder pain        •   DM (diabetes mellitus) (CMS-HCC)              TYPE II,diet controlled     •   COPD (chronic obstructive pulmonary disease) (CMS-HCC)   3/31/10           mild     •   Kidney disease                PAST SURGICAL HISTORY:      Past  Surgical History      Past Surgical History     Procedure   Laterality   Date     •   Other orthopedic surgery      knee             ALLERGIES:  Apple; Asa; and Metformin    MEDICATIONS:      Encounter Medications      Outpatient Encounter Prescriptions as of 4/24/2017     Medication   Sig   Dispense   Refill     •   baclofen (LIORESAL) 10 MG Tab   Take 1/2  to 1 tablet by mouth three times per day as needed for muscle spasm   45 Tab   2     •   gabapentin (NEURONTIN) 100 MG Cap   Take 1 to 2 tablets by mouth three times per day as needed for nerve pain   45 Cap   2     •   budesonide-formoterol (SYMBICORT) 160-4.5 MCG/ACT Aerosol   INHALE 2 PUFFS BY MOUTH 2 TIMES A DAY.   1 Inhaler   11     •   tiotropium (SPIRIVA) 18 MCG Cap   Inhale 1 Cap by mouth 1 time daily as needed (Shortness of breath).   90 Cap   0     •   atorvastatin (LIPITOR) 20 MG Tab   Take 1 Tab by mouth every day.   30 Tab   3     •   insulin glargine (LANTUS) 100 UNIT/ML Solution   Inject 42 Units as instructed every day.   10 mL   6     •   ranitidine (ZANTAC) 150 MG Tab   Take 1 Tab by mouth 2 times a day as needed for Heartburn.   60 Tab   3     •   lisinopril (PRINIVIL) 10 MG Tab   Take 1 Tab by mouth every day.   30 Tab   3     •   NOVOLOG, insulin aspart, (NOVOLOG FLEXPEN) 100 UNIT/ML Solution Pen-injector injection   Inject 5-15 Units as instructed 3 times a day before meals.   5 PEN   11     •   albuterol 108 (90 BASE) MCG/ACT Aero Soln inhalation aerosol   Inhale 2 Puffs by mouth every 6 hours as needed for Shortness of Breath.   8.5 g   6     •   amlodipine (NORVASC) 10 MG Tab   Take 1 Tab by mouth every day.   90 Tab   1     •   Misc. Devices Misc   Precision xtra glucometer and test strips and lancets to check his blood sugars 4 times daily #120   120 Device   11     •   atorvastatin (LIPITOR) 10 MG Tab              •   PRECISION XTRA TEST STRIPS strip              •   LANTUS SOLOSTAR 100 UNIT/ML Solution Pen-injector injection   INJECT  40 UNITS AS INSTRUCTED EVERY EVENING.      11     •   NOVOFINE 32G X 6 MM Misc              •   TECHLITE LANCETS Misc              •   ranitidine (ZANTAC) 300 MG tablet              •   Misc. Devices Misc   Diabetic shoes to use as directed for B near ulcerating callouses on plantar surfaces of feet   2 Device   0     •   [DISCONTINUED] cyclobenzaprine (FLEXERIL) 10 MG Tab   Take 1 Tab by mouth 2 times a day as needed for Mild Pain.   30 Tab   3         No facility-administered encounter medications on file as of 4/24/2017.             SOCIAL HISTORY:      Social History      Social History         Social History     •   Marital Status:   Single           Spouse Name:   N/A     •   Number of Children:   N/A     •   Years of Education:   N/A         Social History Main Topics     •   Smoking status:   Current Every Day Smoker -- 1.00 packs/day for 25 years           Types:   Cigarettes           Last Attempt to Quit:   05/26/2012     •   Smokeless tobacco:   Never Used     •   Alcohol Use:   Yes              Comment: last night 3-4 drinks     •   Drug Use:   No     •   Sexual Activity:   Not Asked         Other Topics   Concern     •    Service   No     •   Blood Transfusions   No     •   Caffeine Concern   No     •   Occupational Exposure   No     •   Hobby Hazards   No     •   Sleep Concern   No           hard to sleep     •   Stress Concern   No     •   Weight Concern   No     •   Special Diet   Yes           diabetic      •   Back Care   Yes     •   Exercise   Yes           little bit     •   Bike Helmet   No           does not ride bike      •   Seat Belt   Yes     •   Self-Exams   No         Social History Narrative             Review of Systems    Constitutional: Negative for fever and chills.    HENT: Negative for hearing loss and tinnitus.     Eyes: Negative for double vision and photophobia.    Respiratory: Negative for hemoptysis and sputum production.     Cardiovascular: Negative for palpitations  "and orthopnea.    Gastrointestinal: Negative for abdominal pain and diarrhea.    Genitourinary: Negative for urgency and frequency.    Musculoskeletal: Positive for myalgias, back pain, joint pain and neck pain.    Skin: Negative.     Neurological: Positive for tingling and sensory change.    Endo/Heme/Allergies: Negative.     All other systems reviewed and are negative.           Objective:       /78 mmHg  Pulse 92  Temp(Src) 36.4 °C (97.5 °F)  Ht 1.943 m (6' 4.5\")  Wt 106.595 kg (235 lb)  BMI 28.24 kg/m2  SpO2 98%     Physical Exam  Constitutional: oriented to person, place, and time, appears well-developed and well-nourished.    HEENT: Normocephalic atraumatic, neck supple, no JVD noted, no masses noted, no meningeal signs noted  Lymphadenopathy: no cervical, supraclavicular, or inguinal lymphadenopathy noted  Cardiovascular: Intact distal pulses, including at wrists and ankles, no limb swelling noted  Pulmonary: No tachypnea noted, no accessory muscle use noted, no dyspnea noted  Abdominal: Soft, nontender, exhibits no distension, no peritoneal signs, no HSM  Musculoskeletal:   Right shoulder: exhibits mild tenderness. Mild pain with range of motion testing  Left shoulder: exhibits  tenderness.  pain with range of motion testing, weakness noted with strength testing, primarily with abduction, impingement signs noted  Right hip: exhibits only mild tenderness. Minimal pain with range of motion testing  Left hip: exhibits only mild tenderness. Minimal pain with range of motion testing  Cervical back: exhibits  decreased range of motion,  tenderness and pain. Spurling's testing produces axial pain on the left, trigger points noted  Lumbar back: exhibits mild decreased range of motion, mild tenderness and mild pain. negative straight leg testing, trigger points noted  Wrist/hand: mild pain with range of motion testing, equivocal tinel's at wrist, negative tinel's at elbows  Neurological: oriented to " person, place, and time. Cranial nerves grossly intact, normal strength. Sensation intact distally. Reflexes 1+ in upper and lower limbs, Gait antalgic, reciprocal, No upper motor neuron signs evident  Skin: Skin is intact. no rashes or lesions noted  Psychiatric: normal mood and affect. speech is normal and behavior is normal. Judgment and thought content normal. Cognition and memory are normal.           Assessment:    1. Cervical spondylosis, suspect cervical facet syndrome  2. Left shoulder impingement syndrome  3. Myofascial pain, lumbosacral region    Plan:    1. Diagnostic left cervical 5, 6, 7, and thoracic 1 medial branch blocks, facet blocks, without steroid  2. Left shoulder subacromial bursa injection, without steroid  3. Trigger point injections, lumbosacral region, without steroid

## 2017-06-20 NOTE — PROGRESS NOTES
Current medications reviewed with pt, see medications reconciliation form. Pt shana taking ASA or other blood thinners or anti-inflammatories.  Pt has a ride post-procedure(mother to drive).  Printed and verbal discharge instructions given to pt who verbalized understanding.

## 2017-06-21 ENCOUNTER — TELEPHONE (OUTPATIENT)
Dept: PHYSICAL MEDICINE AND REHAB | Facility: MEDICAL CENTER | Age: 52
End: 2017-06-21

## 2017-06-21 DIAGNOSIS — M79.18 MYOFASCIAL PAIN: ICD-10-CM

## 2017-06-21 DIAGNOSIS — M75.42 IMPINGEMENT SYNDROME OF LEFT SHOULDER: ICD-10-CM

## 2017-06-21 NOTE — TELEPHONE ENCOUNTER
"Dr. Atwood wrote, \"The patient underwent cervical facet blocks yesterday, did not want to do additional injections we had discussed, would prefer to be done in office visit.     Submit authorization request for left shoulder subacromial bursa injection and lumbosacral trigger point injections, to be performed in office\"    I let Wagner know above. I let him know injections could be done at fv appt 7/5/17.     He said he is fine from procedure and will keep fv appt. I let him know if he would be on antibiotics or running a fever the injection appt would need to be rescheduled.   "

## 2017-06-21 NOTE — PROCEDURES
DATE OF SERVICE:  06/20/2017    DIAGNOSES:  Cervical spondylosis, suspect cervical facet syndrome.    PROCEDURE:  Diagnostic left cervical 5, 6, 7, thoracic 1 medial branch blocks   (facet blocks).    PROCEDURE NOTE:  Informed consent was obtained.  Risks, benefits and   alternatives discussed and all questions answered.  The patient was placed   prone on the fluoroscopy table.  The skin area from the occiput to T4 was prepped   in a sterile manner with povidone-iodine.  The patient was monitored   throughout the procedure.    Using fluoroscopy with the patient in prone position, the concavities of the   vertebra at the left cervical 5, 6, and 7 were identified as well as the   transverse process of T1.  Those areas were then locally anesthetized with 2   mL of 1% preservative-free lidocaine.    Using fluoroscopy, 25-gauge spinal needle were inserted into cavities of   cervical 5, 6, and 7 as well as over the transverse process just lateral to   the pedicle at T1, confirmed on multiplanar imaging.  Next, 0.5 mL of Omniscan   was injected to verify location and ensure nonvascular flow.  Then, following   negative aspiration, 1 mL of a mixture of 2 mL of 1% preservative-free   lidocaine, and 2 mL of 0.25% preservative-free bupivacaine was injected at   each site.  A total of 4 spinal needles were used for the medial branch   blocks.  There were no complications.    The patient was transported to recovery.  The patient noted preprocedure pain   of 8/10 and post-procedure pain of 2/10.  Postinjection instructions were   reviewed with the patient.  Then, the patient was discharged to home in the   care of a friend and/or family member.       ____________________________________     MD CATINA MELGAR / DAIANA    DD:  06/21/2017 09:01:02  DT:  06/21/2017 09:38:23    D#:  [id number]  Job#:  [id number]

## 2017-06-21 NOTE — PROGRESS NOTES
The patient underwent cervical facet blocks yesterday, did not want to do additional injections we had discussed, would prefer to be done in office visit. Submit authorization request for left shoulder subacromial bursa injection and lumbosacral trigger point injections, to be performed in office.

## 2017-07-03 RX ORDER — RANITIDINE 300 MG/1
TABLET ORAL
Qty: 180 TAB | Refills: 0 | Status: SHIPPED | OUTPATIENT
Start: 2017-07-03 | End: 2017-08-24

## 2017-07-03 NOTE — TELEPHONE ENCOUNTER
Was the patient seen in the last year in this department? Yes     Does patient have an active prescription for medications requested? No     Received Request Via: Pharmacy   Future Appointments       Provider [facility]    7/5/2017 11:30 AM Won Atwood M.D. [facility]     7/20/2017 1:10 PM Eleuterio Lara M.D. [facility]

## 2017-07-05 ENCOUNTER — APPOINTMENT (OUTPATIENT)
Dept: PHYSICAL MEDICINE AND REHAB | Facility: MEDICAL CENTER | Age: 52
End: 2017-07-05

## 2017-07-10 ENCOUNTER — HOSPITAL ENCOUNTER (OUTPATIENT)
Dept: LAB | Facility: MEDICAL CENTER | Age: 52
End: 2017-07-10
Attending: FAMILY MEDICINE
Payer: MEDICAID

## 2017-07-10 DIAGNOSIS — E11.40 TYPE 2 DIABETES MELLITUS WITH DIABETIC NEUROPATHY, WITH LONG-TERM CURRENT USE OF INSULIN (HCC): ICD-10-CM

## 2017-07-10 DIAGNOSIS — Z79.4 TYPE 2 DIABETES MELLITUS WITH DIABETIC NEUROPATHY, WITH LONG-TERM CURRENT USE OF INSULIN (HCC): ICD-10-CM

## 2017-07-10 DIAGNOSIS — N18.30 CHRONIC KIDNEY DISEASE (CKD), STAGE III (MODERATE) (HCC): ICD-10-CM

## 2017-07-10 LAB
ALBUMIN SERPL BCP-MCNC: 4.2 G/DL (ref 3.2–4.9)
ALBUMIN/GLOB SERPL: 1.6 G/DL
ALP SERPL-CCNC: 77 U/L (ref 30–99)
ALT SERPL-CCNC: 31 U/L (ref 2–50)
ANION GAP SERPL CALC-SCNC: 8 MMOL/L (ref 0–11.9)
AST SERPL-CCNC: 24 U/L (ref 12–45)
BILIRUB SERPL-MCNC: 0.6 MG/DL (ref 0.1–1.5)
BUN SERPL-MCNC: 17 MG/DL (ref 8–22)
CALCIUM SERPL-MCNC: 9.1 MG/DL (ref 8.5–10.5)
CHLORIDE SERPL-SCNC: 103 MMOL/L (ref 96–112)
CO2 SERPL-SCNC: 22 MMOL/L (ref 20–33)
CREAT SERPL-MCNC: 1.36 MG/DL (ref 0.5–1.4)
CREAT UR-MCNC: 353.3 MG/DL
EST. AVERAGE GLUCOSE BLD GHB EST-MCNC: 318 MG/DL
GFR SERPL CREATININE-BSD FRML MDRD: 55 ML/MIN/1.73 M 2
GLOBULIN SER CALC-MCNC: 2.6 G/DL (ref 1.9–3.5)
GLUCOSE SERPL-MCNC: 324 MG/DL (ref 65–99)
HBA1C MFR BLD: 12.7 % (ref 0–5.6)
MICROALBUMIN UR-MCNC: 138.4 MG/DL
MICROALBUMIN/CREAT UR: 392 MG/G (ref 0–30)
POTASSIUM SERPL-SCNC: 4.4 MMOL/L (ref 3.6–5.5)
PROT SERPL-MCNC: 6.8 G/DL (ref 6–8.2)
SODIUM SERPL-SCNC: 133 MMOL/L (ref 135–145)

## 2017-07-10 PROCEDURE — 82570 ASSAY OF URINE CREATININE: CPT

## 2017-07-10 PROCEDURE — 82043 UR ALBUMIN QUANTITATIVE: CPT

## 2017-07-10 PROCEDURE — 36415 COLL VENOUS BLD VENIPUNCTURE: CPT

## 2017-07-10 PROCEDURE — 83036 HEMOGLOBIN GLYCOSYLATED A1C: CPT

## 2017-07-10 PROCEDURE — 80053 COMPREHEN METABOLIC PANEL: CPT

## 2017-07-19 RX ORDER — TIOTROPIUM BROMIDE 18 UG/1
CAPSULE ORAL; RESPIRATORY (INHALATION)
Qty: 90 CAP | Refills: 0 | Status: SHIPPED | OUTPATIENT
Start: 2017-07-19 | End: 2017-08-24 | Stop reason: SDUPTHER

## 2017-07-20 ENCOUNTER — OFFICE VISIT (OUTPATIENT)
Dept: MEDICAL GROUP | Facility: MEDICAL CENTER | Age: 52
End: 2017-07-20
Attending: FAMILY MEDICINE
Payer: MEDICAID

## 2017-07-20 VITALS
HEART RATE: 100 BPM | TEMPERATURE: 97.6 F | HEIGHT: 76 IN | BODY MASS INDEX: 27.52 KG/M2 | DIASTOLIC BLOOD PRESSURE: 92 MMHG | WEIGHT: 226 LBS | SYSTOLIC BLOOD PRESSURE: 128 MMHG | RESPIRATION RATE: 12 BRPM | OXYGEN SATURATION: 97 %

## 2017-07-20 DIAGNOSIS — J44.9 CHRONIC OBSTRUCTIVE PULMONARY DISEASE, UNSPECIFIED COPD TYPE (HCC): ICD-10-CM

## 2017-07-20 DIAGNOSIS — E08.41 DIABETIC MONONEUROPATHY ASSOCIATED WITH DIABETES MELLITUS DUE TO UNDERLYING CONDITION (HCC): ICD-10-CM

## 2017-07-20 DIAGNOSIS — E08.21 DIABETES MELLITUS DUE TO UNDERLYING CONDITION WITH DIABETIC NEPHROPATHY, WITH LONG-TERM CURRENT USE OF INSULIN (HCC): ICD-10-CM

## 2017-07-20 DIAGNOSIS — Z79.4 DIABETES MELLITUS DUE TO UNDERLYING CONDITION WITH DIABETIC NEPHROPATHY, WITH LONG-TERM CURRENT USE OF INSULIN (HCC): ICD-10-CM

## 2017-07-20 LAB
HBA1C MFR BLD: 12.7 % (ref ?–5.8)
INT CON NEG: NORMAL
INT CON POS: NORMAL

## 2017-07-20 PROCEDURE — 83036 HEMOGLOBIN GLYCOSYLATED A1C: CPT | Performed by: FAMILY MEDICINE

## 2017-07-20 PROCEDURE — 99214 OFFICE O/P EST MOD 30 MIN: CPT | Performed by: FAMILY MEDICINE

## 2017-07-20 RX ORDER — BUDESONIDE AND FORMOTEROL FUMARATE DIHYDRATE 160; 4.5 UG/1; UG/1
AEROSOL RESPIRATORY (INHALATION)
Qty: 1 INHALER | Refills: 11 | Status: SHIPPED | OUTPATIENT
Start: 2017-07-20 | End: 2017-11-14 | Stop reason: SDUPTHER

## 2017-07-20 RX ORDER — INSULIN GLARGINE 100 [IU]/ML
46 INJECTION, SOLUTION SUBCUTANEOUS EVERY EVENING
Qty: 10 PEN | Refills: 11 | Status: SHIPPED | OUTPATIENT
Start: 2017-07-20 | End: 2017-11-14 | Stop reason: SDUPTHER

## 2017-07-20 ASSESSMENT — ENCOUNTER SYMPTOMS
EYE PAIN: 0
CHILLS: 0
TINGLING: 1
DOUBLE VISION: 0
EYES NEGATIVE: 1
NAUSEA: 0
ABDOMINAL PAIN: 0
SHORTNESS OF BREATH: 0
COUGH: 1
SPUTUM PRODUCTION: 0
VOMITING: 0
PHOTOPHOBIA: 0
BACK PAIN: 1
FEVER: 0
PALPITATIONS: 0
HEADACHES: 0
BLURRED VISION: 0

## 2017-07-20 NOTE — MR AVS SNAPSHOT
"        Wagner Pablito   2017 1:10 PM   Office Visit   MRN: [id number]    Department[facility]   Dept Phone:  [phone]    Description:  Male : 1965   Provider:  Eleuterio Lara M.D.           Reason for Visit     Results           Allergies as of 2017     Allergen Noted Reactions    Apple 2013   Swelling    Per patient: swelling of mouth and jittery feeling.  Apple allergy to raw apples; apple juice and applesauce okay per patient    Asa [Aspirin] 2009       \"funny taste in my mouth. My stomach has an 'empty' feeling.\"  \"throat closing\"    Metformin 2010       Pt states he \"cramps up\"      You were diagnosed with     Diabetes 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, with long-term current use of insulin (CMS-HCC)   [3105754]       Diabetic mononeuropathy associated with diabetes mellitus due to underlying condition (CMS-HCC)   [0172794]         Vital Signs     Blood Pressure Pulse Temperature Respirations Height Weight    128/92 mmHg 100 36.4 °C (97.6 °F) 12 1.943 m (6' 4.5\") 102.513 kg (226 lb)    Body Mass Index Oxygen Saturation Smoking Status             27.15 kg/m2 97% Current Every Day Smoker         Basic Information     Date Of Birth Sex Race Ethnicity Preferred Language    1965 Male Black or  Non- English      Your appointments     2017  1:30 PM   Follow Up Visit with Won Atwood M.D.   [facility] PHYSI[facility] (--)    [address] [address] [phone]           You will be receiving a confirmation call a few days before your appointment from our automated call confirmation system.            Aug 24, 2017 12:50 PM   Established Patient with Eleuterio Lara M.D.   [facility] ([facility])    [address] [place] [id number] [phone]           You will be receiving a confirmation call a few days before your appointment from our automated call " confirmation system.              Problem List              ICD-10-CM Priority Class Noted - Resolved    GERD (gastroesophageal reflux disease) K21.9   7/15/2009 - Present    HTN (hypertension) I10 Medium  7/15/2009 - Present    Chronic low back pain M54.5, G89.29   7/15/2009 - Present    COPD (chronic obstructive pulmonary disease) (CMS-HCC) J44.9   3/31/2010 - Present    ANABELA (obstructive sleep apnea) G47.33   4/14/2010 - Present    Right-sided heart failure (CMS-HCC) I50.9   5/17/2010 - Present    Hand pain M79.643   8/30/2010 - Present    Foot pain M79.673   8/30/2010 - Present    Diabetes    4/18/2011 - Present    DIABETES MELLITUS    11/8/2011 - Present    CKD (chronic kidney disease) N18.9   1/13/2012 - Present    HTN (hypertension) I10   1/13/2012 - Present    Hyperlipidemia with target LDL less than 100 E78.5 Low  1/13/2012 - Present    DM (diabetes mellitus) (CMS-HCC) E11.9   7/25/2012 - Present    Chronic kidney disease (CKD), stage III (moderate) N18.3 Medium  7/26/2012 - Present    Renal cyst N28.1   7/26/2012 - Present    Asthma exacerbation J45.901 High  10/16/2012 - Present    DIABETES MELLITUS    10/17/2012 - Present    COPD exacerbation (CMS-HCC) J44.1 Medium  4/4/2013 - Present    Hypertriglyceridemia E78.1   6/13/2013 - Present    Hypercalcemia E83.52   7/30/2013 - Present    Vitamin d deficiency    7/30/2013 - Present    Back pain M54.9   7/17/2015 - Present    DKA (diabetic ketoacidoses) (CMS-HCC) E13.10   7/17/2015 - Present    Tobacco abuse Z72.0 Low  7/17/2015 - Present    Increased anion gap metabolic acidosis E87.2   7/17/2015 - Present    Sepsis (CMS-HCC) A41.9 High  7/22/2015 - Present    CAP (community acquired pneumonia) J18.9 High  7/22/2015 - Present    DM (diabetes mellitus) (CMS-HCC) E11.9 Medium  7/23/2015 - Present    Cocaine use F14.10   9/30/2015 - Present    Hyperlipidemia associated with type 2 diabetes mellitus (CMS-HCC) E11.69, E78.5   10/6/2015 - Present    Type 2 diabetes  mellitus with hyperglycemia (CMS-HCC) E11.65 Medium  2/13/2016 - Present    Type 2 diabetes mellitus with neurologic complication (CMS-HCC) E11.49 Medium  2/13/2016 - Present    Acute on chronic respiratory failure with hypoxia (CMS-HCC) J96.21 High  2/13/2016 - Present    COPD with acute exacerbation (CMS-HCC) J44.1 High  2/13/2016 - Present    Viral sepsis (CMS-HCC) A41.89, B97.89 High  2/13/2016 - Present    Influenza A J10.1 High  2/13/2016 - Present    Osteoarthritis of spine with radiculopathy, cervical region M47.22   9/9/2016 - Present    DDD (degenerative disc disease), cervical M50.30   9/9/2016 - Present    Cervical radiculopathy M54.12   9/9/2016 - Present    Chronic neck pain M54.2, G89.29   9/9/2016 - Present    Type 2 diabetes mellitus with diabetic neuropathy, with long-term current use of insulin (CMS-HCC) E11.40, Z79.4   6/12/2017 - Present      Health Maintenance        Date Due Completion Dates    IMM HEP B VACCINE (1 of 3 - Primary Series) 1965 ---    IMM PNEUMOCOCCAL 19-64 (ADULT) MEDIUM RISK SERIES (1 of 1 - PPSV23) 3/18/1986 ---    COLONOSCOPY 3/18/2017 ---    IMM INFLUENZA (1) 9/1/2017 2/13/2016, 10/22/2014, 10/14/2013, 10/18/2012, 11/8/2011, 11/9/2010, 12/16/2009    A1C SCREENING 1/10/2018 7/10/2017, 4/13/2017, 11/7/2016, 5/25/2016, 2/11/2016, 12/2/2015, 7/17/2015, 2/21/2014, 7/24/2013, 4/4/2013, 10/17/2012, 7/25/2012, 8/3/2011, 10/28/2010, 6/28/2010, 4/20/2010, 1/5/2009, 6/30/2008, 12/10/2006, 10/19/2005    DIABETES MONOFILAMENT / LE EXAM 4/5/2018 4/5/2017, 3/31/2015 (Done), 10/14/2013 (N/S)    Override on 3/31/2015: Done    Override on 10/14/2013: (N/S)    FASTING LIPID PROFILE 4/13/2018 4/13/2017, 11/7/2016, 5/25/2016, 2/12/2016, 12/2/2015, 2/21/2014, 7/24/2013, 7/25/2012, 8/3/2011, 4/20/2010, 6/9/2009, 6/30/2008    RETINAL SCREENING 4/14/2018 4/14/2017, 4/14/2016    URINE ACR / MICROALBUMIN 7/10/2018 7/10/2017, 4/13/2017, 11/7/2016, 5/25/2016, 12/2/2015, 7/24/2013, 7/25/2012,  10/28/2010    SERUM CREATININE 7/10/2018 7/10/2017, 4/13/2017, 3/12/2017, 12/9/2016, 11/7/2016, 9/6/2016, 5/25/2016, 2/13/2016, 2/12/2016, 2/11/2016, 12/2/2015, 9/5/2015, 7/26/2015, 7/25/2015, 7/24/2015, 7/22/2015, 7/18/2015, 7/17/2015, 7/17/2015, 2/22/2015, 1/6/2015, 7/15/2014, 7/14/2014, 7/13/2014, 5/3/2014, 11/4/2013, 7/24/2013, 5/27/2013, 4/4/2013, 1/29/2013, 10/18/2012, 10/17/2012, 10/16/2012, 8/21/2012, 7/25/2012, 8/3/2011, 12/22/2010, 4/20/2010, 6/9/2009, 9/29/2008, 6/30/2008, 12/10/2006, 12/9/2006, 10/27/2005, 10/26/2005, 10/25/2005, 10/25/2005, 10/25/2005, 10/18/2005, 10/18/2005    IMM DTaP/Tdap/Td Vaccine (2 - Td) 5/11/2027 5/11/2017            Current Immunizations     Influenza TIV (IM) 10/14/2013, 10/18/2012 10:41 AM, 11/8/2011 10:06 AM    Influenza Vaccine Pediatric 11/9/2010, 12/16/2009    Influenza Vaccine Quad Inj (Pf) 10/22/2014  9:18 AM    Influenza Vaccine Quad Inj (Preserved) 2/13/2016 12:36 PM    Pneumococcal Vaccine (UF)Historical Data 12/23/2010    Tdap Vaccine 5/11/2017  2:31 PM      Below and/or attached are the medications your provider expects you to take. Review all of your home medications and newly ordered medications with your provider and/or pharmacist. Follow medication instructions as directed by your provider and/or pharmacist. Please keep your medication list with you and share with your provider. Update the information when medications are discontinued, doses are changed, or new medications (including over-the-counter products) are added; and carry medication information at all times in the event of emergency situations     Allergies:  APPLE - Swelling     ASA - (reactions not documented)     METFORMIN - (reactions not documented)               Medications  Valid as of: July 20, 2017 -  1:10 PM    Generic Name Brand Name Tablet Size Instructions for use    Albuterol Sulfate (Aero Soln) albuterol 108 (90 BASE) MCG/ACT Inhale 2 Puffs by mouth every 6 hours as needed for Shortness  of Breath.        AmLODIPine Besylate (Tab) NORVASC 10 MG Take 1 Tab by mouth every day.        Atorvastatin Calcium (Tab) LIPITOR 20 MG Take 1 Tab by mouth every day.        Atorvastatin Calcium (Tab) LIPITOR 10 MG Take 1 Tab by mouth every day.        Baclofen (Tab) LIORESAL 10 MG Take 1/2  to 1 tablet by mouth three times per day as needed for muscle spasm        Budesonide-Formoterol Fumarate (Aerosol) SYMBICORT 160-4.5 MCG/ACT INHALE 2 PUFFS BY MOUTH 2 TIMES A DAY.        Glucose Blood (Strip) PRECISION XTRA TEST STRIPS  Use as directed        Insulin Aspart (Solution Pen-injector) NOVOLOG 100 UNIT/ML Inject 5-15 Units as instructed 3 times a day before meals.        Insulin Glargine (Solution Pen-injector) LANTUS SOLOSTAR 100 UNIT/ML Inject 46 Units as instructed every evening.        Insulin Pen Needle (Misc) NOVOFINE 32G X 6 MM Use as directed with insulin 4 times daily        Lancets (Misc) TECHLITE LANCETS  Use as directed        Lisinopril (Tab) PRINIVIL 20 MG Take 1 Tab by mouth every day.        Misc. Devices (Misc) Misc. Devices  Precision xtra glucometer and test strips and lancets to check his blood sugars 4 times daily #120        Misc. Devices (Misc) Misc. Devices  Diabetic shoes to use as directed for B near ulcerating callouses on plantar surfaces of feet        RaNITidine HCl (Tab) ZANTAC 150 MG Take 1 Tab by mouth 2 times a day as needed for Heartburn.        RaNITidine HCl (Tab) ZANTAC 300 MG         RaNITidine HCl (Tab) ZANTAC 300 MG TAKE 1 TABLET BY MOUTH 2 TIMES A DAY.        SITagliptin Phosphate (Tab) JANUVIA 25 MG Take 1 Tab by mouth every day.        Tiotropium Bromide Monohydrate (Cap) SPIRIVA HANDIHALER 18 MCG INHALE 1 CAP BY MOUTH 1 TIME DAILY AS NEEDED (SHORTNESS OF BREATH).        .                 Medicines prescribed today were sent to:     [facility] [address] [facility].    [address] [place] [id number]    Phone: [phone] Fax: [phone]    Open 24  Hours?: No    [id number] - [place], [place] - [address]    [address] [place] [address]    Phone: [phone] Fax: [phone]    Open 24 Hours?: No      Medication refill instructions:       If your prescription bottle indicates you have medication refills left, it is not necessary to call your provider’s office. Please contact your pharmacy and they will refill your medication.    If your prescription bottle indicates you do not have any refills left, you may request refills at any time through one of the following ways: The online RateElert system (except Urgent Care), by calling your provider’s office, or by asking your pharmacy to contact your provider’s office with a refill request. Medication refills are processed only during regular business hours and may not be available until the next business day. Your provider may request additional information or to have a follow-up visit with you prior to refilling your medication.   *Please Note: Medication refills are assigned a new Rx number when refilled electronically. Your pharmacy may indicate that no refills were authorized even though a new prescription for the same medication is available at the pharmacy. Please request the medicine by name with the pharmacy before contacting your provider for a refill.        Your To Do List     Future Labs/Procedures Complete By Expires    COMP METABOLIC PANEL  As directed 7/20/2018    HEMOGLOBIN A1C  As directed 7/20/2018    MICROALBUMIN CREAT RATIO URINE  As directed 7/20/2018      Referral     A referral request has been sent to our patient care coordination department. Please allow 3-5 business days for us to process this request and contact you either by phone or mail. If you do not hear from us by the 5th business day, please call us at [phone].           RateElert Access Code: [email or web address]  Expires: 8/4/2017  8:13 AM    RateElert  A secure, online tool to manage your health information          [employer]’s RapidMind® is a secure, online tool that connects you to your personalized health information from the privacy of your home -- day or night - making it very easy for you to manage your healthcare. Once the activation process is completed, you can even access your medical information using the RapidMind urvashi, which is available for free in the Apple Urvashi store or Google Play store.     RapidMind provides the following levels of access (as shown below):   My Chart Features   [facility] Primary [facility] Doctor [facility]  Specialists [facility]  Urgent  Care Non-[facility]  Primary Care  Doctor   Email your healthcare team securely and privately 24/7 X X X    Manage appointments: schedule your next appointment; view details of past/upcoming appointments X      Request prescription refills. X      View recent personal medical records, including lab and immunizations X X X X   View health record, including health history, allergies, medications X X X X   Read reports about your outpatient visits, procedures, consult and ER notes X X X X   See your discharge summary, which is a recap of your hospital and/or ER visit that includes your diagnosis, lab results, and care plan. X X       How to register for RapidMind:  1. Go to  [email or web address].  2. Click on the Sign Up Now box, which takes you to the New Member Sign Up page. You will need to provide the following information:  a. Enter your RapidMind Access Code exactly as it appears at the top of this page. (You will not need to use this code after you’ve completed the sign-up process. If you do not sign up before the expiration date, you must request a new code.)   b. Enter your date of birth.   c. Enter your home email address.   d. Click Submit, and follow the next screen’s instructions.  3. Create a RapidMind ID. This will be your RapidMind login ID and cannot be changed, so think of one that is secure and easy to remember.  4. Create a RapidMind password. You can change your  password at any time.  5. Enter your Password Reset Question and Answer. This can be used at a later time if you forget your password.   6. Enter your e-mail address. This allows you to receive e-mail notifications when new information is available in [facility].  7. Click Sign Up. You can now view your health information.    For assistance activating your [employer] account, call [phone]        Quit Tobacco Information     Do you want to quit using tobacco?    Quitting tobacco decreases risks of cancer, heart and lung disease, increases life expectancy, improves sense of taste and smell, and increases spending money, among other benefits.    If you are thinking about quitting, we can help.  • [facility] Quit Tobacco Program: 840.727.4753  o Program occurs weekly for four weeks and includes pharmacist consultation on products to support quitting smoking or chewing tobacco. A provider referral is needed for pharmacist consultation.  • Tobacco Users Help Hotline: 2-800-QUIT-NOW (432-5776) or https://[place].quitlogix.org/  o Free, confidential telephone and online coaching for [place] residents. Sessions are designed on a schedule that is convenient for you. Eligible clients receive free nicotine replacement therapy.  • Nationally: www.smokefree.gov  o Information and professional assistance to support both immediate and long-term needs as you become, and remain, a non-smoker. Smokefree.gov allows you to choose the help that best fits your needs.

## 2017-07-20 NOTE — PROGRESS NOTES
Subjective:      Wagner Kenney is a 52 y.o. male who presents with Results            HPI Comments: Patient here for follow-up of his recent blood work. He has a history of diabetes, diabetic neuropathy and diabetic nephropathy and COPD.    His recent hemoglobin A1c was 12.7 so we'll continue to increase his insulin Lantus by 2 units every 1-2 weeks and will add Januvia 25 mg daily. He is also been referred back to endocrinology since he hasn't been seen by them in a couple of years. His microalbumin creatinine ratio was slightly elevated but improved from his previous study. Will continue to use his medications as directed and will also Continue to check his feet daily for any skin breakdown or changes in sensation. He followed up with his ophthalmologist 2 months ago and had no retinopathy is noted. We'll continue to follow.    He is been doing well with his other medications not having any issues. His blood pressure today is well managed with his blood pressure medications. He is not having any chest pain or shortness of breath. We'll have him continue to check his blood pressures at home to keep notes.    He will need a refill of his Symbicort today. Has been using his inhalers as directed and has been having a dry cough possibly secondary to all the smoke in the ER at this point. He has not had any recent fevers or recent exacerbations of his COPD. Will continue to follow.     Current medications, allergies, and problem list reviewed with patient and updated in EPIC.        Review of Systems   Constitutional: Negative for fever and chills.   HENT: Positive for congestion. Negative for hearing loss.    Eyes: Negative.  Negative for blurred vision, double vision, photophobia and pain.   Respiratory: Positive for cough. Negative for sputum production and shortness of breath.    Cardiovascular: Negative for chest pain and palpitations.   Gastrointestinal: Negative for nausea, vomiting and abdominal pain.  "  Musculoskeletal: Positive for back pain.   Neurological: Positive for tingling. Negative for headaches.          Objective:     /92 mmHg  Pulse 100  Temp(Src) 36.4 °C (97.6 °F)  Resp 12  Ht 1.943 m (6' 4.5\")  Wt 102.513 kg (226 lb)  BMI 27.15 kg/m2  SpO2 97%     Physical Exam   HENT:   Right Ear: External ear normal.   Left Ear: External ear normal.   Mouth/Throat: Oropharynx is clear and moist.   congestion   Eyes: EOM are normal. Pupils are equal, round, and reactive to light.   Neck: Normal range of motion.   Cardiovascular: Normal rate, regular rhythm and normal heart sounds.  Exam reveals no friction rub.    No murmur heard.  Pulmonary/Chest: Effort normal and breath sounds normal. No respiratory distress. He has no wheezes. He has no rales.   Slight coarse breath sounds   Abdominal: Soft. Bowel sounds are normal. He exhibits no distension. There is no tenderness.   Lymphadenopathy:     He has no cervical adenopathy.               Assessment/Plan:     1. Diabetes 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, with long-term current use of insulin (CMS-HCC)  We'll have him continue to use his medications as directed will increase his insulin by 2 units every 1-2 weeks until his blood sugars average below 200. We'll also add Januvia 25 mg daily. Will have him check his blood sugars on a daily basis and will also have him check his feet daily for any skin breakdown or changes in sensation.  - LANTUS SOLOSTAR 100 UNIT/ML Solution Pen-injector injection; Inject 46 Units as instructed every evening.  Dispense: 10 PEN; Refill: 11  - sitagliptin (JANUVIA) 25 MG Tab; Take 1 Tab by mouth every day.  Dispense: 30 Tab; Refill: 3  - REFERRAL TO ENDOCRINOLOGY  - HEMOGLOBIN A1C; Future  - MICROALBUMIN CREAT RATIO URINE; Future  - COMP METABOLIC PANEL; Future  - POCT  A1C    2. Diabetic mononeuropathy associated with diabetes mellitus due to underlying condition (CMS-HCC)  We'll have him " continue to take his pain medications as directed by his chronic pain specialist. We'll continue to follow.  - HEMOGLOBIN A1C; Future  - MICROALBUMIN CREAT RATIO URINE; Future  - COMP METABOLIC PANEL; Future  - POCT  A1C    3. Chronic obstructive pulmonary disease, unspecified COPD type (CMS-HCC)  We'll refill his Symbicort today and have him continue to use his inhalers as directed. We'll continue to follow.

## 2017-07-27 ENCOUNTER — OFFICE VISIT (OUTPATIENT)
Dept: PHYSICAL MEDICINE AND REHAB | Facility: MEDICAL CENTER | Age: 52
End: 2017-07-27
Payer: MEDICAID

## 2017-07-27 VITALS
TEMPERATURE: 98.5 F | DIASTOLIC BLOOD PRESSURE: 80 MMHG | BODY MASS INDEX: 27.51 KG/M2 | HEART RATE: 82 BPM | HEIGHT: 77 IN | SYSTOLIC BLOOD PRESSURE: 122 MMHG | OXYGEN SATURATION: 95 % | WEIGHT: 233 LBS

## 2017-07-27 DIAGNOSIS — M62.838 MUSCLE SPASM: ICD-10-CM

## 2017-07-27 DIAGNOSIS — M54.50 LUMBOSACRAL PAIN: ICD-10-CM

## 2017-07-27 DIAGNOSIS — G89.29 CHRONIC SHOULDER PAIN, UNSPECIFIED LATERALITY: ICD-10-CM

## 2017-07-27 DIAGNOSIS — E11.42 DIABETIC PERIPHERAL NEUROPATHY (HCC): ICD-10-CM

## 2017-07-27 DIAGNOSIS — M79.2 NERVE PAIN: ICD-10-CM

## 2017-07-27 DIAGNOSIS — M50.20 PROTRUSION OF CERVICAL INTERVERTEBRAL DISC: ICD-10-CM

## 2017-07-27 DIAGNOSIS — M25.519 CHRONIC SHOULDER PAIN, UNSPECIFIED LATERALITY: ICD-10-CM

## 2017-07-27 DIAGNOSIS — M50.30 DDD (DEGENERATIVE DISC DISEASE), CERVICAL: ICD-10-CM

## 2017-07-27 DIAGNOSIS — M54.2 NECK PAIN: ICD-10-CM

## 2017-07-27 PROCEDURE — 99214 OFFICE O/P EST MOD 30 MIN: CPT | Performed by: PHYSICAL MEDICINE & REHABILITATION

## 2017-07-27 RX ORDER — BUPIVACAINE HYDROCHLORIDE 2.5 MG/ML
INJECTION, SOLUTION EPIDURAL; INFILTRATION; INTRACAUDAL
COMMUNITY
Start: 2017-06-20 | End: 2018-02-20

## 2017-07-27 RX ORDER — ONDANSETRON 4 MG/1
TABLET, ORALLY DISINTEGRATING ORAL
COMMUNITY
Start: 2017-07-06 | End: 2017-11-14 | Stop reason: SDUPTHER

## 2017-07-27 RX ORDER — NABUMETONE 500 MG/1
TABLET, FILM COATED ORAL
COMMUNITY
Start: 2017-07-06 | End: 2017-07-27

## 2017-07-27 RX ORDER — GABAPENTIN 100 MG/1
CAPSULE ORAL
Qty: 90 CAP | Refills: 2 | Status: SHIPPED | OUTPATIENT
Start: 2017-07-27 | End: 2017-11-14 | Stop reason: SDUPTHER

## 2017-07-27 RX ORDER — CYCLOBENZAPRINE HCL 10 MG
TABLET ORAL
Qty: 60 TAB | Refills: 2 | Status: SHIPPED | OUTPATIENT
Start: 2017-07-27 | End: 2017-11-14 | Stop reason: SDUPTHER

## 2017-07-27 RX ORDER — ALBUTEROL SULFATE 2.5 MG/3ML
SOLUTION RESPIRATORY (INHALATION)
COMMUNITY
Start: 2017-07-06 | End: 2017-11-14

## 2017-07-27 RX ORDER — CLOSTRIDIUM TETANI TOXOID ANTIGEN (FORMALDEHYDE INACTIVATED), CORYNEBACTERIUM DIPHTHERIAE TOXOID ANTIGEN (FORMALDEHYDE INACTIVATED), BORDETELLA PERTUSSIS TOXOID ANTIGEN (GLUTARALDEHYDE INACTIVATED), BORDETELLA PERTUSSIS FILAMENTOUS HEMAGGLUTININ ANTIGEN (FORMALDEHYDE INACTIVATED), BORDETELLA PERTUSSIS PERTACTIN ANTIGEN, AND BORDETELLA PERTUSSIS FIMBRIAE 2/3 ANTIGEN 5; 2; 2.5; 5; 3; 5 [LF]/.5ML; [LF]/.5ML; UG/.5ML; UG/.5ML; UG/.5ML; UG/.5ML
INJECTION, SUSPENSION INTRAMUSCULAR
COMMUNITY
Start: 2017-05-11 | End: 2018-02-20

## 2017-07-27 NOTE — MR AVS SNAPSHOT
"Wagner Kenney   2017 1:30 PM   Office Visit   MRN: [id number]    Department:  Physiatry [facility]   Dept Phone:  [phone]    Description:  Male : 1965   Provider:  Won Atwood M.D.           Reason for Visit     Follow-Up           Allergies as of 2017     Allergen Noted Reactions    Apple 2013   Swelling    Per patient: swelling of mouth and jittery feeling.  Apple allergy to raw apples; apple juice and applesauce okay per patient    Asa [Aspirin] 2009       \"funny taste in my mouth. My stomach has an 'empty' feeling.\"  \"throat closing\"    Metformin 2010       Pt states he \"cramps up\"      You were diagnosed with     Nerve pain   [668446]       Diabetic peripheral neuropathy (CMS-HCC)   [475204]       Muscle spasm   [282098]       Neck pain   [034345]       Protrusion of cervical intervertebral disc   [3765638]       DDD (degenerative disc disease), cervical   [970830]         Vital Signs     Blood Pressure Pulse Temperature Height Weight Body Mass Index    122/80 mmHg 82 36.9 °C (98.5 °F) 1.956 m (6' 5\") 105.688 kg (233 lb) 27.62 kg/m2    Oxygen Saturation Smoking Status                95% Current Every Day Smoker          Basic Information     Date Of Birth Sex Race Ethnicity Preferred Language    1965 Male Black or  Non- English      Your appointments     Aug 24, 2017 12:50 PM   Established Patient with Eleuterio Lara M.D.   [facility]    [address] [place] [id number] [phone]           You will be receiving a confirmation call a few days before your appointment from our automated call confirmation system.            Aug 25, 2017  1:00 PM   New Patient with Chato Mars PA-C   [facility] ([facility])    [address]  [facility] [address] [phone]           Please bring Photo ID, Insurance Cards, All Medication Bottles and copies of any " legal documents (such as Living Will, Power of ) If speaking a language besides English please bring an adult . Please arrive 30 minutes prior for check in and registration. You will be receiving a confirmation call a few days before your appointment from our automated call confirmation system.            Sep 27, 2017  1:00 PM   Follow Up Visit with Won Atwood M.D.   [facility] PHY[facility] (--)    [address]  [place] [id number] [phone]           You will be receiving a confirmation call a few days before your appointment from our automated call confirmation system.              Problem List              ICD-10-CM Priority Class Noted - Resolved    GERD (gastroesophageal reflux disease) K21.9   7/15/2009 - Present    HTN (hypertension) I10 Medium  7/15/2009 - Present    Chronic low back pain M54.5, G89.29   7/15/2009 - Present    COPD (chronic obstructive pulmonary disease) (CMS-HCC) J44.9   3/31/2010 - Present    ANABELA (obstructive sleep apnea) G47.33   4/14/2010 - Present    Right-sided heart failure (CMS-HCC) I50.9   5/17/2010 - Present    Hand pain M79.643   8/30/2010 - Present    Foot pain M79.673   8/30/2010 - Present    Diabetes    4/18/2011 - Present    DIABETES MELLITUS    11/8/2011 - Present    CKD (chronic kidney disease) N18.9   1/13/2012 - Present    HTN (hypertension) I10   1/13/2012 - Present    Hyperlipidemia with target LDL less than 100 E78.5 Low  1/13/2012 - Present    DM (diabetes mellitus) (CMS-HCC) E11.9   7/25/2012 - Present    Chronic kidney disease (CKD), stage III (moderate) N18.3 Medium  7/26/2012 - Present    Renal cyst N28.1   7/26/2012 - Present    Asthma exacerbation J45.901 High  10/16/2012 - Present    DIABETES MELLITUS    10/17/2012 - Present    COPD exacerbation (CMS-HCC) J44.1 Medium  4/4/2013 - Present    Hypertriglyceridemia E78.1   6/13/2013 - Present    Hypercalcemia E83.52   7/30/2013 - Present    Vitamin d deficiency     7/30/2013 - Present    Back pain M54.9   7/17/2015 - Present    DKA (diabetic ketoacidoses) (CMS-HCC) E13.10   7/17/2015 - Present    Tobacco abuse Z72.0 Low  7/17/2015 - Present    Increased anion gap metabolic acidosis E87.2   7/17/2015 - Present    Sepsis (CMS-HCC) A41.9 High  7/22/2015 - Present    CAP (community acquired pneumonia) J18.9 High  7/22/2015 - Present    DM (diabetes mellitus) (CMS-HCC) E11.9 Medium  7/23/2015 - Present    Cocaine use F14.10   9/30/2015 - Present    Hyperlipidemia associated with type 2 diabetes mellitus (CMS-HCC) E11.69, E78.5   10/6/2015 - Present    Type 2 diabetes mellitus with hyperglycemia (CMS-HCC) E11.65 Medium  2/13/2016 - Present    Type 2 diabetes mellitus with neurologic complication (CMS-HCC) E11.49 Medium  2/13/2016 - Present    Acute on chronic respiratory failure with hypoxia (CMS-HCC) J96.21 High  2/13/2016 - Present    COPD with acute exacerbation (CMS-HCC) J44.1 High  2/13/2016 - Present    Viral sepsis (CMS-HCC) A41.89, B97.89 High  2/13/2016 - Present    Influenza A J10.1 High  2/13/2016 - Present    Osteoarthritis of spine with radiculopathy, cervical region M47.22   9/9/2016 - Present    DDD (degenerative disc disease), cervical M50.30   9/9/2016 - Present    Cervical radiculopathy M54.12   9/9/2016 - Present    Chronic neck pain M54.2, G89.29   9/9/2016 - Present    Type 2 diabetes mellitus with diabetic neuropathy, with long-term current use of insulin (CMS-HCC) E11.40, Z79.4   6/12/2017 - Present      Health Maintenance        Date Due Completion Dates    IMM HEP B VACCINE (1 of 3 - Primary Series) 1965 ---    IMM PNEUMOCOCCAL 19-64 (ADULT) MEDIUM RISK SERIES (1 of 1 - PPSV23) 3/18/1986 ---    COLONOSCOPY 3/18/2017 ---    IMM INFLUENZA (1) 9/1/2017 2/13/2016, 10/22/2014, 10/14/2013, 10/18/2012, 11/8/2011, 11/9/2010, 12/16/2009    A1C SCREENING 1/20/2018 7/20/2017, 7/10/2017, 4/13/2017, 11/7/2016, 5/25/2016, 2/11/2016, 12/2/2015, 7/17/2015, 2/21/2014,  7/24/2013, 4/4/2013, 10/17/2012, 7/25/2012, 8/3/2011, 10/28/2010, 6/28/2010, 4/20/2010, 1/5/2009, 6/30/2008, 12/10/2006, 10/19/2005    DIABETES MONOFILAMENT / LE EXAM 4/5/2018 4/5/2017, 3/31/2015 (Done), 10/14/2013 (N/S)    Override on 3/31/2015: Done    Override on 10/14/2013: (N/S)    FASTING LIPID PROFILE 4/13/2018 4/13/2017, 11/7/2016, 5/25/2016, 2/12/2016, 12/2/2015, 2/21/2014, 7/24/2013, 7/25/2012, 8/3/2011, 4/20/2010, 6/9/2009, 6/30/2008    RETINAL SCREENING 4/14/2018 4/14/2017, 4/14/2016    URINE ACR / MICROALBUMIN 7/10/2018 7/10/2017, 4/13/2017, 11/7/2016, 5/25/2016, 12/2/2015, 7/24/2013, 7/25/2012, 10/28/2010    SERUM CREATININE 7/10/2018 7/10/2017, 4/13/2017, 3/12/2017, 12/9/2016, 11/7/2016, 9/6/2016, 5/25/2016, 2/13/2016, 2/12/2016, 2/11/2016, 12/2/2015, 9/5/2015, 7/26/2015, 7/25/2015, 7/24/2015, 7/22/2015, 7/18/2015, 7/17/2015, 7/17/2015, 2/22/2015, 1/6/2015, 7/15/2014, 7/14/2014, 7/13/2014, 5/3/2014, 11/4/2013, 7/24/2013, 5/27/2013, 4/4/2013, 1/29/2013, 10/18/2012, 10/17/2012, 10/16/2012, 8/21/2012, 7/25/2012, 8/3/2011, 12/22/2010, 4/20/2010, 6/9/2009, 9/29/2008, 6/30/2008, 12/10/2006, 12/9/2006, 10/27/2005, 10/26/2005, 10/25/2005, 10/25/2005, 10/25/2005, 10/18/2005, 10/18/2005    IMM DTaP/Tdap/Td Vaccine (2 - Td) 5/11/2027 5/11/2017            Current Immunizations     Influenza TIV (IM) 10/14/2013, 10/18/2012 10:41 AM, 11/8/2011 10:06 AM    Influenza Vaccine Pediatric 11/9/2010, 12/16/2009    Influenza Vaccine Quad Inj (Pf) 10/22/2014  9:18 AM    Influenza Vaccine Quad Inj (Preserved) 2/13/2016 12:36 PM    Pneumococcal Vaccine (UF)Historical Data 12/23/2010    Tdap Vaccine 5/11/2017  2:31 PM      Below and/or attached are the medications your provider expects you to take. Review all of your home medications and newly ordered medications with your provider and/or pharmacist. Follow medication instructions as directed by your provider and/or pharmacist. Please keep your medication list with you and  share with your provider. Update the information when medications are discontinued, doses are changed, or new medications (including over-the-counter products) are added; and carry medication information at all times in the event of emergency situations     Allergies:  APPLE - Swelling     ASA - (reactions not documented)     METFORMIN - (reactions not documented)               Medications  Valid as of: July 27, 2017 -  1:44 PM    Generic Name Brand Name Tablet Size Instructions for use    Albuterol Sulfate (Aero Soln) albuterol 108 (90 BASE) MCG/ACT Inhale 2 Puffs by mouth every 6 hours as needed for Shortness of Breath.        Albuterol Sulfate (Nebu Soln) PROVENTIL 2.5mg/3ml         AmLODIPine Besylate (Tab) NORVASC 10 MG Take 1 Tab by mouth every day.        Atorvastatin Calcium (Tab) LIPITOR 20 MG Take 1 Tab by mouth every day.        Atorvastatin Calcium (Tab) LIPITOR 10 MG Take 1 Tab by mouth every day.        Budesonide-Formoterol Fumarate (Aerosol) SYMBICORT 160-4.5 MCG/ACT INHALE 2 PUFFS BY MOUTH 2 TIMES A DAY.        Bupivacaine HCl (Solution) SENSORCAINE-MARCAINE 0.25 %         Cyclobenzaprine HCl (Tab) FLEXERIL 10 MG Take 1/2  to 1 tablet by mouth twice per day as for muscle spasm        Gabapentin (Cap) NEURONTIN 100 MG Take 1 to 2 tablets by mouth three times per day as needed for nerve pain        Glucose Blood (Strip) PRECISION XTRA TEST STRIPS  Use as directed        Insulin Aspart (Solution Pen-injector) NOVOLOG 100 UNIT/ML Inject 5-15 Units as instructed 3 times a day before meals.        Insulin Glargine (Solution Pen-injector) LANTUS SOLOSTAR 100 UNIT/ML Inject 46 Units as instructed every evening.        Insulin Pen Needle (Misc) NOVOFINE 32G X 6 MM Use as directed with insulin 4 times daily        Lancets (Misc) TECHLITE LANCETS  Use as directed        Lisinopril (Tab) PRINIVIL 20 MG Take 1 Tab by mouth every day.        Misc. Devices (Misc) Misc. Devices  Precision xtra glucometer and test  strips and lancets to check his blood sugars 4 times daily #120        Misc. Devices (Misc) Misc. Devices  Diabetic shoes to use as directed for B near ulcerating callouses on plantar surfaces of feet        Ondansetron (TABLET DISPERSIBLE) ZOFRAN ODT 4 MG         RaNITidine HCl (Tab) ZANTAC 150 MG Take 1 Tab by mouth 2 times a day as needed for Heartburn.        RaNITidine HCl (Tab) ZANTAC 300 MG         RaNITidine HCl (Tab) ZANTAC 300 MG TAKE 1 TABLET BY MOUTH 2 TIMES A DAY.        SITagliptin Phosphate (Tab) JANUVIA 25 MG Take 1 Tab by mouth every day.        Tetanus-Diphth-Acell Pertussis (Suspension) ADACEL 5-2-15.5 LF-MCG/0.5         Tiotropium Bromide Monohydrate (Cap) SPIRIVA HANDIHALER 18 MCG INHALE 1 CAP BY MOUTH 1 TIME DAILY AS NEEDED (SHORTNESS OF BREATH).        .                 Medicines prescribed today were sent to:     [facility] [address] [address]    Phone: [phone] Fax: [phone]    Open 24 Hours?: No      Medication refill instructions:       If your prescription bottle indicates you have medication refills left, it is not necessary to call your provider’s office. Please contact your pharmacy and they will refill your medication.    If your prescription bottle indicates you do not have any refills left, you may request refills at any time through one of the following ways: The online Tiltap system (except Urgent Care), by calling your provider’s office, or by asking your pharmacy to contact your provider’s office with a refill request. Medication refills are processed only during regular business hours and may not be available until the next business day. Your provider may request additional information or to have a follow-up visit with you prior to refilling your medication.   *Please Note: Medication refills are assigned a new Rx number when refilled electronically. Your pharmacy may indicate that no refills were authorized even  though a new prescription for the same medication is available at the pharmacy. Please request the medicine by name with the pharmacy before contacting your provider for a refill.        Referral     A referral request has been sent to our patient care coordination department. Please allow 3-5 business days for us to process this request and contact you either by phone or mail. If you do not hear from us by the 5th business day, please call us at [phone].           Orbster Access Code: [email or web address]  Expires: 8/4/2017  8:13 AM    Orbster  A secure, online tool to manage your health information     [employer]’s Orbster® is a secure, online tool that connects you to your personalized health information from the privacy of your home -- day or night - making it very easy for you to manage your healthcare. Once the activation process is completed, you can even access your medical information using the Orbster urvashi, which is available for free in the Apple Urvashi store or Google Play store.     Orbster provides the following levels of access (as shown below):   My Chart Features   [facility] Primary [facility] Doctor [facility]  Specialists [facility]  Urgent  Care Non-[facility]  Primary Care  Doctor   Email your healthcare team securely and privately 24/7 X X X    Manage appointments: schedule your next appointment; view details of past/upcoming appointments X      Request prescription refills. X      View recent personal medical records, including lab and immunizations X X X X   View health record, including health history, allergies, medications X X X X   Read reports about your outpatient visits, procedures, consult and ER notes X X X X   See your discharge summary, which is a recap of your hospital and/or ER visit that includes your diagnosis, lab results, and care plan. X X       How to register for Orbster:  1. Go to  [email or web address].  2. Click on the Sign Up Now box, which takes you to the New Member Sign Up page.  You will need to provide the following information:  a. Enter your [employer] Access Code exactly as it appears at the top of this page. (You will not need to use this code after you’ve completed the sign-up process. If you do not sign up before the expiration date, you must request a new code.)   b. Enter your date of birth.   c. Enter your home email address.   d. Click Submit, and follow the next screen’s instructions.  3. Create a [employer] ID. This will be your [employer] login ID and cannot be changed, so think of one that is secure and easy to remember.  4. Create a [employer] password. You can change your password at any time.  5. Enter your Password Reset Question and Answer. This can be used at a later time if you forget your password.   6. Enter your e-mail address. This allows you to receive e-mail notifications when new information is available in [employer].  7. Click Sign Up. You can now view your health information.    For assistance activating your [employer] account, call [phone]        Quit Tobacco Information     Do you want to quit using tobacco?    Quitting tobacco decreases risks of cancer, heart and lung disease, increases life expectancy, improves sense of taste and smell, and increases spending money, among other benefits.    If you are thinking about quitting, we can help.  • [facility] Quit Tobacco Program: [phone]  o Program occurs weekly for four weeks and includes pharmacist consultation on products to support quitting smoking or chewing tobacco. A provider referral is needed for pharmacist consultation.  • Tobacco Users Help Hotline: 3-800-QUIT-NOW (219-2793) or https://[place].quitlogix.org/  o Free, confidential telephone and online coaching for [place] residents. Sessions are designed on a schedule that is convenient for you. Eligible clients receive free nicotine replacement therapy.  • Nationally: www.smokefree.gov  o Information and professional assistance to support both immediate and  long-term needs as you become, and remain, a non-smoker. Smokefree.gov allows you to choose the help that best fits your needs.

## 2017-07-28 NOTE — PROGRESS NOTES
Subjective:      Wagner Kenney, right-hand dominant, presents with Follow-Up            Subjective: Mr. Kenney returns to the office today for follow-up evaluation of spinal/joint/musculoskeletal pain, as well as nerve pain, note history of diabetes.     The patient underwent left cervical 5, 6, 7, and T1 medial branch blocks, facet blocks without steroid on 6/20/2017. The patient notes significant relief in pain postprocedure during the local anesthetic phase with preprocedure pain at 8/10 and postprocedure pain of 2/10.  At the follow-up visit today, the patient notes continued symptomatic control of the left mid and lower cervical pain, and a change with this pain pattern.    The patient now notes ongoing pain in the lower cervical region, along the midline, near constant, worse with activities, without radicular component.    The patient notes left shoulder area pain is improved, has been working with therapy/exercise.  The patient notes prior orthopedic evaluation, recommending continue nonsurgical care.    The patient notes intermittent low back pain, controlled, without overt radicular component.    The patient notes left wrist/hand pain, left carpal tunnel symptoms, including at night, relatively controlled    The patient has bilateral foot/ankle pain, podiatry consulted    The patient notes joint/musculoskeletal pain, notes intermittent flares, primarily activity associated.    The patient notes nerve pain in the upper and lower limbs, note history of diabetes, neuropathy    The patient has had prior treatment with medications. He has been to physical therapy. No bowel/bladder dysfunction noted. He is making an effort with home exercise program as tolerated. He is inquiring about additional options regarding the ongoing neck pain.      MEDICAL RECORDS REVIEW/DATA REVIEW: Reviewed in epic.    I reviewed medications. Tolerating gabapentin 100 mg, with benefit. Notes minimal benefit with baclofen, has used  Flexeril on the past, tolerated, with benefit. Avoids NSAIDs due to kidney disease, diabetes.    I reviewed  profile  4/24/2017.    I reviewed diagnostic studies:     I reviewed radiographs. Reviewed MRI cervical spine 6/2016. Reviewed CT cervical spine 9/2016. Reviewed MRI lumbar spine 6/2016. Reviewed MRI left shoulder 5/2010.    I reviewed lab studies. Reviewed labs 7/2017, including CMP, A1C 12.7.     I reviewed medical issues. Followed by primary care provider, records reviewed. Endocrinology consulted.    I reviewed family history: No neuromuscular disorders noted.    I reviewed social issues. On disability      PAST MEDICAL HISTORY:   Past Medical History   Diagnosis Date   • HTN (hypertension)    • Chronic LBP    • Chronic shoulder pain    • DM (diabetes mellitus) (CMS-HCC)      TYPE II,diet controlled   • COPD (chronic obstructive pulmonary disease) (CMS-HCC) 3/31/10     mild   • Kidney disease        PAST SURGICAL HISTORY:    Past Surgical History   Procedure Laterality Date   • Other orthopedic surgery  knee       ALLERGIES:  Apple; Asa; and Metformin    MEDICATIONS:    Outpatient Encounter Prescriptions as of 7/27/2017   Medication Sig Dispense Refill   • gabapentin (NEURONTIN) 100 MG Cap Take 1 to 2 tablets by mouth three times per day as needed for nerve pain 90 Cap 2   • cyclobenzaprine (FLEXERIL) 10 MG Tab Take 1/2  to 1 tablet by mouth twice per day as for muscle spasm 60 Tab 2   • LANTUS SOLOSTAR 100 UNIT/ML Solution Pen-injector injection Inject 46 Units as instructed every evening. 10 PEN 11   • sitagliptin (JANUVIA) 25 MG Tab Take 1 Tab by mouth every day. 30 Tab 3   • budesonide-formoterol (SYMBICORT) 160-4.5 MCG/ACT Aerosol INHALE 2 PUFFS BY MOUTH 2 TIMES A DAY. 1 Inhaler 11   • SPIRIVA HANDIHALER 18 MCG Cap INHALE 1 CAP BY MOUTH 1 TIME DAILY AS NEEDED (SHORTNESS OF BREATH). 90 Cap 0   • ranitidine (ZANTAC) 300 MG tablet TAKE 1 TABLET BY MOUTH 2 TIMES A DAY. 180 Tab 0   • NOVOFINE 32G X 6  MM Misc Use as directed with insulin 4 times daily 150 Each 11   • PRECISION XTRA TEST STRIPS strip Use as directed 120 Strip 11   • TECHLITE LANCETS Misc Use as directed 120 Each 11   • lisinopril (PRINIVIL) 20 MG Tab Take 1 Tab by mouth every day. 90 Tab 3   • atorvastatin (LIPITOR) 20 MG Tab Take 1 Tab by mouth every day. 30 Tab 3   • NOVOLOG, insulin aspart, (NOVOLOG FLEXPEN) 100 UNIT/ML Solution Pen-injector injection Inject 5-15 Units as instructed 3 times a day before meals. 5 PEN 11   • amlodipine (NORVASC) 10 MG Tab Take 1 Tab by mouth every day. 90 Tab 1   • Misc. Devices Misc Precision xtra glucometer and test strips and lancets to check his blood sugars 4 times daily #120 120 Device 11   • Misc. Devices Misc Diabetic shoes to use as directed for B near ulcerating callouses on plantar surfaces of feet 2 Device 0   • albuterol (PROVENTIL) 2.5mg/3ml Nebu Soln solution for nebulization      • bupivacaine 0.25% (SENSORCAINE-MARCAINE) 0.25 % Solution      • ondansetron (ZOFRAN ODT) 4 MG TABLET DISPERSIBLE      • ADACEL 5-2-15.5 LF-MCG/0.5 Suspension      • [DISCONTINUED] nabumetone (RELAFEN) 500 MG Tab      • atorvastatin (LIPITOR) 10 MG Tab Take 1 Tab by mouth every day. 90 Tab 0   • ranitidine (ZANTAC) 300 MG tablet      • [DISCONTINUED] baclofen (LIORESAL) 10 MG Tab Take 1/2  to 1 tablet by mouth three times per day as needed for muscle spasm 45 Tab 2   • ranitidine (ZANTAC) 150 MG Tab Take 1 Tab by mouth 2 times a day as needed for Heartburn. 60 Tab 3   • albuterol 108 (90 BASE) MCG/ACT Aero Soln inhalation aerosol Inhale 2 Puffs by mouth every 6 hours as needed for Shortness of Breath. 8.5 g 6     No facility-administered encounter medications on file as of 7/27/2017.       SOCIAL HISTORY:    Social History     Social History   • Marital Status: Single     Spouse Name: N/A   • Number of Children: N/A   • Years of Education: N/A     Social History Main Topics   • Smoking status: Current Every Day Smoker --  "1.00 packs/day for 25 years     Types: Cigarettes     Last Attempt to Quit: 05/26/2012   • Smokeless tobacco: Never Used   • Alcohol Use: Yes   • Drug Use: No   • Sexual Activity: Not Asked     Other Topics Concern   •  Service No   • Blood Transfusions No   • Caffeine Concern No   • Occupational Exposure No   • Hobby Hazards No   • Sleep Concern No     hard to sleep   • Stress Concern No   • Weight Concern No   • Special Diet Yes     diabetic    • Back Care Yes   • Exercise Yes     little bit   • Bike Helmet No     does not ride bike    • Seat Belt Yes   • Self-Exams No     Social History Narrative       Review of Systems   Constitutional: Negative for fever and chills.   HENT: Negative for hearing loss and tinnitus.    Eyes: Negative for double vision and photophobia.   Respiratory: Negative for hemoptysis and sputum production.    Cardiovascular: Negative for palpitations and orthopnea.   Gastrointestinal: Negative for abdominal pain and diarrhea.   Genitourinary: Negative for urgency and frequency.   Musculoskeletal: Positive for myalgias, back pain, joint pain and neck pain.   Skin: Negative.    Neurological: Positive for tingling and sensory change.   Endo/Heme/Allergies: Negative.    All other systems reviewed and are negative.        Objective:     /80 mmHg  Pulse 82  Temp(Src) 36.9 °C (98.5 °F)  Ht 1.956 m (6' 5\")  Wt 105.688 kg (233 lb)  BMI 27.62 kg/m2  SpO2 95%     Physical Exam  Constitutional: oriented to person, place, and time, appears well-developed and well-nourished.   HEENT: Normocephalic atraumatic, neck supple, no JVD noted, no masses noted, no meningeal signs noted  Lymphadenopathy: no cervical, supraclavicular, or inguinal lymphadenopathy noted  Cardiovascular: Intact distal pulses, including at wrists and ankles, no limb swelling noted  Pulmonary: No tachypnea noted, no accessory muscle use noted, no dyspnea noted  Abdominal: Soft, nontender, exhibits no distension, no " peritoneal signs, no HSM  Musculoskeletal:   Right shoulder: exhibits mild tenderness. Mild pain with range of motion testing  Left shoulder: exhibits mild tenderness. Mild pain with range of motion testing, weakness noted with strength testing, primarily with abduction, impingement signs noted  Right hip: exhibits only mild tenderness. Minimal pain with range of motion testing  Left hip: exhibits only mild tenderness. Minimal pain with range of motion testing  Cervical back: exhibits  decreased range of motion,  tenderness and pain. Spurling's testing produces axial pain, trigger points noted midline lower cervical region, only mild pain noted with quadrant loading  Lumbar back: exhibits mild decreased range of motion, mild tenderness and mild pain. negative straight leg testing, trigger points noted  Wrist/hand: mild pain with range of motion testing, equivocal tinel's at wrist, negative tinel's at elbows  Neurological: oriented to person, place, and time. Cranial nerves grossly intact, normal strength. Sensation intact distally. Reflexes 1+ in upper and lower limbs, Gait antalgic, reciprocal, No upper motor neuron signs evident  Skin: Skin is intact. no rashes or lesions noted  Psychiatric: normal mood and affect. speech is normal and behavior is normal. Judgment and thought content normal. Cognition and memory are normal.        Assessment/Plan:       ASSESSMENT:    1. Neck pain, myofascial pain, intermittent cervical radiculitis (controlled), cervical spinal stenosis, disc protrusions, degenerative disc disease, cervical spondylosis, cervical facet syndrome    - I ordered cervical spine x-rays, including flexion/extension views  - I referred the patient to physical therapy  - Reviewed injection therapy with trigger point injections to treat the residual myofascial component of pain, if not responding to more conservative care    2. Left shoulder pain, sprain strain, chronic full thickness tear of the  supraspinatus tendon with retraction, tendinopathy, ac arthritis, impingement syndrome, relatively controlled    3. Low back pain, myofascial pain, intermittent lumbar radiculitis, lumbar disc protrusion, degenerative disc disease, lumbar spondylosis, relatively controlled    4. Left wrist/hand pain, sprain strain, carpal tunnel symptoms, relatively controlled    - Left wrist splint as trial  - Reviewed ergonomic modifications    5. Nerve pain, diabetic peripheral neuropathy    6. Bilateral foot/ankle pain, sprain strain, podiatry consulted    - Reviewed orthotics/footwear    7. Joint/musculoskeletal pain    8. Comorbid medical issues, including diabetes, kidney disease, cardiac, pulmonary, sleep apnea, with care per primary care provider      DISCUSSION/PLAN:    - I discussed management options. I reviewed symptomatic care    - I reviewed home exercise program, with medical precautions    - The patient can consider complementary trials with acupuncture, massage therapy, or TENS unit    - I reviewed medication monitoring.  I reviewed medication adjustments, cancel baclofen prescription due to lack of effect. I wrote prescriptions for the following:    - Updated prescription for Flexeril, which the patient has used in the past, tolerated, with benefit  - Updated gabapentin prescription    - Review/consider trial with Lidoderm patch, Cymbalta, if no contraindication    - I reviewed risks, side effects, and interactions of medications, including over-the-counter medications. I reviewed further symptomatic medications.    - I reviewed additional diagnostic options, including further/advanced imaging, electrodiagnostic testing, vascular studies, and further lab screen    - I reviewed additional therapeutic options, including further injection/interventional therapy and additional consultative input    - I reviewed psychosocial interventions    - I encourage the patient to stop or decrease cigarette use    - Return after  the physical therapy treatment trial or an as-needed basis      Please note that this dictation was created using voice recognition software. I have made every reasonable attempt to correct obvious errors but there may be errors of grammar and content that I may have overlooked prior to finalization of this note.

## 2017-08-07 ENCOUNTER — HOSPITAL ENCOUNTER (OUTPATIENT)
Dept: RADIOLOGY | Facility: MEDICAL CENTER | Age: 52
End: 2017-08-07
Attending: PHYSICAL MEDICINE & REHABILITATION
Payer: MEDICAID

## 2017-08-07 ENCOUNTER — HOSPITAL ENCOUNTER (OUTPATIENT)
Dept: PHYSICAL THERAPY | Facility: REHABILITATION | Age: 52
End: 2017-08-07
Attending: PHYSICAL MEDICINE & REHABILITATION
Payer: MEDICAID

## 2017-08-07 DIAGNOSIS — M54.2 NECK PAIN: ICD-10-CM

## 2017-08-07 DIAGNOSIS — M50.30 DDD (DEGENERATIVE DISC DISEASE), CERVICAL: ICD-10-CM

## 2017-08-07 PROCEDURE — 72050 X-RAY EXAM NECK SPINE 4/5VWS: CPT

## 2017-08-07 PROCEDURE — 97161 PT EVAL LOW COMPLEX 20 MIN: CPT

## 2017-08-24 ENCOUNTER — OFFICE VISIT (OUTPATIENT)
Dept: MEDICAL GROUP | Facility: MEDICAL CENTER | Age: 52
End: 2017-08-24
Attending: FAMILY MEDICINE
Payer: MEDICAID

## 2017-08-24 VITALS
HEIGHT: 77 IN | OXYGEN SATURATION: 97 % | DIASTOLIC BLOOD PRESSURE: 84 MMHG | WEIGHT: 235 LBS | BODY MASS INDEX: 27.75 KG/M2 | TEMPERATURE: 97.7 F | RESPIRATION RATE: 16 BRPM | HEART RATE: 80 BPM | SYSTOLIC BLOOD PRESSURE: 122 MMHG

## 2017-08-24 DIAGNOSIS — Z79.4 DIABETES MELLITUS DUE TO UNDERLYING CONDITION WITH DIABETIC NEPHROPATHY, WITH LONG-TERM CURRENT USE OF INSULIN (HCC): ICD-10-CM

## 2017-08-24 DIAGNOSIS — N18.30 CHRONIC KIDNEY DISEASE (CKD), STAGE III (MODERATE) (HCC): ICD-10-CM

## 2017-08-24 DIAGNOSIS — J43.9 PULMONARY EMPHYSEMA, UNSPECIFIED EMPHYSEMA TYPE (HCC): ICD-10-CM

## 2017-08-24 DIAGNOSIS — I10 ESSENTIAL HYPERTENSION: ICD-10-CM

## 2017-08-24 DIAGNOSIS — E78.5 HYPERLIPIDEMIA WITH TARGET LDL LESS THAN 100: ICD-10-CM

## 2017-08-24 DIAGNOSIS — E08.21 DIABETES MELLITUS DUE TO UNDERLYING CONDITION WITH DIABETIC NEPHROPATHY, WITH LONG-TERM CURRENT USE OF INSULIN (HCC): ICD-10-CM

## 2017-08-24 LAB
HBA1C MFR BLD: 10.6 % (ref ?–5.8)
INT CON NEG: NEGATIVE
INT CON POS: POSITIVE

## 2017-08-24 PROCEDURE — 99214 OFFICE O/P EST MOD 30 MIN: CPT | Performed by: FAMILY MEDICINE

## 2017-08-24 PROCEDURE — 83036 HEMOGLOBIN GLYCOSYLATED A1C: CPT | Performed by: FAMILY MEDICINE

## 2017-08-24 RX ORDER — ATORVASTATIN CALCIUM 20 MG/1
20 TABLET, FILM COATED ORAL DAILY
Qty: 30 TAB | Refills: 3 | Status: SHIPPED | OUTPATIENT
Start: 2017-08-24 | End: 2017-11-14 | Stop reason: SDUPTHER

## 2017-08-24 RX ORDER — AMLODIPINE BESYLATE 10 MG/1
10 TABLET ORAL DAILY
Qty: 90 TAB | Refills: 1 | Status: SHIPPED | OUTPATIENT
Start: 2017-08-24 | End: 2017-11-14 | Stop reason: SDUPTHER

## 2017-08-24 RX ORDER — LISINOPRIL 20 MG/1
20 TABLET ORAL DAILY
Qty: 90 TAB | Refills: 3 | Status: SHIPPED | OUTPATIENT
Start: 2017-08-24 | End: 2017-11-14 | Stop reason: SDUPTHER

## 2017-08-24 RX ORDER — RANITIDINE 150 MG/1
150 TABLET ORAL 2 TIMES DAILY PRN
Qty: 60 TAB | Refills: 3 | Status: SHIPPED | OUTPATIENT
Start: 2017-08-24 | End: 2017-11-14 | Stop reason: SDUPTHER

## 2017-08-24 RX ORDER — TIOTROPIUM BROMIDE 18 UG/1
CAPSULE ORAL; RESPIRATORY (INHALATION)
Qty: 90 CAP | Refills: 0 | Status: SHIPPED | OUTPATIENT
Start: 2017-08-24 | End: 2017-11-14 | Stop reason: SDUPTHER

## 2017-08-24 ASSESSMENT — ENCOUNTER SYMPTOMS
CHILLS: 0
ABDOMINAL PAIN: 0
PALPITATIONS: 0
FEVER: 0
NAUSEA: 0
HEADACHES: 0
SPUTUM PRODUCTION: 0
SHORTNESS OF BREATH: 0
COUGH: 0
NECK PAIN: 1
BACK PAIN: 1
TINGLING: 1
EYES NEGATIVE: 1
VOMITING: 0

## 2017-08-24 NOTE — PROGRESS NOTES
Subjective:      Wagner Kenney is a 52 y.o. male who presents with No chief complaint on file.            HPI Comments: Patient here for follow-up of his diabetes, COPD, hypertension, chronic kidney disease, and hyperlipidemia.    Today he will need his medications refilled for his chronic medical conditions. He has been taking his medications as directed and is not having any issues with his medications. We'll continue to follow.    He will continue to take his blood pressure medications as directed. He is not having any chest pain or shortness of breath or other neurologic symptoms. Will have him check his blood pressures at home and keep records. ER precautions given to patient.    We'll have patient continue to take his cholesterol medication as directed. He is not having any darkening of his urine or unusual muscle aches or pains. Also discussed diet and exercise to help maintain his cholesterol and blood sugars as well as his weight. Also recommended avoiding fatty and fibrous foods and increasing his fiber intake.    We'll have him continue to take his diabetic medications as directed by his diabetic specialist. His last hemoglobin A1c was 12.8. Will check an in office A1c today. We'll also have him check his feet daily for any skin breakdown or further changes in sensation. We'll continue to follow.    He will continue to use his inhalers as directed. He has not had any recent fevers or exacerbations of his COPD. Will continue to follow.    He has been following up with his pain specialist for his chronic neck and back pain. He recently had an x-ray of his neck and the results are as follows:    1.  No acute fracture or dislocation.    2.  There is degenerative disc disease at C6-C7 and mild facet arthropathy in the lower cervical spine.    3.  No focal instability noted on flexion extension views.    We will have patient continue to take his medications as directed by pain management. Will also have  "follow-up with pain management for any procedures done by them. We'll continue to follow.     Current medications, allergies, and problem list reviewed with patient and updated in EPIC.        Review of Systems   Constitutional: Negative for fever and chills.   HENT: Negative for hearing loss.    Eyes: Negative.    Respiratory: Negative for cough, sputum production and shortness of breath.    Cardiovascular: Negative for chest pain and palpitations.   Gastrointestinal: Negative for nausea, vomiting and abdominal pain.   Musculoskeletal: Positive for back pain, joint pain and neck pain.   Neurological: Positive for tingling. Negative for headaches.          Objective:     Filed Vitals:    08/24/17 1301   BP: 122/84   Pulse: 80   Temp: 36.5 °C (97.7 °F)   Resp: 16   Height: 1.956 m (6' 5.01\")   Weight: 106.595 kg (235 lb)   SpO2: 97%         Physical Exam   Constitutional: He is oriented to person, place, and time. He appears well-developed and well-nourished.   HENT:   Right Ear: External ear normal.   Left Ear: External ear normal.   Nose: Nose normal.   Mouth/Throat: Oropharynx is clear and moist.   Eyes: EOM are normal. Pupils are equal, round, and reactive to light.   Neck: Normal range of motion. No thyromegaly present.   Cardiovascular: Normal rate, regular rhythm and normal heart sounds.  Exam reveals no friction rub.    No murmur heard.  Pulmonary/Chest: Effort normal and breath sounds normal. No respiratory distress. He has no wheezes. He has no rales.   Abdominal: Soft. Bowel sounds are normal. He exhibits no distension. There is no tenderness.   Lymphadenopathy:     He has no cervical adenopathy.   Neurological: He is alert and oriented to person, place, and time.   Skin: Skin is warm and dry.   Psychiatric: He has a normal mood and affect. His behavior is normal.               Assessment/Plan:     1. Hyperlipidemia with target LDL less than 100  We will have patient continue to take his medication as " directed. Refill of Lipitor will be sent to pharmacy. He has been advised to increase the fibers in his diet, avoid fatty/fried foods. Also advised to exercise as tolerated.     - atorvastatin (LIPITOR) 20 MG Tab; Take 1 Tab by mouth every day.  Dispense: 30 Tab; Refill: 3    2. Diabetes 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, with long-term current use of insulin (CMS-HCC)  Patient will continues to take his medications as directed by his diabetic specialist. We'll check an A1c today. We'll also have him check his feet daily for skin breakdown or changes in sensation. A1c today 10.8.  - sitagliptin (JANUVIA) 25 MG Tab; Take 1 Tab by mouth every day.  Dispense: 30 Tab; Refill: 3  - POCT  A1C    3. Essential hypertension  We'll have him continue to take his blood pressure medications as directed. Refills will be sent to his pharmacy today. He has been advised to monitor blood pressure at home and keep notes. If blood pressure elevated or having symptoms of CP, SOB or neurologic changes to go to the er.    - REFERRAL TO NEPHROLOGY    4. Chronic kidney disease (CKD), stage III (moderate)  Referral to nephrology has been made for patient today. We'll continue to follow.  - REFERRAL TO NEPHROLOGY    5. Pulmonary emphysema, unspecified emphysema type (CMS-HCC)  We'll have him continue to take his inhalers as directed. Refills will be sent to the pharmacy today. We'll continue to follow.

## 2017-08-24 NOTE — MR AVS SNAPSHOT
"        Wagner Kenney   2017 12:50 PM   Office Visit   MRN: [id number]    Department:  [facility]   Dept Phone:  [phone]    Description:  Male : 1965   Provider:  Eleuterio Lara M.D.           Reason for Visit     Diabetes     Medical Advice           Allergies as of 2017     Allergen Noted Reactions    Apple 2013   Swelling    Per patient: swelling of mouth and jittery feeling.  Apple allergy to raw apples; apple juice and applesauce okay per patient    Asa [Aspirin] 2009       \"funny taste in my mouth. My stomach has an 'empty' feeling.\"  \"throat closing\"    Metformin 2010       Pt states he \"cramps up\"      You were diagnosed with     Hyperlipidemia with target LDL less than 100   [259963]       Diabetes 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, with long-term current use of insulin (CMS-HCC)   [3095847]       Essential hypertension   [8758898]       Chronic kidney disease (CKD), stage III (moderate)   [893994]       Pulmonary emphysema, unspecified emphysema type (CMS-HCC)   [8256814]         Vital Signs     Blood Pressure Pulse Temperature Respirations Height Weight    122/84 mmHg 80 36.5 °C (97.7 °F) 16 1.956 m (6' 5.01\") 106.595 kg (235 lb)    Body Mass Index Oxygen Saturation Smoking Status             27.86 kg/m2 97% Current Every Day Smoker         Basic Information     Date Of Birth Sex Race Ethnicity Preferred Language    1965 Male Black or  Non- English      Your appointments     Sep 05, 2017  4:50 PM   New Patient with Chato Mars PA-C   [facility] ([facility])    [address], [address]  [place] [address] [phone]           Please bring Photo ID, Insurance Cards, All Medication Bottles and copies of any legal documents (such as Living Will, Power of ) If speaking a language besides English please bring an adult . Please arrive 30 minutes " prior for check in and registration. You will be receiving a confirmation call a few days before your appointment from our automated call confirmation system.            Sep 27, 2017  1:00 PM   Follow Up Visit with Won Atwood M.D.   [facility] (--)    [address]  [place] [id number] [phone]           You will be receiving a confirmation call a few days before your appointment from our automated call confirmation system.            Oct 05, 2017 12:50 PM   Established Patient with Eleuterio Lara M.D.   [facility] (Healthcare Center)    [address]  [place] [id number] [phone]           You will be receiving a confirmation call a few days before your appointment from our automated call confirmation system.              Problem List              ICD-10-CM Priority Class Noted - Resolved    GERD (gastroesophageal reflux disease) K21.9   7/15/2009 - Present    HTN (hypertension) I10 Medium  7/15/2009 - Present    Chronic low back pain M54.5, G89.29   7/15/2009 - Present    COPD (chronic obstructive pulmonary disease) (CMS-HCC) J44.9   3/31/2010 - Present    ANABELA (obstructive sleep apnea) G47.33   4/14/2010 - Present    Right-sided heart failure (CMS-HCC) I50.9   5/17/2010 - Present    Hand pain M79.643   8/30/2010 - Present    Foot pain M79.673   8/30/2010 - Present    Diabetes    4/18/2011 - Present    DIABETES MELLITUS    11/8/2011 - Present    CKD (chronic kidney disease) N18.9   1/13/2012 - Present    HTN (hypertension) I10   1/13/2012 - Present    Hyperlipidemia with target LDL less than 100 E78.5 Low  1/13/2012 - Present    DM (diabetes mellitus) (CMS-HCC) E11.9   7/25/2012 - Present    Chronic kidney disease (CKD), stage III (moderate) N18.3 Medium  7/26/2012 - Present    Renal cyst N28.1   7/26/2012 - Present    Asthma exacerbation J45.901 High  10/16/2012 - Present    DIABETES MELLITUS    10/17/2012 - Present    COPD exacerbation (CMS-HCC) J44.1  Medium  4/4/2013 - Present    Hypertriglyceridemia E78.1   6/13/2013 - Present    Hypercalcemia E83.52   7/30/2013 - Present    Vitamin d deficiency    7/30/2013 - Present    Back pain M54.9   7/17/2015 - Present    DKA (diabetic ketoacidoses) (CMS-HCC) E13.10   7/17/2015 - Present    Tobacco abuse Z72.0 Low  7/17/2015 - Present    Increased anion gap metabolic acidosis E87.2   7/17/2015 - Present    Sepsis (CMS-HCC) A41.9 High  7/22/2015 - Present    CAP (community acquired pneumonia) J18.9 High  7/22/2015 - Present    DM (diabetes mellitus) (CMS-HCC) E11.9 Medium  7/23/2015 - Present    Cocaine use F14.10   9/30/2015 - Present    Hyperlipidemia associated with type 2 diabetes mellitus (CMS-HCC) E11.69, E78.5   10/6/2015 - Present    Type 2 diabetes mellitus with hyperglycemia (CMS-HCC) E11.65 Medium  2/13/2016 - Present    Type 2 diabetes mellitus with neurologic complication (CMS-HCC) E11.49 Medium  2/13/2016 - Present    Acute on chronic respiratory failure with hypoxia (CMS-HCC) J96.21 High  2/13/2016 - Present    COPD with acute exacerbation (CMS-HCC) J44.1 High  2/13/2016 - Present    Viral sepsis (CMS-HCC) A41.89, B97.89 High  2/13/2016 - Present    Influenza A J10.1 High  2/13/2016 - Present    Osteoarthritis of spine with radiculopathy, cervical region M47.22   9/9/2016 - Present    DDD (degenerative disc disease), cervical M50.30   9/9/2016 - Present    Cervical radiculopathy M54.12   9/9/2016 - Present    Chronic neck pain M54.2, G89.29   9/9/2016 - Present    Type 2 diabetes mellitus with diabetic neuropathy, with long-term current use of insulin (CMS-HCC) E11.40, Z79.4   6/12/2017 - Present      Health Maintenance        Date Due Completion Dates    IMM HEP B VACCINE (1 of 3 - Primary Series) 1965 ---    IMM PNEUMOCOCCAL 19-64 (ADULT) MEDIUM RISK SERIES (1 of 1 - PPSV23) 3/18/1986 ---    COLONOSCOPY 3/18/2017 ---    IMM INFLUENZA (1) 9/1/2017 2/13/2016, 10/22/2014, 10/14/2013, 10/18/2012,  11/8/2011, 11/9/2010, 12/16/2009    A1C SCREENING 1/20/2018 7/20/2017, 7/10/2017, 4/13/2017, 11/7/2016, 5/25/2016, 2/11/2016, 12/2/2015, 7/17/2015, 2/21/2014, 7/24/2013, 4/4/2013, 10/17/2012, 7/25/2012, 8/3/2011, 10/28/2010, 6/28/2010, 4/20/2010, 1/5/2009, 6/30/2008, 12/10/2006, 10/19/2005    DIABETES MONOFILAMENT / LE EXAM 4/5/2018 4/5/2017, 3/31/2015 (Done), 10/14/2013 (N/S)    Override on 3/31/2015: Done    Override on 10/14/2013: (N/S)    FASTING LIPID PROFILE 4/13/2018 4/13/2017, 11/7/2016, 5/25/2016, 2/12/2016, 12/2/2015, 2/21/2014, 7/24/2013, 7/25/2012, 8/3/2011, 4/20/2010, 6/9/2009, 6/30/2008    RETINAL SCREENING 4/14/2018 4/14/2017, 4/14/2016    URINE ACR / MICROALBUMIN 7/10/2018 7/10/2017, 4/13/2017, 11/7/2016, 5/25/2016, 12/2/2015, 7/24/2013, 7/25/2012, 10/28/2010    SERUM CREATININE 7/10/2018 7/10/2017, 4/13/2017, 3/12/2017, 12/9/2016, 11/7/2016, 9/6/2016, 5/25/2016, 2/13/2016, 2/12/2016, 2/11/2016, 12/2/2015, 9/5/2015, 7/26/2015, 7/25/2015, 7/24/2015, 7/22/2015, 7/18/2015, 7/17/2015, 7/17/2015, 2/22/2015, 1/6/2015, 7/15/2014, 7/14/2014, 7/13/2014, 5/3/2014, 11/4/2013, 7/24/2013, 5/27/2013, 4/4/2013, 1/29/2013, 10/18/2012, 10/17/2012, 10/16/2012, 8/21/2012, 7/25/2012, 8/3/2011, 12/22/2010, 4/20/2010, 6/9/2009, 9/29/2008, 6/30/2008, 12/10/2006, 12/9/2006, 10/27/2005, 10/26/2005, 10/25/2005, 10/25/2005, 10/25/2005, 10/18/2005, 10/18/2005    IMM DTaP/Tdap/Td Vaccine (2 - Td) 5/11/2027 5/11/2017            Current Immunizations     Influenza TIV (IM) 10/14/2013, 10/18/2012 10:41 AM, 11/8/2011 10:06 AM    Influenza Vaccine Pediatric 11/9/2010, 12/16/2009    Influenza Vaccine Quad Inj (Pf) 10/22/2014  9:18 AM    Influenza Vaccine Quad Inj (Preserved) 2/13/2016 12:36 PM    Pneumococcal Vaccine (UF)Historical Data 12/23/2010    Tdap Vaccine 5/11/2017  2:31 PM      Below and/or attached are the medications your provider expects you to take. Review all of your home medications and newly ordered medications with  your provider and/or pharmacist. Follow medication instructions as directed by your provider and/or pharmacist. Please keep your medication list with you and share with your provider. Update the information when medications are discontinued, doses are changed, or new medications (including over-the-counter products) are added; and carry medication information at all times in the event of emergency situations     Allergies:  APPLE - Swelling     ASA - (reactions not documented)     METFORMIN - (reactions not documented)               Medications  Valid as of: August 24, 2017 -  1:10 PM    Generic Name Brand Name Tablet Size Instructions for use    Albuterol Sulfate (Aero Soln) albuterol 108 (90 Base) MCG/ACT Inhale 2 Puffs by mouth every 6 hours as needed for Shortness of Breath.        Albuterol Sulfate (Nebu Soln) PROVENTIL 2.5mg/3ml         AmLODIPine Besylate (Tab) NORVASC 10 MG Take 1 Tab by mouth every day.        Atorvastatin Calcium (Tab) LIPITOR 20 MG Take 1 Tab by mouth every day.        Budesonide-Formoterol Fumarate (Aerosol) SYMBICORT 160-4.5 MCG/ACT INHALE 2 PUFFS BY MOUTH 2 TIMES A DAY.        Bupivacaine HCl (Solution) SENSORCAINE-MARCAINE 0.25 %         Cyclobenzaprine HCl (Tab) FLEXERIL 10 MG Take 1/2  to 1 tablet by mouth twice per day as for muscle spasm        Gabapentin (Cap) NEURONTIN 100 MG Take 1 to 2 tablets by mouth three times per day as needed for nerve pain        Glucose Blood (Strip) PRECISION XTRA TEST STRIPS  Use as directed        Insulin Aspart (Solution Pen-injector) NOVOLOG 100 UNIT/ML Inject 5-15 Units as instructed 3 times a day before meals.        Insulin Glargine (Solution Pen-injector) LANTUS SOLOSTAR 100 UNIT/ML Inject 46 Units as instructed every evening.        Insulin Pen Needle (Misc) NOVOFINE 32G X 6 MM Use as directed with insulin 4 times daily        Lancets (Misc) TECHLITE LANCETS  Use as directed        Lisinopril (Tab) PRINIVIL 20 MG Take 1 Tab by mouth every  day.        Misc. Devices (Misc) Misc. Devices  Precision xtra glucometer and test strips and lancets to check his blood sugars 4 times daily #120        Misc. Devices (Misc) Misc. Devices  Diabetic shoes to use as directed for B near ulcerating callouses on plantar surfaces of feet        Ondansetron (TABLET DISPERSIBLE) ZOFRAN ODT 4 MG         RaNITidine HCl (Tab) ZANTAC 150 MG Take 1 Tab by mouth 2 times a day as needed for Heartburn.        SITagliptin Phosphate (Tab) JANUVIA 25 MG Take 1 Tab by mouth every day.        Tetanus-Diphth-Acell Pertussis (Suspension) ADACEL 5-2-15.5 LF-MCG/0.5         Tiotropium Bromide Monohydrate (Cap) SPIRIVA 18 MCG INHALE 1 CAP BY MOUTH 1 TIME DAILY AS NEEDED (SHORTNESS OF BREATH).        .                 Medicines prescribed today were sent to:     [facility] [address] [address]    Phone: [phone] Fax: [phone]    Open 24 Hours?: No      Medication refill instructions:       If your prescription bottle indicates you have medication refills left, it is not necessary to call your provider’s office. Please contact your pharmacy and they will refill your medication.    If your prescription bottle indicates you do not have any refills left, you may request refills at any time through one of the following ways: The online [employer] system (except Urgent Care), by calling your provider’s office, or by asking your pharmacy to contact your provider’s office with a refill request. Medication refills are processed only during regular business hours and may not be available until the next business day. Your provider may request additional information or to have a follow-up visit with you prior to refilling your medication.   *Please Note: Medication refills are assigned a new Rx number when refilled electronically. Your pharmacy may indicate that no refills were authorized even though a new prescription for the same medication is  available at the pharmacy. Please request the medicine by name with the pharmacy before contacting your provider for a refill.        Referral     A referral request has been sent to our patient care coordination department. Please allow 3-5 business days for us to process this request and contact you either by phone or mail. If you do not hear from us by the 5th business day, please call us at [phone].           Carefx Access Code: [email or web address]  Expires: 9/23/2017  1:10 PM    Carefx  A secure, online tool to manage your health information     [employer]’s Carefx® is a secure, online tool that connects you to your personalized health information from the privacy of your home -- day or night - making it very easy for you to manage your healthcare. Once the activation process is completed, you can even access your medical information using the Carefx urvashi, which is available for free in the Apple Urvashi store or Google Play store.     Carefx provides the following levels of access (as shown below):   My Chart Features   [facility] Doctor [facility]  Specialists [facility]  Urgent  Care [facility]  Primary Care  Doctor   Email your healthcare team securely and privately 24/7 X X X    Manage appointments: schedule your next appointment; view details of past/upcoming appointments X      Request prescription refills. X      View recent personal medical records, including lab and immunizations X X X X   View health record, including health history, allergies, medications X X X X   Read reports about your outpatient visits, procedures, consult and ER notes X X X X   See your discharge summary, which is a recap of your hospital and/or ER visit that includes your diagnosis, lab results, and care plan. X X       How to register for Carefx:  1. Go to  [email or web address].  2. Click on the Sign Up Now box, which takes you to the New Member Sign Up page. You will need to provide the following  information:  a. Enter your Perfect Price Access Code exactly as it appears at the top of this page. (You will not need to use this code after you’ve completed the sign-up process. If you do not sign up before the expiration date, you must request a new code.)   b. Enter your date of birth.   c. Enter your home email address.   d. Click Submit, and follow the next screen’s instructions.  3. Create a Perfect Price ID. This will be your Perfect Price login ID and cannot be changed, so think of one that is secure and easy to remember.  4. Create a Instinctivt password. You can change your password at any time.  5. Enter your Password Reset Question and Answer. This can be used at a later time if you forget your password.   6. Enter your e-mail address. This allows you to receive e-mail notifications when new information is available in Perfect Price.  7. Click Sign Up. You can now view your health information.    For assistance activating your Perfect Price account, call [phone]        Quit Tobacco Information     Do you want to quit using tobacco?    Quitting tobacco decreases risks of cancer, heart and lung disease, increases life expectancy, improves sense of taste and smell, and increases spending money, among other benefits.    If you are thinking about quitting, we can help.  • [facility] Quit Tobacco Program: [phone]  o Program occurs weekly for four weeks and includes pharmacist consultation on products to support quitting smoking or chewing tobacco. A provider referral is needed for pharmacist consultation.  • Tobacco Users Help Hotline: 8-800-QUIT-NOW (580-7931) or https://[place].quitlogix.org/  o Free, confidential telephone and online coaching for [place] residents. Sessions are designed on a schedule that is convenient for you. Eligible clients receive free nicotine replacement therapy.  • Nationally: www.smokefree.gov  o Information and professional assistance to support both immediate and long-term needs as you become, and remain,  a non-smoker. Smokefree.gov allows you to choose the help that best fits your needs.

## 2017-09-05 ENCOUNTER — APPOINTMENT (OUTPATIENT)
Dept: ENDOCRINOLOGY | Facility: MEDICAL CENTER | Age: 52
End: 2017-09-05

## 2017-10-03 RX ORDER — RANITIDINE 300 MG/1
TABLET ORAL
Qty: 180 TAB | Refills: 0 | Status: SHIPPED | OUTPATIENT
Start: 2017-10-03 | End: 2017-11-14

## 2017-10-30 ENCOUNTER — APPOINTMENT (OUTPATIENT)
Dept: ENDOCRINOLOGY | Facility: MEDICAL CENTER | Age: 52
End: 2017-10-30

## 2017-11-14 ENCOUNTER — OFFICE VISIT (OUTPATIENT)
Dept: MEDICAL GROUP | Facility: MEDICAL CENTER | Age: 52
End: 2017-11-14
Attending: FAMILY MEDICINE
Payer: MEDICAID

## 2017-11-14 VITALS
BODY MASS INDEX: 27.16 KG/M2 | HEART RATE: 96 BPM | TEMPERATURE: 98.7 F | DIASTOLIC BLOOD PRESSURE: 78 MMHG | WEIGHT: 223 LBS | OXYGEN SATURATION: 95 % | RESPIRATION RATE: 16 BRPM | HEIGHT: 76 IN | SYSTOLIC BLOOD PRESSURE: 125 MMHG

## 2017-11-14 DIAGNOSIS — Z79.4 DIABETES MELLITUS DUE TO UNDERLYING CONDITION WITH DIABETIC NEPHROPATHY, WITH LONG-TERM CURRENT USE OF INSULIN (HCC): ICD-10-CM

## 2017-11-14 DIAGNOSIS — J43.9 PULMONARY EMPHYSEMA, UNSPECIFIED EMPHYSEMA TYPE (HCC): ICD-10-CM

## 2017-11-14 DIAGNOSIS — Z23 NEED FOR INFLUENZA VACCINATION: ICD-10-CM

## 2017-11-14 DIAGNOSIS — E78.5 HYPERLIPIDEMIA WITH TARGET LDL LESS THAN 100: ICD-10-CM

## 2017-11-14 DIAGNOSIS — Z23 NEED FOR PROPHYLACTIC VACCINATION WITH STREPTOCOCCUS PNEUMONIAE (PNEUMOCOCCUS) AND INFLUENZA VACCINES: ICD-10-CM

## 2017-11-14 DIAGNOSIS — E08.21 DIABETES MELLITUS DUE TO UNDERLYING CONDITION WITH DIABETIC NEPHROPATHY, WITH LONG-TERM CURRENT USE OF INSULIN (HCC): ICD-10-CM

## 2017-11-14 DIAGNOSIS — E11.42 DIABETIC PERIPHERAL NEUROPATHY (HCC): ICD-10-CM

## 2017-11-14 PROCEDURE — 90732 PPSV23 VACC 2 YRS+ SUBQ/IM: CPT | Performed by: FAMILY MEDICINE

## 2017-11-14 PROCEDURE — 90471 IMMUNIZATION ADMIN: CPT | Performed by: FAMILY MEDICINE

## 2017-11-14 PROCEDURE — 99213 OFFICE O/P EST LOW 20 MIN: CPT | Mod: 25 | Performed by: FAMILY MEDICINE

## 2017-11-14 PROCEDURE — 99214 OFFICE O/P EST MOD 30 MIN: CPT | Mod: 25 | Performed by: FAMILY MEDICINE

## 2017-11-14 PROCEDURE — 90686 IIV4 VACC NO PRSV 0.5 ML IM: CPT | Performed by: FAMILY MEDICINE

## 2017-11-14 RX ORDER — LISINOPRIL 20 MG/1
20 TABLET ORAL DAILY
Qty: 90 TAB | Refills: 3 | Status: SHIPPED | OUTPATIENT
Start: 2017-11-14 | End: 2017-12-26

## 2017-11-14 RX ORDER — BUDESONIDE AND FORMOTEROL FUMARATE DIHYDRATE 160; 4.5 UG/1; UG/1
AEROSOL RESPIRATORY (INHALATION)
Qty: 1 INHALER | Refills: 11 | Status: SHIPPED | OUTPATIENT
Start: 2017-11-14 | End: 2018-01-09 | Stop reason: SDUPTHER

## 2017-11-14 RX ORDER — AMLODIPINE BESYLATE 10 MG/1
10 TABLET ORAL DAILY
Qty: 90 TAB | Refills: 1 | Status: SHIPPED | OUTPATIENT
Start: 2017-11-14

## 2017-11-14 RX ORDER — TIOTROPIUM BROMIDE 18 UG/1
CAPSULE ORAL; RESPIRATORY (INHALATION)
Qty: 90 CAP | Refills: 0 | Status: SHIPPED | OUTPATIENT
Start: 2017-11-14 | End: 2017-12-22 | Stop reason: SDUPTHER

## 2017-11-14 RX ORDER — BLOOD SUGAR DIAGNOSTIC
STRIP MISCELLANEOUS
Qty: 120 STRIP | Refills: 11 | Status: SHIPPED | OUTPATIENT
Start: 2017-11-14 | End: 2018-02-20

## 2017-11-14 RX ORDER — RANITIDINE 150 MG/1
150 TABLET ORAL 2 TIMES DAILY PRN
Qty: 60 TAB | Refills: 3 | Status: SHIPPED | OUTPATIENT
Start: 2017-11-14 | End: 2018-04-04 | Stop reason: SDUPTHER

## 2017-11-14 RX ORDER — ATORVASTATIN CALCIUM 20 MG/1
20 TABLET, FILM COATED ORAL DAILY
Qty: 30 TAB | Refills: 3 | Status: SHIPPED | OUTPATIENT
Start: 2017-11-14 | End: 2018-07-12 | Stop reason: SDUPTHER

## 2017-11-14 RX ORDER — ALBUTEROL SULFATE 90 UG/1
2 AEROSOL, METERED RESPIRATORY (INHALATION) EVERY 6 HOURS PRN
Qty: 8.5 G | Refills: 6 | Status: SHIPPED | OUTPATIENT
Start: 2017-11-14 | End: 2018-01-09 | Stop reason: SDUPTHER

## 2017-11-14 RX ORDER — INSULIN GLARGINE 100 [IU]/ML
46 INJECTION, SOLUTION SUBCUTANEOUS EVERY EVENING
Qty: 10 PEN | Refills: 11 | Status: SHIPPED | OUTPATIENT
Start: 2017-11-14 | End: 2018-02-02

## 2017-11-14 RX ORDER — CYCLOBENZAPRINE HCL 10 MG
TABLET ORAL
Qty: 60 TAB | Refills: 2 | Status: SHIPPED | OUTPATIENT
Start: 2017-11-14 | End: 2018-02-20

## 2017-11-14 RX ORDER — GABAPENTIN 100 MG/1
CAPSULE ORAL
Qty: 90 CAP | Refills: 2 | Status: SHIPPED | OUTPATIENT
Start: 2017-11-14 | End: 2018-02-20

## 2017-11-14 RX ORDER — LANCETS
EACH MISCELLANEOUS
Qty: 120 EACH | Refills: 11 | Status: SHIPPED | OUTPATIENT
Start: 2017-11-14 | End: 2018-02-20

## 2017-11-14 RX ORDER — ONDANSETRON 4 MG/1
4 TABLET, ORALLY DISINTEGRATING ORAL EVERY 8 HOURS PRN
Qty: 10 TAB | Refills: 3 | Status: SHIPPED | OUTPATIENT
Start: 2017-11-14 | End: 2018-02-20

## 2017-11-14 ASSESSMENT — ENCOUNTER SYMPTOMS
CHILLS: 0
PSYCHIATRIC NEGATIVE: 1
VOMITING: 0
HEADACHES: 0
SPUTUM PRODUCTION: 0
FEVER: 0
NAUSEA: 0
FOCAL WEAKNESS: 0
SHORTNESS OF BREATH: 0
PALPITATIONS: 0
SENSORY CHANGE: 0
ABDOMINAL PAIN: 0
TREMORS: 0
COUGH: 0
TINGLING: 1
SPEECH CHANGE: 0
EYES NEGATIVE: 1

## 2017-11-14 ASSESSMENT — PAIN SCALES - GENERAL: PAINLEVEL: 7=MODERATE-SEVERE PAIN

## 2017-11-15 NOTE — PROGRESS NOTES
Subjective:      Wagner Kenney is a 52 y.o. male who presents with Follow-Up (a1c1) and Medication Refill            Patient here for follow-up of his diabetes, hypertension, hyperlipidemia, COPD and neuropathy.    His A1c in clinic today was 10.5. Will continue to use his medications as directed. Refills of his insulin and diabetic medications will be sent to his pharmacy to be refilled. He has not been having any issues with taking his medications, and will have him check his blood sugars several times daily and keep notes. We'll also reorder a microalbumin creatinine ratio, complete metabolic panel and A1c. We'll also have him check his feet daily for any skin breakdown or changes in sensation. He has also been reminded to follow-up with an ophthalmologist for diabetic retinal exam.    His blood pressure appears to be well managed with his current blood pressure medications. He is not having any chest pain, shortness of breath or other neurologic changes. Will have him check his blood pressures at home and keep records. We'll continue to follow.    We'll have continue to take his medications for his neuropathy as directed. He feels that the medications are helping to control his neuropathy. We'll continue to follow.    We'll have him take his cholesterol medications as directed. He is not having any darkening of his urine or unusual muscle aches or pains. We'll recheck his lipid panel. I will have him continue to watch his diet avoiding fatty and fried foods and increasing his fiber intake. He will also exercise as tolerated at least 4-5 times weekly for 20-30 minutes at a time.    He'll also continue to follow-up with his nephrologist for his chronic kidney disease. Will have patient continue to take his medications as directed. Will continue to follow.     Current medications, allergies, and problem list reviewed with patient and updated in EPIC.          Review of Systems   Constitutional: Negative for chills  "and fever.   HENT: Negative for hearing loss.    Eyes: Negative.    Respiratory: Negative for cough, sputum production and shortness of breath.    Cardiovascular: Negative for chest pain and palpitations.   Gastrointestinal: Negative for abdominal pain, nausea and vomiting.   Neurological: Positive for tingling. Negative for tremors, sensory change, speech change, focal weakness and headaches.   Psychiatric/Behavioral: Negative.           Objective:     /78   Pulse 96   Temp 37.1 °C (98.7 °F)   Resp 16   Ht 1.93 m (6' 4\")   Wt 101.2 kg (223 lb)   SpO2 95%   BMI 27.14 kg/m²      Physical Exam   Constitutional: He is oriented to person, place, and time. He appears well-developed and well-nourished.   HENT:   Head: Normocephalic and atraumatic.   Cardiovascular: Normal rate, regular rhythm and normal heart sounds.  Exam reveals no friction rub.    No murmur heard.  Pulmonary/Chest: Effort normal and breath sounds normal. No respiratory distress. He has no wheezes. He has no rales.   Abdominal: Soft. Bowel sounds are normal. He exhibits no distension. There is no tenderness. There is no guarding.   Neurological: He is alert and oriented to person, place, and time.   Skin: Skin is warm and dry.   Nursing note and vitals reviewed.              Assessment/Plan:     1. Hyperlipidemia with target LDL less than 100  We'll have patient continues take his Lipitor as directed. Will send refills to pharmacy. He has been advised to increase the fibers in his diet, avoid fatty/fried foods and try fish oil supplements if not allergic to seafood. Also advised to exercise as tolerated.     - atorvastatin (LIPITOR) 20 MG Tab; Take 1 Tab by mouth every day.  Dispense: 30 Tab; Refill: 3  - LIPID PROFILE; Future    2. Diabetes 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, with long-term current use of insulin (CMS-[facility])  We'll have patient continue to take his medication as directed. Refills will be sent to " his pharmacy. He'll continue to check his blood sugars daily and keep notes. He'll also check his feet daily for any skin breakdown or changes in sensation.  - sitagliptin (JANUVIA) 25 MG Tab; Take 1 Tab by mouth every day.  Dispense: 30 Tab; Refill: 3  - LANTUS SOLOSTAR 100 UNIT/ML Solution Pen-injector injection; Inject 46 Units as instructed every evening.  Dispense: 10 PEN; Refill: 11  - COMP METABOLIC PANEL; Future  - LIPID PROFILE; Future  - CBC WITH DIFFERENTIAL; Future  - HEMOGLOBIN A1C; Future  - MICROALBUMIN CREAT RATIO URINE; Future    3. Pulmonary emphysema, unspecified emphysema type (CMS-HCC)  We'll have patient continue to use his inhalers as directed. We'll continue to follow.  - albuterol 108 (90 Base) MCG/ACT Aero Soln inhalation aerosol; Inhale 2 Puffs by mouth every 6 hours as needed for Shortness of Breath.  Dispense: 8.5 g; Refill: 6    4. Diabetic peripheral neuropathy (CMS-HCC)  Patient continues take his medication as directed. Refills will be sent to us pharmacy. We'll continue to follow.  - gabapentin (NEURONTIN) 100 MG Cap; Take 1 to 2 tablets by mouth three times per day as needed for nerve pain  Dispense: 90 Cap; Refill: 2    5. Need for prophylactic vaccination with Streptococcus pneumoniae (Pneumococcus) and Influenza vaccines  He would like to get a flu and pneumonia shot, he  understands the risks and benefits of the flu shot. No recent fevers, no egg allergies, and no hx of GBS.    - Flu Quad Inj >3 Year Pre-Filled PF  - PneumoVax PPV23 =>1yo

## 2017-11-16 ENCOUNTER — HOSPITAL ENCOUNTER (OUTPATIENT)
Dept: LAB | Facility: MEDICAL CENTER | Age: 52
End: 2017-11-16
Attending: FAMILY MEDICINE
Payer: MEDICAID

## 2017-11-16 DIAGNOSIS — E78.5 HYPERLIPIDEMIA WITH TARGET LDL LESS THAN 100: ICD-10-CM

## 2017-11-16 DIAGNOSIS — E08.21 DIABETES MELLITUS DUE TO UNDERLYING CONDITION WITH DIABETIC NEPHROPATHY, WITH LONG-TERM CURRENT USE OF INSULIN (HCC): ICD-10-CM

## 2017-11-16 DIAGNOSIS — Z79.4 DIABETES MELLITUS DUE TO UNDERLYING CONDITION WITH DIABETIC NEPHROPATHY, WITH LONG-TERM CURRENT USE OF INSULIN (HCC): ICD-10-CM

## 2017-11-16 LAB
ALBUMIN SERPL BCP-MCNC: 4.2 G/DL (ref 3.2–4.9)
ALBUMIN/GLOB SERPL: 1.4 G/DL
ALP SERPL-CCNC: 88 U/L (ref 30–99)
ALT SERPL-CCNC: 47 U/L (ref 2–50)
ANION GAP SERPL CALC-SCNC: 8 MMOL/L (ref 0–11.9)
AST SERPL-CCNC: 20 U/L (ref 12–45)
BASOPHILS # BLD AUTO: 0.3 % (ref 0–1.8)
BASOPHILS # BLD: 0.02 K/UL (ref 0–0.12)
BILIRUB SERPL-MCNC: 0.6 MG/DL (ref 0.1–1.5)
BUN SERPL-MCNC: 21 MG/DL (ref 8–22)
CALCIUM SERPL-MCNC: 9.8 MG/DL (ref 8.5–10.5)
CHLORIDE SERPL-SCNC: 103 MMOL/L (ref 96–112)
CHOLEST SERPL-MCNC: 210 MG/DL (ref 100–199)
CO2 SERPL-SCNC: 25 MMOL/L (ref 20–33)
CREAT SERPL-MCNC: 1.15 MG/DL (ref 0.5–1.4)
CREAT UR-MCNC: 180.8 MG/DL
EOSINOPHIL # BLD AUTO: 0.14 K/UL (ref 0–0.51)
EOSINOPHIL NFR BLD: 1.9 % (ref 0–6.9)
ERYTHROCYTE [DISTWIDTH] IN BLOOD BY AUTOMATED COUNT: 40.8 FL (ref 35.9–50)
EST. AVERAGE GLUCOSE BLD GHB EST-MCNC: 280 MG/DL
GFR SERPL CREATININE-BSD FRML MDRD: >60 ML/MIN/1.73 M 2
GLOBULIN SER CALC-MCNC: 2.9 G/DL (ref 1.9–3.5)
GLUCOSE SERPL-MCNC: 293 MG/DL (ref 65–99)
HBA1C MFR BLD: 11.4 % (ref 0–5.6)
HCT VFR BLD AUTO: 50.9 % (ref 42–52)
HDLC SERPL-MCNC: 63 MG/DL
HGB BLD-MCNC: 17.1 G/DL (ref 14–18)
IMM GRANULOCYTES # BLD AUTO: 0.03 K/UL (ref 0–0.11)
IMM GRANULOCYTES NFR BLD AUTO: 0.4 % (ref 0–0.9)
LDLC SERPL CALC-MCNC: 120 MG/DL
LYMPHOCYTES # BLD AUTO: 2.02 K/UL (ref 1–4.8)
LYMPHOCYTES NFR BLD: 27.1 % (ref 22–41)
MCH RBC QN AUTO: 28.9 PG (ref 27–33)
MCHC RBC AUTO-ENTMCNC: 33.6 G/DL (ref 33.7–35.3)
MCV RBC AUTO: 86.1 FL (ref 81.4–97.8)
MICROALBUMIN UR-MCNC: 92.4 MG/DL
MICROALBUMIN/CREAT UR: 511 MG/G (ref 0–30)
MONOCYTES # BLD AUTO: 0.55 K/UL (ref 0–0.85)
MONOCYTES NFR BLD AUTO: 7.4 % (ref 0–13.4)
NEUTROPHILS # BLD AUTO: 4.7 K/UL (ref 1.82–7.42)
NEUTROPHILS NFR BLD: 62.9 % (ref 44–72)
NRBC # BLD AUTO: 0 K/UL
NRBC BLD AUTO-RTO: 0 /100 WBC
PLATELET # BLD AUTO: 227 K/UL (ref 164–446)
PMV BLD AUTO: 11.2 FL (ref 9–12.9)
POTASSIUM SERPL-SCNC: 4.3 MMOL/L (ref 3.6–5.5)
PROT SERPL-MCNC: 7.1 G/DL (ref 6–8.2)
RBC # BLD AUTO: 5.91 M/UL (ref 4.7–6.1)
SODIUM SERPL-SCNC: 136 MMOL/L (ref 135–145)
TRIGL SERPL-MCNC: 134 MG/DL (ref 0–149)
WBC # BLD AUTO: 7.5 K/UL (ref 4.8–10.8)

## 2017-11-16 PROCEDURE — 85025 COMPLETE CBC W/AUTO DIFF WBC: CPT

## 2017-11-16 PROCEDURE — 82043 UR ALBUMIN QUANTITATIVE: CPT

## 2017-11-16 PROCEDURE — 80053 COMPREHEN METABOLIC PANEL: CPT

## 2017-11-16 PROCEDURE — 80061 LIPID PANEL: CPT

## 2017-11-16 PROCEDURE — 82570 ASSAY OF URINE CREATININE: CPT

## 2017-11-16 PROCEDURE — 36415 COLL VENOUS BLD VENIPUNCTURE: CPT

## 2017-11-16 PROCEDURE — 83036 HEMOGLOBIN GLYCOSYLATED A1C: CPT

## 2017-12-26 ENCOUNTER — OFFICE VISIT (OUTPATIENT)
Dept: MEDICAL GROUP | Facility: MEDICAL CENTER | Age: 52
End: 2017-12-26
Attending: FAMILY MEDICINE
Payer: MEDICAID

## 2017-12-26 VITALS
HEIGHT: 77 IN | OXYGEN SATURATION: 96 % | WEIGHT: 237 LBS | SYSTOLIC BLOOD PRESSURE: 140 MMHG | TEMPERATURE: 98 F | RESPIRATION RATE: 14 BRPM | DIASTOLIC BLOOD PRESSURE: 100 MMHG | BODY MASS INDEX: 27.98 KG/M2 | HEART RATE: 82 BPM

## 2017-12-26 DIAGNOSIS — E11.40 TYPE 2 DIABETES MELLITUS WITH DIABETIC NEUROPATHY, WITH LONG-TERM CURRENT USE OF INSULIN (HCC): ICD-10-CM

## 2017-12-26 DIAGNOSIS — E08.21 DIABETES MELLITUS DUE TO UNDERLYING CONDITION WITH DIABETIC NEPHROPATHY, WITHOUT LONG-TERM CURRENT USE OF INSULIN (HCC): ICD-10-CM

## 2017-12-26 DIAGNOSIS — Z79.4 TYPE 2 DIABETES MELLITUS WITH DIABETIC NEUROPATHY, WITH LONG-TERM CURRENT USE OF INSULIN (HCC): ICD-10-CM

## 2017-12-26 DIAGNOSIS — E11.69 HYPERLIPIDEMIA ASSOCIATED WITH TYPE 2 DIABETES MELLITUS (HCC): ICD-10-CM

## 2017-12-26 DIAGNOSIS — E78.5 HYPERLIPIDEMIA ASSOCIATED WITH TYPE 2 DIABETES MELLITUS (HCC): ICD-10-CM

## 2017-12-26 DIAGNOSIS — I10 ESSENTIAL HYPERTENSION: ICD-10-CM

## 2017-12-26 LAB
HBA1C MFR BLD: 13.3 % (ref ?–5.8)
INT CON NEG: NEGATIVE
INT CON POS: POSITIVE

## 2017-12-26 PROCEDURE — 83036 HEMOGLOBIN GLYCOSYLATED A1C: CPT | Performed by: FAMILY MEDICINE

## 2017-12-26 PROCEDURE — 99214 OFFICE O/P EST MOD 30 MIN: CPT | Performed by: FAMILY MEDICINE

## 2017-12-26 PROCEDURE — 99213 OFFICE O/P EST LOW 20 MIN: CPT | Performed by: FAMILY MEDICINE

## 2017-12-26 RX ORDER — LISINOPRIL 40 MG/1
40 TABLET ORAL DAILY
Qty: 30 TAB | Refills: 3 | Status: SHIPPED | OUTPATIENT
Start: 2017-12-26 | End: 2018-05-08 | Stop reason: SDUPTHER

## 2017-12-26 RX ORDER — TIOTROPIUM BROMIDE 18 UG/1
CAPSULE ORAL; RESPIRATORY (INHALATION)
Qty: 3 CAP | Refills: 2 | Status: SHIPPED | OUTPATIENT
Start: 2017-12-26 | End: 2018-06-14

## 2017-12-26 ASSESSMENT — ENCOUNTER SYMPTOMS
BACK PAIN: 1
COUGH: 0
VOMITING: 0
FEVER: 0
TINGLING: 1
NAUSEA: 0
PALPITATIONS: 0
CHILLS: 0
ABDOMINAL PAIN: 0
SPUTUM PRODUCTION: 0
SHORTNESS OF BREATH: 0

## 2017-12-26 NOTE — TELEPHONE ENCOUNTER
Was the patient seen in the last year in this department? Yes     Does patient have an active prescription for medications requested? Yes     Received Request Via: Pharmacy   Future Appointments       Provider Department [place]    12/26/2017 4:30 PM Eleuterio Lara M.D. [facility]

## 2017-12-27 NOTE — PROGRESS NOTES
Subjective:      Wagner Kenney is a 52 y.o. male who presents with Follow-Up and Diabetes            Patient here for follow-up of his recent blood work. He has a history of diabetes with neuropathy, diabetic nephropathy, hypertension, and hyperlipidemia.    His A1c today with a POC test was at 13.1, the results of his recent blood work are as follows:    11/16/2017 09:40  Glycohemoglobin: 11.4 (H)  Estim. Avg Glu: 280  Cholesterol,Tot: 210 (H)  Triglycerides: 134  HDL: 63  LDL: 120 (H)  Micro Alb Creat Ratio: 511 (H)  Creatinine, Urine: 180.80  Microalbumin, Urine Random: 92.4    We'll have patient continue to use his insulin as directed but will increase his Januvia to 50 mg daily. A referral to a diabetic specialist will also be made today for additional assistance in managing his poorly controlled diabetes. Will have him continue to check his blood pressure and blood sugars at home. He will record these numbers are reviewed at his next appointment.    His blood pressure today is slightly elevated, and his proteinuria increase since his last lab work. So his lisinopril will be increased to 40 mg daily. He is not having any chest pain, shortness of breath or other neurologic changes. ER precautions have been given to patient if he should have any chest pain, shortness of breath or other neurologic changes. We'll continue to follow.    We'll have him continue to take his cholesterol medication as directed. He is not having any darkening of his urine or unusual muscle aches or pains. Also discussed diet avoiding high glycemic index foods, increasing his fiber intake and avoiding fatty and fried foods. Also discussed exercising 4-5 times weekly for 20-30 minutes at a time. We'll continue to follow.     Current medications, allergies, and problem list reviewed with patient and updated in EPIC.          Review of Systems   Constitutional: Negative for chills and fever.   HENT: Negative for hearing loss and  "tinnitus.    Respiratory: Negative for cough, sputum production and shortness of breath.    Cardiovascular: Negative for chest pain and palpitations.   Gastrointestinal: Negative for abdominal pain, nausea and vomiting.   Musculoskeletal: Positive for back pain and joint pain.   Neurological: Positive for tingling.          Objective:     /100   Pulse 82   Temp 36.7 °C (98 °F)   Resp 14   Ht 1.956 m (6' 5\")   Wt 107.5 kg (237 lb)   SpO2 96%   BMI 28.10 kg/m²      Physical Exam   Constitutional: He is oriented to person, place, and time. He appears well-developed and well-nourished.   HENT:   Head: Normocephalic and atraumatic.   Cardiovascular: Normal rate, regular rhythm and normal heart sounds.  Exam reveals no friction rub.    No murmur heard.  Pulmonary/Chest: Effort normal and breath sounds normal. No respiratory distress. He has no wheezes. He has no rales.   Abdominal: Soft. Bowel sounds are normal. He exhibits no distension. There is no tenderness.   Neurological: He is alert and oriented to person, place, and time.   Skin: Skin is warm and dry.   Psychiatric: He has a normal mood and affect. His behavior is normal.   Nursing note and vitals reviewed.              Assessment/Plan:     1. Essential hypertension  Will increase his lisinopril to 40 mg daily. He has been advised to monitor blood pressure at home and keep notes. If blood pressure elevated or having symptoms of CP, SOB or neurologic changes to go to the er.       2. Diabetes 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, without long-term current use of insulin (CMS-HCC)  Will have him increase his januvia to 50 mg qd and continue to take his other medications as directed. His A1c was > 13. Will have him referred to a diabetic specialist for assistance in management of his diabetes. Will also have him check his feet daily for skin breakdown or changes in sensation. Will continue to follow.   - sitagliptin (JANUVIA) 50 " MG Tab; Take 1 Tab by mouth every day.  Dispense: 30 Tab; Refill: 3  - REFERRAL TO ENDOCRINOLOGY    3. Type 2 diabetes mellitus with diabetic neuropathy, with long-term current use of insulin (CMS-HCC)  See above plan.    4. Hyperlipidemia associated with type 2 diabetes mellitus (CMS-HCC)  Will have him continue to take his cholesterol medication as directed. He has been advised to increase the fibers in his diet, avoid fatty/fried foods. Also advised to exercise as tolerated.

## 2018-01-09 ENCOUNTER — OFFICE VISIT (OUTPATIENT)
Dept: MEDICAL GROUP | Facility: MEDICAL CENTER | Age: 53
End: 2018-01-09
Attending: FAMILY MEDICINE
Payer: MEDICAID

## 2018-01-09 VITALS
BODY MASS INDEX: 27.16 KG/M2 | HEART RATE: 84 BPM | TEMPERATURE: 97.6 F | OXYGEN SATURATION: 100 % | DIASTOLIC BLOOD PRESSURE: 100 MMHG | WEIGHT: 230 LBS | SYSTOLIC BLOOD PRESSURE: 130 MMHG | RESPIRATION RATE: 16 BRPM | HEIGHT: 77 IN

## 2018-01-09 DIAGNOSIS — Z72.0 TOBACCO ABUSE: ICD-10-CM

## 2018-01-09 DIAGNOSIS — J43.9 PULMONARY EMPHYSEMA, UNSPECIFIED EMPHYSEMA TYPE (HCC): ICD-10-CM

## 2018-01-09 DIAGNOSIS — I10 ESSENTIAL HYPERTENSION: ICD-10-CM

## 2018-01-09 DIAGNOSIS — E08.21 DIABETES MELLITUS DUE TO UNDERLYING CONDITION WITH DIABETIC NEPHROPATHY, WITH LONG-TERM CURRENT USE OF INSULIN (HCC): ICD-10-CM

## 2018-01-09 DIAGNOSIS — Z79.4 DIABETES MELLITUS DUE TO UNDERLYING CONDITION WITH DIABETIC NEPHROPATHY, WITH LONG-TERM CURRENT USE OF INSULIN (HCC): ICD-10-CM

## 2018-01-09 PROCEDURE — 99213 OFFICE O/P EST LOW 20 MIN: CPT | Performed by: FAMILY MEDICINE

## 2018-01-09 PROCEDURE — 99214 OFFICE O/P EST MOD 30 MIN: CPT | Performed by: FAMILY MEDICINE

## 2018-01-09 RX ORDER — NICOTINE 21 MG/24HR
1 PATCH, TRANSDERMAL 24 HOURS TRANSDERMAL EVERY 24 HOURS
Qty: 30 PATCH | Refills: 3 | Status: SHIPPED | OUTPATIENT
Start: 2018-01-09

## 2018-01-09 RX ORDER — BUDESONIDE AND FORMOTEROL FUMARATE DIHYDRATE 160; 4.5 UG/1; UG/1
AEROSOL RESPIRATORY (INHALATION)
Qty: 1 INHALER | Refills: 11 | Status: SHIPPED | OUTPATIENT
Start: 2018-01-09 | End: 2018-02-28

## 2018-01-09 RX ORDER — ALBUTEROL SULFATE 90 UG/1
2 AEROSOL, METERED RESPIRATORY (INHALATION) EVERY 6 HOURS PRN
Qty: 8.5 G | Refills: 6 | Status: SHIPPED | OUTPATIENT
Start: 2018-01-09 | End: 2018-08-09 | Stop reason: SDUPTHER

## 2018-01-09 ASSESSMENT — ENCOUNTER SYMPTOMS
SPUTUM PRODUCTION: 0
COUGH: 0
ABDOMINAL PAIN: 0
VOMITING: 0
SENSORY CHANGE: 0
SPEECH CHANGE: 0
BACK PAIN: 1
TREMORS: 0
TINGLING: 1
NECK PAIN: 0
FEVER: 0
SHORTNESS OF BREATH: 0
NAUSEA: 0
CHILLS: 0
PALPITATIONS: 0

## 2018-01-09 NOTE — PROGRESS NOTES
Subjective:      Wagner Kenney is a 52 y.o. male who presents with Follow-Up and Diabetes            Patient here for follow-up of his blood pressure, COPD, tobacco abuse, diabetes.      He has been taking his blood pressure medications as prescribed. His blood pressure today appears to be within normal limits. We'll have him continue to take his medications as directed. He is not having any chest pains, shortness of breath or other neurologic changes. We'll also have him check his blood pressures at home and keep notes. We'll continue to follow.    Patient states he recently lost his inhaler on the bus so he has not been able to use his inhaler as prescribed. Today he is having some difficulty breathing with wheezing. He like to get his inhalers refilled today. Will send prescriptions over to the pharmacy for him to  and start using as directed. He is not having any fever, no productive cough and no other upper respiratory symptoms today. We'll continue to follow.    Patient also would like to attempt to quit smoking so nicotine patches will be prescribed today. Patient states he has gotten himself down to half a pack a day. So will prescribe the nicotine 14 mg for 24 hour patch. Also recommended attempting smoking cessation classes in the community will continue to follow.    He recently scheduled an appointment with a diabetic specialist. He will continue to take his medications as directed. His last hemoglobin A1c was 13.3. We'll have him continue to check his blood sugars at home and keep notes. He will also check his feet daily for any skin breakdown or changes in sensation. He has also been reminded to schedule an appointment with ophthalmology for a diabetic retinal exam. We'll continue to follow.     Current medications, allergies, and problem list reviewed with patient and updated in EPIC.          Review of Systems   Constitutional: Negative for chills and fever.   HENT: Negative for hearing  "loss and tinnitus.    Respiratory: Negative for cough, sputum production and shortness of breath.    Cardiovascular: Negative for chest pain and palpitations.   Gastrointestinal: Negative for abdominal pain, nausea and vomiting.   Musculoskeletal: Positive for back pain. Negative for joint pain and neck pain.   Neurological: Positive for tingling. Negative for tremors, sensory change and speech change.          Objective:     /100   Pulse 84   Temp 36.4 °C (97.6 °F)   Resp 16   Ht 1.956 m (6' 5\")   Wt 104.3 kg (230 lb)   SpO2 100%   BMI 27.27 kg/m²      Physical Exam   Constitutional: He is oriented to person, place, and time. He appears well-developed and well-nourished.   HENT:   Head: Normocephalic and atraumatic.   Cardiovascular: Normal rate, regular rhythm and normal heart sounds.  Exam reveals no friction rub.    No murmur heard.  Pulmonary/Chest: Effort normal. No respiratory distress. He has no wheezes. He has no rales.   Coarse breath sounds    Abdominal: Soft. Bowel sounds are normal. He exhibits no distension. There is no tenderness.   Neurological: He is alert and oriented to person, place, and time.   Skin: Skin is warm and dry.   Psychiatric: He has a normal mood and affect. His behavior is normal.   Nursing note and vitals reviewed.              Assessment/Plan:     1. Tobacco abuse  We'll have him start taking patches at 14 mg over 24 hours and will have patient attend smoking cessation classes in the community will continue to follow.    2. Pulmonary emphysema, unspecified emphysema type (CMS-HCC)  We'll have him refill his inhalers today. Have him continue to use as previously directed. We'll continue to follow.  - albuterol 108 (90 Base) MCG/ACT Aero Soln inhalation aerosol; Inhale 2 Puffs by mouth every 6 hours as needed for Shortness of Breath.  Dispense: 8.5 g; Refill: 6    3. Essential hypertension  We'll have him continue to take his medications as previously directed. He has " been advised to monitor blood pressure at home and keep notes. If blood pressure elevated or having symptoms of CP, SOB or neurologic changes to go to the er.      4. Diabetes 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, with long-term current use of insulin (CMS-[facility])  We'll have him continue to use his medications as previously directed. He will be establishing with a diabetic specialist for additional assistance in management. His last hemoglobin A1c was 13.3. Will continue to check his feet daily for skin breakdown or changes in sensation and schedule an appointment with ophthalmology for diabetic retinal exam.

## 2018-01-25 ENCOUNTER — APPOINTMENT (OUTPATIENT)
Dept: ENDOCRINOLOGY | Facility: MEDICAL CENTER | Age: 53
End: 2018-01-25
Payer: MEDICAID

## 2018-02-02 ENCOUNTER — OFFICE VISIT (OUTPATIENT)
Dept: ENDOCRINOLOGY | Facility: MEDICAL CENTER | Age: 53
End: 2018-02-02
Payer: MEDICAID

## 2018-02-02 VITALS
BODY MASS INDEX: 27.16 KG/M2 | OXYGEN SATURATION: 97 % | HEIGHT: 77 IN | DIASTOLIC BLOOD PRESSURE: 82 MMHG | SYSTOLIC BLOOD PRESSURE: 126 MMHG | HEART RATE: 96 BPM | WEIGHT: 230 LBS

## 2018-02-02 DIAGNOSIS — Z79.4 ENCOUNTER FOR LONG-TERM (CURRENT) USE OF INSULIN (HCC): ICD-10-CM

## 2018-02-02 DIAGNOSIS — E11.65 TYPE 2 DIABETES MELLITUS WITH HYPERGLYCEMIA, WITHOUT LONG-TERM CURRENT USE OF INSULIN (HCC): ICD-10-CM

## 2018-02-02 PROCEDURE — 99214 OFFICE O/P EST MOD 30 MIN: CPT | Performed by: PHYSICIAN ASSISTANT

## 2018-02-02 RX ORDER — PIOGLITAZONEHYDROCHLORIDE 30 MG/1
30 TABLET ORAL DAILY
Qty: 30 TAB | Refills: 11 | Status: SHIPPED | OUTPATIENT
Start: 2018-02-02 | End: 2018-02-23 | Stop reason: SDUPTHER

## 2018-02-02 RX ORDER — LIRAGLUTIDE 6 MG/ML
1.8 INJECTION SUBCUTANEOUS DAILY
Qty: 3 PEN | Refills: 6 | Status: SHIPPED | OUTPATIENT
Start: 2018-02-02 | End: 2018-02-23 | Stop reason: SDUPTHER

## 2018-02-02 RX ORDER — BLOOD-GLUCOSE METER
1 EACH MISCELLANEOUS
Qty: 1 KIT | Refills: 1 | Status: SHIPPED | OUTPATIENT
Start: 2018-02-02 | End: 2018-02-20

## 2018-02-02 RX ORDER — EMPAGLIFLOZIN 25 MG/1
1 TABLET, FILM COATED ORAL DAILY
Qty: 30 TAB | Refills: 3 | COMMUNITY
Start: 2018-02-02 | End: 2018-02-23 | Stop reason: SDUPTHER

## 2018-02-02 NOTE — PROGRESS NOTES
Return to office Patient Consult Note  Referred by: Eleuterio Lara M.D.    Reason for consult: Diabetes Management Type 2    HPI:  Wagner Kenney is a 52 y.o. old patient who is seeing us today for diabetes care.  This is a pleasant patient with diabetes and I appreciate the opportunity to participate in the care of this patient.    Labs of 12/26/17 HbA1c is 13.3, GFR>60,    HbA1c of  7/20/17 is 12.7   HbA1c of 11/7/16 is 13.2  HbA1c of 12/2/15 was 12.9  HbA1c of 2/21/14 was 10.5    BG Diary:2/2/2018  In the AM: did not bring    No eye issues and seeing them in April  Feet numbness and burning    1. Type 2 diabetes mellitus with hyperglycemia, without long-term current use of insulin (CMS-HCC)  This is a new patient with me 2/2/18    He is on:  1.  Januvia  2.  Novolog  (once a day before breakfast 40units)  3.  Lantus  (has not been using)      2. Encounter for long-term (current) use of insulin (CMS-HCC)    STOP  1.  Januvia  2.  Novolog  (once a day before breakfast 40units)  3.  Lantus  (has not been using)    START:  1.  Tresiba 30units at night before bed  2.   Victoza 0.6 at night before bed  3.   Jardiance 25mg once a day  4.   Actos 30mg one a day      ROS:   Constitutional: No night sweats.  Eyes:  No visual changes.  Cardiac: No chest pain, No palpitations or racing heart rate.  Resp: No shortness of breath, No cough,   Gi: No Diarrhea    All other systems were reviewed and were/are negative.      Past Medical History:  Patient Active Problem List    Diagnosis Date Noted   • Acute on chronic respiratory failure with hypoxia (CMS-HCC) 02/13/2016     Priority: High   • COPD with acute exacerbation (CMS-HCC) 02/13/2016     Priority: High   • Viral sepsis (CMS-HCC) 02/13/2016     Priority: High   • Influenza A 02/13/2016     Priority: High   • Sepsis (CMS-HCC) 07/22/2015     Priority: High   • CAP (community acquired pneumonia) 07/22/2015     Priority: High   • Asthma exacerbation 10/16/2012     Priority:  High   • Type 2 diabetes mellitus with hyperglycemia (CMS-HCC) 02/13/2016     Priority: Medium   • Type 2 diabetes mellitus with neurologic complication (CMS-HCC) 02/13/2016     Priority: Medium   • Diabetes 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, with long-term current use of insulin (CMS-HCC) 07/23/2015     Priority: Medium   • COPD exacerbation (CMS-HCC) 04/04/2013     Priority: Medium   • Chronic kidney disease (CKD), stage III (moderate) 07/26/2012     Priority: Medium   • HTN (hypertension) 07/15/2009     Priority: Medium   • Tobacco abuse 07/17/2015     Priority: Low   • Hyperlipidemia with target LDL less than 100 01/13/2012     Priority: Low   • Encounter for long-term (current) use of insulin (CMS-HCC) 02/02/2018   • Type 2 diabetes mellitus with diabetic neuropathy, with long-term current use of insulin (CMS-HCC) 06/12/2017   • Osteoarthritis of spine with radiculopathy, cervical region 09/09/2016   • DDD (degenerative disc disease), cervical 09/09/2016   • Cervical radiculopathy 09/09/2016   • Chronic neck pain 09/09/2016   • Hyperlipidemia associated with type 2 diabetes mellitus (CMS-HCC) 10/06/2015   • Cocaine use 09/30/2015   • Back pain 07/17/2015   • DKA (diabetic ketoacidoses) (CMS-HCC) 07/17/2015   • Increased anion gap metabolic acidosis 07/17/2015   • Hypercalcemia 07/30/2013   • Vitamin d deficiency 07/30/2013   • Hypertriglyceridemia 06/13/2013   • DIABETES MELLITUS 10/17/2012   • Renal cyst 07/26/2012   • DM (diabetes mellitus) (CMS-HCC) 07/25/2012   • CKD (chronic kidney disease) 01/13/2012   • HTN (hypertension) 01/13/2012   • DIABETES MELLITUS 11/08/2011   • Diabetes 04/18/2011   • Hand pain 08/30/2010   • Foot pain 08/30/2010   • Right-sided heart failure 05/17/2010   • ANABELA (obstructive sleep apnea) 04/14/2010   • COPD (chronic obstructive pulmonary disease) (CMS-HCC) 03/31/2010   • GERD (gastroesophageal reflux disease) 07/15/2009   • Chronic low back pain  07/15/2009       Past Surgical History:  Past Surgical History:   Procedure Laterality Date   • OTHER ORTHOPEDIC SURGERY  knee       Allergies:  Apple; Asa [aspirin]; and Metformin    Social History:  Social History     Social History   • Marital status: Single     Spouse name: N/A   • Number of children: N/A   • Years of education: N/A     Occupational History   • Not on file.     Social History Main Topics   • Smoking status: Current Every Day Smoker     Packs/day: 0.50     Years: 25.00     Types: Cigarettes     Last attempt to quit: 5/26/2012   • Smokeless tobacco: Never Used   • Alcohol use 0.0 - 0.6 oz/week   • Drug use: No   • Sexual activity: Not on file     Other Topics Concern   •  Service No   • Blood Transfusions No   • Caffeine Concern No   • Occupational Exposure No   • Hobby Hazards No   • Sleep Concern No     hard to sleep   • Stress Concern No   • Weight Concern No   • Special Diet Yes     diabetic    • Back Care Yes   • Exercise Yes     little bit   • Bike Helmet No     does not ride bike    • Seat Belt Yes   • Self-Exams No     Social History Narrative   • No narrative on file       Family History:  Family History   Problem Relation Age of Onset   • Hypertension Mother    • Diabetes Father    • Hypertension Father    • Diabetes Sister        Medications:    Current Outpatient Prescriptions:   •  Blood Glucose Monitoring Suppl (ONETOUCH VERIO) w/Device Kit, 1 Device by Does not apply route 3 times a day before meals., Disp: 1 Kit, Rfl: 1  •  glucose blood (ONETOUCH VERIO) strip, 1 Strip by Other route as needed (on insulin checking 3-4 times a day)., Disp: 150 Strip, Rfl: 6  •  VICTOZA 18 MG/3ML Solution Pen-injector injection, Inject 0.3 mL as instructed every day., Disp: 3 PEN, Rfl: 6  •  Insulin Degludec (TRESIBA FLEXTOUCH) 200 UNIT/ML Solution Pen-injector, Inject 50 Units as instructed every bedtime., Disp: 3 PEN, Rfl: 2  •  pioglitazone (ACTOS) 30 MG Tab, Take 1 Tab by mouth every  day., Disp: 30 Tab, Rfl: 11  •  JARDIANCE 25 MG Tab, Take 1 tablet by mouth every day., Disp: 30 Tab, Rfl: 3  •  albuterol 108 (90 Base) MCG/ACT Aero Soln inhalation aerosol, Inhale 2 Puffs by mouth every 6 hours as needed for Shortness of Breath., Disp: 8.5 g, Rfl: 6  •  budesonide-formoterol (SYMBICORT) 160-4.5 MCG/ACT Aerosol, INHALE 2 PUFFS BY MOUTH 2 TIMES A DAY., Disp: 1 Inhaler, Rfl: 11  •  SPIRIVA HANDIHALER 18 MCG Cap, INHALE 1 CAPSULE BY MOUTH 1 TIME DAILY AS NEEDED (SHORTNESS OF BREATH)., Disp: 3 Cap, Rfl: 2  •  lisinopril (PRINIVIL, ZESTRIL) 40 MG tablet, Take 1 Tab by mouth every day., Disp: 30 Tab, Rfl: 3  •  atorvastatin (LIPITOR) 20 MG Tab, Take 1 Tab by mouth every day., Disp: 30 Tab, Rfl: 3  •  ranitidine (ZANTAC) 150 MG Tab, Take 1 Tab by mouth 2 times a day as needed for Heartburn., Disp: 60 Tab, Rfl: 3  •  amlodipine (NORVASC) 10 MG Tab, Take 1 Tab by mouth every day., Disp: 90 Tab, Rfl: 1  •  PRECISION XTRA TEST STRIPS strip, Use as directed, Disp: 120 Strip, Rfl: 11  •  TECHLITE LANCETS Misc, Use as directed, Disp: 120 Each, Rfl: 11  •  NOVOLOG, insulin aspart, (NOVOLOG FLEXPEN) 100 UNIT/ML Solution Pen-injector injection, Inject 5-15 Units as instructed 3 times a day before meals., Disp: 5 PEN, Rfl: 11  •  cyclobenzaprine (FLEXERIL) 10 MG Tab, Take 1/2  to 1 tablet by mouth twice per day as for muscle spasm, Disp: 60 Tab, Rfl: 2  •  gabapentin (NEURONTIN) 100 MG Cap, Take 1 to 2 tablets by mouth three times per day as needed for nerve pain, Disp: 90 Cap, Rfl: 2  •  NOVOFINE 32G X 6 MM Misc, Use as directed with insulin 4 times daily, Disp: 150 Each, Rfl: 11  •  Misc. Devices Misc, Diabetic shoes to use as directed for B near ulcerating callouses on plantar surfaces of feet, Disp: 2 Device, Rfl: 0  •  nicotine (NICODERM) 14 MG/24HR PATCH 24 HR, Apply 1 Patch to skin as directed every 24 hours., Disp: 30 Patch, Rfl: 3  •  ondansetron (ZOFRAN ODT) 4 MG TABLET DISPERSIBLE, Take 1 Tab by mouth  "every 8 hours as needed for Nausea., Disp: 10 Tab, Rfl: 3  •  bupivacaine 0.25% (SENSORCAINE-MARCAINE) 0.25 % Solution, , Disp: , Rfl:   •  ADACEL 5-2-15.5 LF-MCG/0.5 Suspension, , Disp: , Rfl:   •  Misc. Devices Misc, Precision xtra glucometer and test strips and lancets to check his blood sugars 4 times daily #120, Disp: 120 Device, Rfl: 11        Physical Examination:   Vital signs: /82   Pulse 96   Ht 1.956 m (6' 5\")   Wt 104.3 kg (230 lb)   SpO2 97%   BMI 27.27 kg/m²   General: No distress, cooperative, well dressed and well nourished.   Eyes: No scleral icterus or discharge, No hyposphagma  ENMT: Normal on external inspection of nose, lips, No nasal drainage   Neck: No abnormal masses on inspection  Resp: Normal effort, Bilateral clear to auscultation, No wheezing, No rales  CVS: Regular rate and rhythm, S1 S2 normal, No murmur. No gallop  Extremities: No edema bilateral extremities  Neuro: Alert and oriented  Skin: No rash, No Ulcers  Psych: Normal mood and affect      Assessment and Plan:    1. Type 2 diabetes mellitus with hyperglycemia, without long-term current use of insulin (CMS-HCC)    STOP  1.  Januvia  2.  Novolog  (once a day before breakfast 40units)  3.  Lantus  (has not been using)    START:  1.  Tresiba 30units at night before bed  2.   Victoza 0.6 at night before bed  3.   Jardiance 25mg once a day  4.   Actos 30mg one a day      2. Encounter for long-term (current) use of insulin (CMS-HCC)    He is taking Novolog 40units in the AM with Januvia.      The total time spent seeing this patient today face to face in consultation, and formulating an action plan for this visit was greater than 25 minutes. > Than 50% of this time was spent counseling, discussing problems documented above and below, coordinating care and answering questions by the physician assistant.  We developed a diabetes care plan for this patient today.      Return in about 1 week (around 2/9/2018).    Blood glucose " log: Check BG in the morning when wake up, before lunch or dinner and before bed.  So three times a day.  Always bring BG diary to the next office visit.     This patient during there office visit today was started on a new medication  Actos , Jardiance, Tresiba.  Side effects of the new medication were discussed with the patient today in the office.       Thank you kindly for allowing me to participate in the diabetes care plan for this patient.    Anam Mars PA-C, BC-ADM  Board Certified - Advanced Diabetes Management  02/02/18    CC:   Eleuterio Lara M.D.

## 2018-02-02 NOTE — PATIENT INSTRUCTIONS
Blood glucose log: Check BG in the morning when wake up, before lunch or dinner and before bed.  So three times a day.  Always bring BG diary to the next office visit.   STOP  1.  Januvia  2.  Novolog  (once a day before breakfast 40units)  3.  Lantus  (has not been using)    START:  1.  Tresiba 30units at night before bed  2.   Victoza 0.6 at night before bed  3.   Jardiance 25mg once a day  4.   Actos 30mg one a day

## 2018-02-07 ENCOUNTER — HOSPITAL ENCOUNTER (OUTPATIENT)
Dept: LAB | Facility: MEDICAL CENTER | Age: 53
End: 2018-02-07
Attending: PHYSICIAN ASSISTANT
Payer: MEDICAID

## 2018-02-07 DIAGNOSIS — Z79.4 ENCOUNTER FOR LONG-TERM (CURRENT) USE OF INSULIN (HCC): ICD-10-CM

## 2018-02-07 DIAGNOSIS — E11.65 TYPE 2 DIABETES MELLITUS WITH HYPERGLYCEMIA, WITHOUT LONG-TERM CURRENT USE OF INSULIN (HCC): ICD-10-CM

## 2018-02-07 PROCEDURE — 83516 IMMUNOASSAY NONANTIBODY: CPT

## 2018-02-07 PROCEDURE — 84681 ASSAY OF C-PEPTIDE: CPT

## 2018-02-07 PROCEDURE — 36415 COLL VENOUS BLD VENIPUNCTURE: CPT

## 2018-02-09 ENCOUNTER — OFFICE VISIT (OUTPATIENT)
Dept: ENDOCRINOLOGY | Facility: MEDICAL CENTER | Age: 53
End: 2018-02-09
Payer: MEDICAID

## 2018-02-09 VITALS
HEIGHT: 77 IN | RESPIRATION RATE: 16 BRPM | DIASTOLIC BLOOD PRESSURE: 80 MMHG | SYSTOLIC BLOOD PRESSURE: 116 MMHG | BODY MASS INDEX: 27.75 KG/M2 | HEART RATE: 104 BPM | OXYGEN SATURATION: 98 % | WEIGHT: 235 LBS

## 2018-02-09 DIAGNOSIS — E11.40 TYPE 2 DIABETES MELLITUS WITH DIABETIC NEUROPATHY, WITHOUT LONG-TERM CURRENT USE OF INSULIN (HCC): ICD-10-CM

## 2018-02-09 LAB
C PEPTIDE SERPL-MCNC: 1.6 NG/ML (ref 0.8–3.5)
GAD65 AB SER IA-ACNC: <5 IU/ML (ref 0–5)

## 2018-02-09 PROCEDURE — 99213 OFFICE O/P EST LOW 20 MIN: CPT | Performed by: PHYSICIAN ASSISTANT

## 2018-02-09 NOTE — PATIENT INSTRUCTIONS
START:  1.  Tresiba 30units at night before bed (INCREASE to 36)  2.   Victoza 0.6 at night before bed (INCREASE to 1.2)  3.   Jardiance 25mg once a day  4.   Actos 30mg one a day

## 2018-02-09 NOTE — PROGRESS NOTES
Return to office Patient Consult Note  Referred by: Eleuterio Lara M.D.    Reason for consult: Diabetes Management Type 2    HPI:  Wagner Kenney is a 52 y.o. old patient who is seeing us today for diabetes care.  This is a pleasant patient with diabetes and I appreciate the opportunity to participate in the care of this patient.    BG Diary:2/9/2018  In the AM: did not bring    c-peptide is 1.6  Labs of 12/26/17 HbA1c is 13.3, GFR>60,    HbA1c of  7/20/17 is 12.7   HbA1c of 11/7/16 is 13.2  HbA1c of 12/2/15 was 12.9  HbA1c of 2/21/14 was 10.5     BG Diary:2/2/2018  In the AM: did not bring     No eye issues and seeing them in April  Feet numbness and burning    Weight on 2/2/18 he was 230 pounds and today he is 235      1. Type 2 diabetes mellitus with diabetic neuropathy, without long-term current use of insulin (CMS-HCC)    This is a new patient with me 2/2/18     He is on:  1.  Januvia  2.  Novolog  (once a day before breakfast 40units)  3.  Lantus  (has not been using)        2. Encounter for long-term (current) use of insulin (CMS-HCC)     STOP  1.  Januvia  2.  Novolog  (once a day before breakfast 40units)  3.  Lantus  (has not been using)     START:  1.  Tresiba 30units at night before bed  2.   Victoza 0.6 at night before bed  3.   Jardiance 25mg once a day  4.   Actos 30mg one a day         ROS:   Constitutional: No night sweats.  Eyes:  No visual changes.  Cardiac: No chest pain, No palpitations or racing heart rate.  Resp: No shortness of breath, No cough,   Gi: No Diarrhea    All other systems were reviewed and were/are negative.  The ROS was revised/revisited during this office visit from the patients first office visit with me on 2/2/18Please review the full ROS during the first office visit.    Past Medical History:  Patient Active Problem List    Diagnosis Date Noted   • Acute on chronic respiratory failure with hypoxia (CMS-HCC) 02/13/2016     Priority: High   • COPD with acute exacerbation  (CMS-HCC) 02/13/2016     Priority: High   • Viral sepsis (CMS-HCC) 02/13/2016     Priority: High   • Influenza A 02/13/2016     Priority: High   • Sepsis (CMS-HCC) 07/22/2015     Priority: High   • CAP (community acquired pneumonia) 07/22/2015     Priority: High   • Asthma exacerbation 10/16/2012     Priority: High   • Type 2 diabetes mellitus with hyperglycemia (CMS-HCC) 02/13/2016     Priority: Medium   • Type 2 diabetes mellitus with neurologic complication (CMS-HCC) 02/13/2016     Priority: Medium   • Diabetes 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, with long-term current use of insulin (CMS-HCC) 07/23/2015     Priority: Medium   • COPD exacerbation (CMS-HCC) 04/04/2013     Priority: Medium   • Chronic kidney disease (CKD), stage III (moderate) 07/26/2012     Priority: Medium   • HTN (hypertension) 07/15/2009     Priority: Medium   • Tobacco abuse 07/17/2015     Priority: Low   • Hyperlipidemia with target LDL less than 100 01/13/2012     Priority: Low   • Encounter for long-term (current) use of insulin (CMS-HCC) 02/02/2018   • Type 2 diabetes mellitus with diabetic neuropathy, with long-term current use of insulin (CMS-HCC) 06/12/2017   • Osteoarthritis of spine with radiculopathy, cervical region 09/09/2016   • DDD (degenerative disc disease), cervical 09/09/2016   • Cervical radiculopathy 09/09/2016   • Chronic neck pain 09/09/2016   • Hyperlipidemia associated with type 2 diabetes mellitus (CMS-HCC) 10/06/2015   • Cocaine use 09/30/2015   • Back pain 07/17/2015   • DKA (diabetic ketoacidoses) (CMS-HCC) 07/17/2015   • Increased anion gap metabolic acidosis 07/17/2015   • Hypercalcemia 07/30/2013   • Vitamin d deficiency 07/30/2013   • Hypertriglyceridemia 06/13/2013   • DIABETES MELLITUS 10/17/2012   • Renal cyst 07/26/2012   • DM (diabetes mellitus) (CMS-HCC) 07/25/2012   • CKD (chronic kidney disease) 01/13/2012   • HTN (hypertension) 01/13/2012   • DIABETES MELLITUS 11/08/2011   •  Diabetes 04/18/2011   • Hand pain 08/30/2010   • Foot pain 08/30/2010   • Right-sided heart failure 05/17/2010   • ANABELA (obstructive sleep apnea) 04/14/2010   • COPD (chronic obstructive pulmonary disease) (CMS-HCC) 03/31/2010   • GERD (gastroesophageal reflux disease) 07/15/2009   • Chronic low back pain 07/15/2009       Past Surgical History:  Past Surgical History:   Procedure Laterality Date   • OTHER ORTHOPEDIC SURGERY  knee       Allergies:  Apple; Asa [aspirin]; and Metformin    Social History:  Social History     Social History   • Marital status: Single     Spouse name: N/A   • Number of children: N/A   • Years of education: N/A     Occupational History   • Not on file.     Social History Main Topics   • Smoking status: Current Every Day Smoker     Packs/day: 0.50     Years: 25.00     Types: Cigarettes     Last attempt to quit: 5/26/2012   • Smokeless tobacco: Never Used   • Alcohol use 0.0 - 0.6 oz/week   • Drug use: No   • Sexual activity: Not on file     Other Topics Concern   •  Service No   • Blood Transfusions No   • Caffeine Concern No   • Occupational Exposure No   • Hobby Hazards No   • Sleep Concern No     hard to sleep   • Stress Concern No   • Weight Concern No   • Special Diet Yes     diabetic    • Back Care Yes   • Exercise Yes     little bit   • Bike Helmet No     does not ride bike    • Seat Belt Yes   • Self-Exams No     Social History Narrative   • No narrative on file       Family History:  Family History   Problem Relation Age of Onset   • Hypertension Mother    • Diabetes Father    • Hypertension Father    • Diabetes Sister        Medications:    Current Outpatient Prescriptions:   •  albuterol 108 (90 Base) MCG/ACT Aero Soln inhalation aerosol, Inhale 2 Puffs by mouth every 6 hours as needed for Shortness of Breath., Disp: 8.5 g, Rfl: 6  •  budesonide-formoterol (SYMBICORT) 160-4.5 MCG/ACT Aerosol, INHALE 2 PUFFS BY MOUTH 2 TIMES A DAY., Disp: 1 Inhaler, Rfl: 11  •  SPIRIVA  HANDIHALER 18 MCG Cap, INHALE 1 CAPSULE BY MOUTH 1 TIME DAILY AS NEEDED (SHORTNESS OF BREATH)., Disp: 3 Cap, Rfl: 2  •  lisinopril (PRINIVIL, ZESTRIL) 40 MG tablet, Take 1 Tab by mouth every day., Disp: 30 Tab, Rfl: 3  •  atorvastatin (LIPITOR) 20 MG Tab, Take 1 Tab by mouth every day., Disp: 30 Tab, Rfl: 3  •  ranitidine (ZANTAC) 150 MG Tab, Take 1 Tab by mouth 2 times a day as needed for Heartburn., Disp: 60 Tab, Rfl: 3  •  amlodipine (NORVASC) 10 MG Tab, Take 1 Tab by mouth every day., Disp: 90 Tab, Rfl: 1  •  PRECISION XTRA TEST STRIPS strip, Use as directed, Disp: 120 Strip, Rfl: 11  •  TECHLITE LANCETS Misc, Use as directed, Disp: 120 Each, Rfl: 11  •  NOVOLOG, insulin aspart, (NOVOLOG FLEXPEN) 100 UNIT/ML Solution Pen-injector injection, Inject 5-15 Units as instructed 3 times a day before meals., Disp: 5 PEN, Rfl: 11  •  cyclobenzaprine (FLEXERIL) 10 MG Tab, Take 1/2  to 1 tablet by mouth twice per day as for muscle spasm, Disp: 60 Tab, Rfl: 2  •  gabapentin (NEURONTIN) 100 MG Cap, Take 1 to 2 tablets by mouth three times per day as needed for nerve pain, Disp: 90 Cap, Rfl: 2  •  NOVOFINE 32G X 6 MM Misc, Use as directed with insulin 4 times daily, Disp: 150 Each, Rfl: 11  •  Misc. Devices Misc, Diabetic shoes to use as directed for B near ulcerating callouses on plantar surfaces of feet, Disp: 2 Device, Rfl: 0  •  Blood Glucose Monitoring Suppl (ONETOUCH VERIO) w/Device Kit, 1 Device by Does not apply route 3 times a day before meals., Disp: 1 Kit, Rfl: 1  •  glucose blood (ONETOUCH VERIO) strip, 1 Strip by Other route as needed (on insulin checking 3-4 times a day)., Disp: 150 Strip, Rfl: 6  •  VICTOZA 18 MG/3ML Solution Pen-injector injection, Inject 0.3 mL as instructed every day., Disp: 3 PEN, Rfl: 6  •  Insulin Degludec (TRESIBA FLEXTOUCH) 200 UNIT/ML Solution Pen-injector, Inject 50 Units as instructed every bedtime., Disp: 3 PEN, Rfl: 2  •  pioglitazone (ACTOS) 30 MG Tab, Take 1 Tab by mouth every  "day., Disp: 30 Tab, Rfl: 11  •  JARDIANCE 25 MG Tab, Take 1 tablet by mouth every day., Disp: 30 Tab, Rfl: 3  •  nicotine (NICODERM) 14 MG/24HR PATCH 24 HR, Apply 1 Patch to skin as directed every 24 hours., Disp: 30 Patch, Rfl: 3  •  ondansetron (ZOFRAN ODT) 4 MG TABLET DISPERSIBLE, Take 1 Tab by mouth every 8 hours as needed for Nausea., Disp: 10 Tab, Rfl: 3  •  bupivacaine 0.25% (SENSORCAINE-MARCAINE) 0.25 % Solution, , Disp: , Rfl:   •  ADACEL 5-2-15.5 LF-MCG/0.5 Suspension, , Disp: , Rfl:   •  Misc. Devices Misc, Precision xtra glucometer and test strips and lancets to check his blood sugars 4 times daily #120, Disp: 120 Device, Rfl: 11      Physical Examination:   Vital signs: /80   Pulse (!) 104   Resp 16   Ht 1.956 m (6' 5\")   Wt 106.6 kg (235 lb)   SpO2 98%   BMI 27.87 kg/m²   General: No distress, cooperative, well dressed and well nourished.   Eyes: No scleral icterus or discharge, No hyposphagma  ENMT: Normal on external inspection of nose, lips, No nasal drainage   Neck: No abnormal masses on inspection  Resp: Normal effort, Bilateral clear to auscultation, No wheezing, No rales  CVS: Regular rate and rhythm, S1 S2 normal, No murmur. No gallop  Extremities: No edema bilateral extremities  Neuro: Alert and oriented  Skin: No rash, No Ulcers  Psych: Normal mood and affect      Assessment and Plan:    1. Type 2 diabetes mellitus with diabetic neuropathy, without long-term current use of insulin (CMS-formerly [facility])     START:  1.  Tresiba 30units at night before bed (INCREASE to 36)  2.   Victoza 0.6 at night before bed (INCREASE to 1.2)  3.   Jardiance 25mg once a day  4.   Actos 30mg one a day      Return in about 1 week (around 2/16/2018).    Blood glucose log: Check BG in the morning when wake up, before lunch or dinner and before bed.  So three times a day.  Always bring BG diary to the next office visit.     Thank you kindly for allowing me to participate in the diabetes care plan for this " patient.    Anam Mars PA-C, BC-[facility]  Board Certified - Advanced Diabetes Management  02/09/18    CC:   Eleuterio Lara M.D.

## 2018-02-15 ENCOUNTER — OFFICE VISIT (OUTPATIENT)
Dept: ENDOCRINOLOGY | Facility: MEDICAL CENTER | Age: 53
End: 2018-02-15
Payer: MEDICAID

## 2018-02-15 VITALS
OXYGEN SATURATION: 97 % | HEART RATE: 117 BPM | BODY MASS INDEX: 27.35 KG/M2 | DIASTOLIC BLOOD PRESSURE: 62 MMHG | HEIGHT: 77 IN | WEIGHT: 231.6 LBS | SYSTOLIC BLOOD PRESSURE: 108 MMHG

## 2018-02-15 DIAGNOSIS — E11.65 TYPE 2 DIABETES MELLITUS WITH HYPERGLYCEMIA, WITHOUT LONG-TERM CURRENT USE OF INSULIN (HCC): ICD-10-CM

## 2018-02-15 PROCEDURE — 99213 OFFICE O/P EST LOW 20 MIN: CPT | Performed by: PHYSICIAN ASSISTANT

## 2018-02-15 NOTE — PATIENT INSTRUCTIONS
START:  1.  Tresiba 36units at night before bed (INCREASE to 42)  2.   Victoza 1.2 at night before bed (INCREASE to 1.8)  3.   Jardiance 25mg once a day  4.   Actos 30mg one a day

## 2018-02-15 NOTE — PROGRESS NOTES
Return to office Patient Consult Note  Referred by: Eleuterio Lara M.D.    Reason for consult: Diabetes Management Type 2    HPI:  Wagner Kenney is a 52 y.o. old patient who is seeing us today for diabetes care.  This is a pleasant patient with diabetes and I appreciate the opportunity to participate in the care of this patient.    BG Diary:2/15/2018  In the AM: 128, 95, 173, 238, 194, 120, 156, 158, 149  Before Bed: 202, 180, 336, 200, 264, 165, 149, 235     BG Diary:2/9/2018  In the AM: did not bring     c-peptide is 1.6  Labs of 12/26/17 HbA1c is 13.3, GFR>60,    HbA1c of  7/20/17 is 12.7   HbA1c of 11/7/16 is 13.2  HbA1c of 12/2/15 was 12.9  HbA1c of 2/21/14 was 10.5     BG Diary:2/2/2018  In the AM: did not bring     No eye issues and seeing them in April  Feet numbness and burning     Weight on 2/2/18 he was 230 pounds and today he is 231         1. Type 2 diabetes mellitus with hyperglycemia, without long-term current use of insulin (CMS-HCC)    This is a new patient with me 2/2/18     He is on:  1.  Januvia  2.  Novolog  (once a day before breakfast 40units)  3.  Lantus  (has not been using)        2. Encounter for long-term (current) use of insulin (CMS-HCC)     STOP  1.  Januvia  2.  Novolog  (once a day before breakfast 40units)  3.  Lantus  (has not been using)     START:  1.  Tresiba 36units at night before bed  2.   Victoza 1.2 at night before bed  3.   Jardiance 25mg once a day  4.   Actos 30mg one a day      ROS:   Constitutional: No night sweats.  Eyes:  No visual changes.  Cardiac: No chest pain, No palpitations or racing heart rate.  Resp: No shortness of breath, No cough,   Gi: No Diarrhea    All other systems were reviewed and were/are negative.  The ROS was revised/revisited during this office visit from the patients first office visit with me on 2/2/18 Please review the full ROS during the first office visit.    Past Medical History:  Patient Active Problem List    Diagnosis Date Noted   •  Acute on chronic respiratory failure with hypoxia (CMS-HCC) 02/13/2016     Priority: High   • COPD with acute exacerbation (CMS-HCC) 02/13/2016     Priority: High   • Viral sepsis (CMS-HCC) 02/13/2016     Priority: High   • Influenza A 02/13/2016     Priority: High   • Sepsis (CMS-HCC) 07/22/2015     Priority: High   • CAP (community acquired pneumonia) 07/22/2015     Priority: High   • Asthma exacerbation 10/16/2012     Priority: High   • Type 2 diabetes mellitus with hyperglycemia (CMS-HCC) 02/13/2016     Priority: Medium   • Type 2 diabetes mellitus with neurologic complication (CMS-HCC) 02/13/2016     Priority: Medium   • Diabetes 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, with long-term current use of insulin (CMS-HCC) 07/23/2015     Priority: Medium   • COPD exacerbation (CMS-HCC) 04/04/2013     Priority: Medium   • Chronic kidney disease (CKD), stage III (moderate) 07/26/2012     Priority: Medium   • HTN (hypertension) 07/15/2009     Priority: Medium   • Tobacco abuse 07/17/2015     Priority: Low   • Hyperlipidemia with target LDL less than 100 01/13/2012     Priority: Low   • Encounter for long-term (current) use of insulin (CMS-HCC) 02/02/2018   • Type 2 diabetes mellitus with diabetic neuropathy, with long-term current use of insulin (CMS-HCC) 06/12/2017   • Osteoarthritis of spine with radiculopathy, cervical region 09/09/2016   • DDD (degenerative disc disease), cervical 09/09/2016   • Cervical radiculopathy 09/09/2016   • Chronic neck pain 09/09/2016   • Hyperlipidemia associated with type 2 diabetes mellitus (CMS-HCC) 10/06/2015   • Cocaine use 09/30/2015   • Back pain 07/17/2015   • DKA (diabetic ketoacidoses) (CMS-HCC) 07/17/2015   • Increased anion gap metabolic acidosis 07/17/2015   • Hypercalcemia 07/30/2013   • Vitamin d deficiency 07/30/2013   • Hypertriglyceridemia 06/13/2013   • DIABETES MELLITUS 10/17/2012   • Renal cyst 07/26/2012   • DM (diabetes mellitus) (CMS-HCC)  07/25/2012   • CKD (chronic kidney disease) 01/13/2012   • HTN (hypertension) 01/13/2012   • DIABETES MELLITUS 11/08/2011   • Diabetes 04/18/2011   • Hand pain 08/30/2010   • Foot pain 08/30/2010   • Right-sided heart failure 05/17/2010   • ANABELA (obstructive sleep apnea) 04/14/2010   • COPD (chronic obstructive pulmonary disease) (CMS-HCC) 03/31/2010   • GERD (gastroesophageal reflux disease) 07/15/2009   • Chronic low back pain 07/15/2009       Past Surgical History:  Past Surgical History:   Procedure Laterality Date   • OTHER ORTHOPEDIC SURGERY  knee       Allergies:  Apple; Asa [aspirin]; and Metformin    Social History:  Social History     Social History   • Marital status: Single     Spouse name: N/A   • Number of children: N/A   • Years of education: N/A     Occupational History   • Not on file.     Social History Main Topics   • Smoking status: Current Every Day Smoker     Packs/day: 0.50     Years: 25.00     Types: Cigarettes     Last attempt to quit: 5/26/2012   • Smokeless tobacco: Never Used   • Alcohol use 0.0 - 0.6 oz/week   • Drug use: No   • Sexual activity: Not on file     Other Topics Concern   •  Service No   • Blood Transfusions No   • Caffeine Concern No   • Occupational Exposure No   • Hobby Hazards No   • Sleep Concern No     hard to sleep   • Stress Concern No   • Weight Concern No   • Special Diet Yes     diabetic    • Back Care Yes   • Exercise Yes     little bit   • Bike Helmet No     does not ride bike    • Seat Belt Yes   • Self-Exams No     Social History Narrative   • No narrative on file       Family History:  Family History   Problem Relation Age of Onset   • Hypertension Mother    • Diabetes Father    • Hypertension Father    • Diabetes Sister        Medications:    Current Outpatient Prescriptions:   •  Blood Glucose Monitoring Suppl (ONETOUCH VERIO) w/Device Kit, 1 Device by Does not apply route 3 times a day before meals., Disp: 1 Kit, Rfl: 1  •  glucose blood (ONETOUCH  VERIO) strip, 1 Strip by Other route as needed (on insulin checking 3-4 times a day)., Disp: 150 Strip, Rfl: 6  •  VICTOZA 18 MG/3ML Solution Pen-injector injection, Inject 0.3 mL as instructed every day., Disp: 3 PEN, Rfl: 6  •  Insulin Degludec (TRESIBA FLEXTOUCH) 200 UNIT/ML Solution Pen-injector, Inject 50 Units as instructed every bedtime., Disp: 3 PEN, Rfl: 2  •  pioglitazone (ACTOS) 30 MG Tab, Take 1 Tab by mouth every day., Disp: 30 Tab, Rfl: 11  •  JARDIANCE 25 MG Tab, Take 1 tablet by mouth every day., Disp: 30 Tab, Rfl: 3  •  nicotine (NICODERM) 14 MG/24HR PATCH 24 HR, Apply 1 Patch to skin as directed every 24 hours., Disp: 30 Patch, Rfl: 3  •  albuterol 108 (90 Base) MCG/ACT Aero Soln inhalation aerosol, Inhale 2 Puffs by mouth every 6 hours as needed for Shortness of Breath., Disp: 8.5 g, Rfl: 6  •  budesonide-formoterol (SYMBICORT) 160-4.5 MCG/ACT Aerosol, INHALE 2 PUFFS BY MOUTH 2 TIMES A DAY., Disp: 1 Inhaler, Rfl: 11  •  SPIRIVA HANDIHALER 18 MCG Cap, INHALE 1 CAPSULE BY MOUTH 1 TIME DAILY AS NEEDED (SHORTNESS OF BREATH)., Disp: 3 Cap, Rfl: 2  •  lisinopril (PRINIVIL, ZESTRIL) 40 MG tablet, Take 1 Tab by mouth every day., Disp: 30 Tab, Rfl: 3  •  atorvastatin (LIPITOR) 20 MG Tab, Take 1 Tab by mouth every day., Disp: 30 Tab, Rfl: 3  •  ranitidine (ZANTAC) 150 MG Tab, Take 1 Tab by mouth 2 times a day as needed for Heartburn., Disp: 60 Tab, Rfl: 3  •  amlodipine (NORVASC) 10 MG Tab, Take 1 Tab by mouth every day., Disp: 90 Tab, Rfl: 1  •  PRECISION XTRA TEST STRIPS strip, Use as directed, Disp: 120 Strip, Rfl: 11  •  TECHLITE LANCETS Misc, Use as directed, Disp: 120 Each, Rfl: 11  •  NOVOLOG, insulin aspart, (NOVOLOG FLEXPEN) 100 UNIT/ML Solution Pen-injector injection, Inject 5-15 Units as instructed 3 times a day before meals., Disp: 5 PEN, Rfl: 11  •  cyclobenzaprine (FLEXERIL) 10 MG Tab, Take 1/2  to 1 tablet by mouth twice per day as for muscle spasm, Disp: 60 Tab, Rfl: 2  •  gabapentin  "(NEURONTIN) 100 MG Cap, Take 1 to 2 tablets by mouth three times per day as needed for nerve pain, Disp: 90 Cap, Rfl: 2  •  ondansetron (ZOFRAN ODT) 4 MG TABLET DISPERSIBLE, Take 1 Tab by mouth every 8 hours as needed for Nausea., Disp: 10 Tab, Rfl: 3  •  bupivacaine 0.25% (SENSORCAINE-MARCAINE) 0.25 % Solution, , Disp: , Rfl:   •  ADACEL 5-2-15.5 LF-MCG/0.5 Suspension, , Disp: , Rfl:   •  NOVOFINE 32G X 6 MM Misc, Use as directed with insulin 4 times daily, Disp: 150 Each, Rfl: 11  •  Misc. Devices Misc, Precision xtra glucometer and test strips and lancets to check his blood sugars 4 times daily #120, Disp: 120 Device, Rfl: 11  •  Misc. Devices Misc, Diabetic shoes to use as directed for B near ulcerating callouses on plantar surfaces of feet, Disp: 2 Device, Rfl: 0        Physical Examination:   Vital signs: /62   Pulse (!) 117   Ht 1.956 m (6' 5\")   Wt 105.1 kg (231 lb 9.6 oz)   SpO2 97%   BMI 27.46 kg/m²   General: No distress, cooperative, well dressed and well nourished.   Eyes: No scleral icterus or discharge, No hyposphagma  ENMT: Normal on external inspection of nose, lips, No nasal drainage   Neck: No abnormal masses on inspection  Resp: Normal effort, Bilateral clear to auscultation, No wheezing, No rales  CVS: Regular rate and rhythm, S1 S2 normal, No murmur. No gallop  Extremities: No edema bilateral extremities  Neuro: Alert and oriented  Skin: No rash, No Ulcers  Psych: Normal mood and affect      Assessment and Plan:    1. Type 2 diabetes mellitus with hyperglycemia, without long-term current use of insulin (CMS-[facility])    START:  1.  Tresiba 36units at night before bed (INCREASE to 42)  2.   Victoza 1.2 at night before bed (INCREASE to 1.8)  3.   Jardiance 25mg once a day  4.   Actos 30mg one a day      Return in about 1 week (around 2/22/2018).    Blood glucose log: Check BG in the morning when wake up, before lunch or dinner and before bed.  So three times a day.  Always bring BG diary to " the next office visit.       Thank you kindly for allowing me to participate in the diabetes care plan for this patient.    Anam Mars PA-C, BC-ADM  Board Certified - Advanced Diabetes Management  02/15/18    CC:   Eleuterio Lara M.D.

## 2018-02-20 ENCOUNTER — HOSPITAL ENCOUNTER (OUTPATIENT)
Facility: MEDICAL CENTER | Age: 53
End: 2018-02-21
Attending: EMERGENCY MEDICINE | Admitting: INTERNAL MEDICINE
Payer: MEDICAID

## 2018-02-20 ENCOUNTER — RESOLUTE PROFESSIONAL BILLING HOSPITAL PROF FEE (OUTPATIENT)
Dept: HOSPITALIST | Facility: MEDICAL CENTER | Age: 53
End: 2018-02-20
Payer: MEDICAID

## 2018-02-20 ENCOUNTER — APPOINTMENT (OUTPATIENT)
Dept: RADIOLOGY | Facility: MEDICAL CENTER | Age: 53
End: 2018-02-20
Attending: EMERGENCY MEDICINE
Payer: MEDICAID

## 2018-02-20 ENCOUNTER — OFFICE VISIT (OUTPATIENT)
Dept: MEDICAL GROUP | Facility: MEDICAL CENTER | Age: 53
End: 2018-02-20
Attending: FAMILY MEDICINE
Payer: MEDICAID

## 2018-02-20 VITALS
OXYGEN SATURATION: 95 % | HEIGHT: 77 IN | SYSTOLIC BLOOD PRESSURE: 130 MMHG | BODY MASS INDEX: 27.39 KG/M2 | WEIGHT: 232 LBS | HEART RATE: 92 BPM | TEMPERATURE: 98 F | DIASTOLIC BLOOD PRESSURE: 95 MMHG | RESPIRATION RATE: 14 BRPM

## 2018-02-20 DIAGNOSIS — M47.22 OSTEOARTHRITIS OF SPINE WITH RADICULOPATHY, CERVICAL REGION: ICD-10-CM

## 2018-02-20 DIAGNOSIS — E11.40 TYPE 2 DIABETES MELLITUS WITH DIABETIC NEUROPATHY, WITHOUT LONG-TERM CURRENT USE OF INSULIN (HCC): ICD-10-CM

## 2018-02-20 DIAGNOSIS — R20.0 NUMBNESS ON LEFT SIDE: ICD-10-CM

## 2018-02-20 DIAGNOSIS — E08.21 DIABETES MELLITUS DUE TO UNDERLYING CONDITION WITH DIABETIC NEPHROPATHY, WITH LONG-TERM CURRENT USE OF INSULIN (HCC): ICD-10-CM

## 2018-02-20 DIAGNOSIS — R94.31 EKG ABNORMALITIES: ICD-10-CM

## 2018-02-20 DIAGNOSIS — R20.0 LEFT SIDED NUMBNESS: ICD-10-CM

## 2018-02-20 DIAGNOSIS — Z79.4 DIABETES MELLITUS DUE TO UNDERLYING CONDITION WITH DIABETIC NEPHROPATHY, WITH LONG-TERM CURRENT USE OF INSULIN (HCC): ICD-10-CM

## 2018-02-20 PROBLEM — G45.9 TIA (TRANSIENT ISCHEMIC ATTACK): Status: ACTIVE | Noted: 2018-02-20

## 2018-02-20 PROBLEM — E87.1 HYPONATREMIA: Status: ACTIVE | Noted: 2018-02-20

## 2018-02-20 LAB
ALBUMIN SERPL BCP-MCNC: 4.3 G/DL (ref 3.2–4.9)
ALBUMIN/GLOB SERPL: 1.7 G/DL
ALP SERPL-CCNC: 68 U/L (ref 30–99)
ALT SERPL-CCNC: 29 U/L (ref 2–50)
ANION GAP SERPL CALC-SCNC: 6 MMOL/L (ref 0–11.9)
APTT PPP: 24.8 SEC (ref 24.7–36)
AST SERPL-CCNC: 24 U/L (ref 12–45)
BASOPHILS # BLD AUTO: 0.4 % (ref 0–1.8)
BASOPHILS # BLD: 0.03 K/UL (ref 0–0.12)
BILIRUB SERPL-MCNC: 0.7 MG/DL (ref 0.1–1.5)
BNP SERPL-MCNC: 55 PG/ML (ref 0–100)
BUN SERPL-MCNC: 11 MG/DL (ref 8–22)
CALCIUM SERPL-MCNC: 9.5 MG/DL (ref 8.5–10.5)
CHLORIDE SERPL-SCNC: 102 MMOL/L (ref 96–112)
CO2 SERPL-SCNC: 26 MMOL/L (ref 20–33)
CREAT SERPL-MCNC: 1.18 MG/DL (ref 0.5–1.4)
EKG IMPRESSION: NORMAL
EOSINOPHIL # BLD AUTO: 0.09 K/UL (ref 0–0.51)
EOSINOPHIL NFR BLD: 1.2 % (ref 0–6.9)
ERYTHROCYTE [DISTWIDTH] IN BLOOD BY AUTOMATED COUNT: 43.8 FL (ref 35.9–50)
GLOBULIN SER CALC-MCNC: 2.5 G/DL (ref 1.9–3.5)
GLUCOSE BLD-MCNC: 142 MG/DL (ref 65–99)
GLUCOSE SERPL-MCNC: 89 MG/DL (ref 65–99)
HCT VFR BLD AUTO: 53.7 % (ref 42–52)
HGB BLD-MCNC: 17.2 G/DL (ref 14–18)
IMM GRANULOCYTES # BLD AUTO: 0.03 K/UL (ref 0–0.11)
IMM GRANULOCYTES NFR BLD AUTO: 0.4 % (ref 0–0.9)
INR PPP: 0.96 (ref 0.87–1.13)
LIPASE SERPL-CCNC: 142 U/L (ref 11–82)
LYMPHOCYTES # BLD AUTO: 2.73 K/UL (ref 1–4.8)
LYMPHOCYTES NFR BLD: 35 % (ref 22–41)
MCH RBC QN AUTO: 28.4 PG (ref 27–33)
MCHC RBC AUTO-ENTMCNC: 32 G/DL (ref 33.7–35.3)
MCV RBC AUTO: 88.6 FL (ref 81.4–97.8)
MONOCYTES # BLD AUTO: 0.51 K/UL (ref 0–0.85)
MONOCYTES NFR BLD AUTO: 6.5 % (ref 0–13.4)
NEUTROPHILS # BLD AUTO: 4.42 K/UL (ref 1.82–7.42)
NEUTROPHILS NFR BLD: 56.5 % (ref 44–72)
NRBC # BLD AUTO: 0 K/UL
NRBC BLD-RTO: 0 /100 WBC
PLATELET # BLD AUTO: 270 K/UL (ref 164–446)
PMV BLD AUTO: 9.5 FL (ref 9–12.9)
POTASSIUM SERPL-SCNC: 4 MMOL/L (ref 3.6–5.5)
PROT SERPL-MCNC: 6.8 G/DL (ref 6–8.2)
PROTHROMBIN TIME: 12.5 SEC (ref 12–14.6)
RBC # BLD AUTO: 6.06 M/UL (ref 4.7–6.1)
SODIUM SERPL-SCNC: 134 MMOL/L (ref 135–145)
TROPONIN I SERPL-MCNC: 0.02 NG/ML (ref 0–0.04)
WBC # BLD AUTO: 7.8 K/UL (ref 4.8–10.8)

## 2018-02-20 PROCEDURE — 96372 THER/PROPH/DIAG INJ SC/IM: CPT | Mod: XU

## 2018-02-20 PROCEDURE — 85025 COMPLETE CBC W/AUTO DIFF WBC: CPT

## 2018-02-20 PROCEDURE — 99212 OFFICE O/P EST SF 10 MIN: CPT | Performed by: FAMILY MEDICINE

## 2018-02-20 PROCEDURE — G0378 HOSPITAL OBSERVATION PER HR: HCPCS | Mod: EDC

## 2018-02-20 PROCEDURE — 93005 ELECTROCARDIOGRAM TRACING: CPT | Mod: EDC

## 2018-02-20 PROCEDURE — 36415 COLL VENOUS BLD VENIPUNCTURE: CPT | Mod: EDC

## 2018-02-20 PROCEDURE — 93005 ELECTROCARDIOGRAM TRACING: CPT | Performed by: FAMILY MEDICINE

## 2018-02-20 PROCEDURE — 700101 HCHG RX REV CODE 250: Performed by: INTERNAL MEDICINE

## 2018-02-20 PROCEDURE — 93306 TTE W/DOPPLER COMPLETE: CPT | Mod: 26 | Performed by: INTERNAL MEDICINE

## 2018-02-20 PROCEDURE — 700102 HCHG RX REV CODE 250 W/ 637 OVERRIDE(OP): Performed by: INTERNAL MEDICINE

## 2018-02-20 PROCEDURE — 85610 PROTHROMBIN TIME: CPT

## 2018-02-20 PROCEDURE — 83880 ASSAY OF NATRIURETIC PEPTIDE: CPT

## 2018-02-20 PROCEDURE — G0378 HOSPITAL OBSERVATION PER HR: HCPCS

## 2018-02-20 PROCEDURE — 84484 ASSAY OF TROPONIN QUANT: CPT

## 2018-02-20 PROCEDURE — 99285 EMERGENCY DEPT VISIT HI MDM: CPT | Mod: EDC

## 2018-02-20 PROCEDURE — 70450 CT HEAD/BRAIN W/O DYE: CPT

## 2018-02-20 PROCEDURE — 93005 ELECTROCARDIOGRAM TRACING: CPT | Mod: EDC | Performed by: EMERGENCY MEDICINE

## 2018-02-20 PROCEDURE — 99214 OFFICE O/P EST MOD 30 MIN: CPT | Performed by: FAMILY MEDICINE

## 2018-02-20 PROCEDURE — 80053 COMPREHEN METABOLIC PANEL: CPT

## 2018-02-20 PROCEDURE — 83690 ASSAY OF LIPASE: CPT

## 2018-02-20 PROCEDURE — A9270 NON-COVERED ITEM OR SERVICE: HCPCS | Performed by: INTERNAL MEDICINE

## 2018-02-20 PROCEDURE — 96374 THER/PROPH/DIAG INJ IV PUSH: CPT

## 2018-02-20 PROCEDURE — 82962 GLUCOSE BLOOD TEST: CPT

## 2018-02-20 PROCEDURE — 700111 HCHG RX REV CODE 636 W/ 250 OVERRIDE (IP): Performed by: INTERNAL MEDICINE

## 2018-02-20 PROCEDURE — 85730 THROMBOPLASTIN TIME PARTIAL: CPT

## 2018-02-20 PROCEDURE — 71045 X-RAY EXAM CHEST 1 VIEW: CPT

## 2018-02-20 PROCEDURE — 700105 HCHG RX REV CODE 258: Performed by: INTERNAL MEDICINE

## 2018-02-20 PROCEDURE — 304561 HCHG STAT O2: Mod: EDC

## 2018-02-20 PROCEDURE — 99220 PR INITIAL OBSERVATION CARE,LEVL III: CPT | Performed by: INTERNAL MEDICINE

## 2018-02-20 RX ORDER — PROMETHAZINE HYDROCHLORIDE 25 MG/1
12.5-25 TABLET ORAL EVERY 4 HOURS PRN
Status: DISCONTINUED | OUTPATIENT
Start: 2018-02-20 | End: 2018-02-21 | Stop reason: HOSPADM

## 2018-02-20 RX ORDER — LORAZEPAM 1 MG/1
0.5 TABLET ORAL EVERY 6 HOURS PRN
Status: DISCONTINUED | OUTPATIENT
Start: 2018-02-20 | End: 2018-02-21 | Stop reason: HOSPADM

## 2018-02-20 RX ORDER — LORAZEPAM 2 MG/ML
0.5 INJECTION INTRAMUSCULAR EVERY 6 HOURS PRN
Status: DISCONTINUED | OUTPATIENT
Start: 2018-02-20 | End: 2018-02-21 | Stop reason: HOSPADM

## 2018-02-20 RX ORDER — PIOGLITAZONEHYDROCHLORIDE 30 MG/1
30 TABLET ORAL DAILY
Status: DISCONTINUED | OUTPATIENT
Start: 2018-02-20 | End: 2018-02-20

## 2018-02-20 RX ORDER — AMOXICILLIN 250 MG
2 CAPSULE ORAL 2 TIMES DAILY
Status: DISCONTINUED | OUTPATIENT
Start: 2018-02-20 | End: 2018-02-21 | Stop reason: HOSPADM

## 2018-02-20 RX ORDER — TIOTROPIUM BROMIDE 18 UG/1
1 CAPSULE ORAL; RESPIRATORY (INHALATION) DAILY
Status: DISCONTINUED | OUTPATIENT
Start: 2018-02-21 | End: 2018-02-21 | Stop reason: HOSPADM

## 2018-02-20 RX ORDER — POLYETHYLENE GLYCOL 3350 17 G/17G
1 POWDER, FOR SOLUTION ORAL
Status: DISCONTINUED | OUTPATIENT
Start: 2018-02-20 | End: 2018-02-21 | Stop reason: HOSPADM

## 2018-02-20 RX ORDER — PROMETHAZINE HYDROCHLORIDE 12.5 MG/1
12.5-25 SUPPOSITORY RECTAL EVERY 4 HOURS PRN
Status: DISCONTINUED | OUTPATIENT
Start: 2018-02-20 | End: 2018-02-21 | Stop reason: HOSPADM

## 2018-02-20 RX ORDER — NICOTINE 21 MG/24HR
21 PATCH, TRANSDERMAL 24 HOURS TRANSDERMAL
Status: DISCONTINUED | OUTPATIENT
Start: 2018-02-20 | End: 2018-02-21 | Stop reason: HOSPADM

## 2018-02-20 RX ORDER — BISACODYL 10 MG
10 SUPPOSITORY, RECTAL RECTAL
Status: DISCONTINUED | OUTPATIENT
Start: 2018-02-20 | End: 2018-02-21 | Stop reason: HOSPADM

## 2018-02-20 RX ORDER — HYDRALAZINE HYDROCHLORIDE 20 MG/ML
10 INJECTION INTRAMUSCULAR; INTRAVENOUS
Status: DISCONTINUED | OUTPATIENT
Start: 2018-02-20 | End: 2018-02-20

## 2018-02-20 RX ORDER — DEXTROSE MONOHYDRATE 25 G/50ML
25 INJECTION, SOLUTION INTRAVENOUS
Status: DISCONTINUED | OUTPATIENT
Start: 2018-02-20 | End: 2018-02-21 | Stop reason: HOSPADM

## 2018-02-20 RX ORDER — ATORVASTATIN CALCIUM 80 MG/1
80 TABLET, FILM COATED ORAL EVERY EVENING
Status: DISCONTINUED | OUTPATIENT
Start: 2018-02-20 | End: 2018-02-21 | Stop reason: HOSPADM

## 2018-02-20 RX ORDER — CYCLOBENZAPRINE HCL 10 MG
10 TABLET ORAL DAILY
COMMUNITY
End: 2018-07-16

## 2018-02-20 RX ORDER — RANITIDINE 150 MG/1
150 TABLET ORAL 2 TIMES DAILY
Status: DISCONTINUED | OUTPATIENT
Start: 2018-02-20 | End: 2018-02-20

## 2018-02-20 RX ORDER — LISINOPRIL 20 MG/1
40 TABLET ORAL DAILY
Status: DISCONTINUED | OUTPATIENT
Start: 2018-02-20 | End: 2018-02-21 | Stop reason: HOSPADM

## 2018-02-20 RX ORDER — ALBUTEROL SULFATE 90 UG/1
2 AEROSOL, METERED RESPIRATORY (INHALATION) EVERY 6 HOURS PRN
Status: DISCONTINUED | OUTPATIENT
Start: 2018-02-20 | End: 2018-02-21 | Stop reason: HOSPADM

## 2018-02-20 RX ORDER — ONDANSETRON 4 MG/1
4 TABLET, ORALLY DISINTEGRATING ORAL EVERY 4 HOURS PRN
Status: DISCONTINUED | OUTPATIENT
Start: 2018-02-20 | End: 2018-02-21 | Stop reason: HOSPADM

## 2018-02-20 RX ORDER — LABETALOL HYDROCHLORIDE 5 MG/ML
10 INJECTION, SOLUTION INTRAVENOUS EVERY 4 HOURS PRN
Status: DISCONTINUED | OUTPATIENT
Start: 2018-02-20 | End: 2018-02-20

## 2018-02-20 RX ORDER — INSULIN GLARGINE 100 [IU]/ML
50 INJECTION, SOLUTION SUBCUTANEOUS EVERY EVENING
Status: DISCONTINUED | OUTPATIENT
Start: 2018-02-20 | End: 2018-02-21 | Stop reason: HOSPADM

## 2018-02-20 RX ORDER — BUDESONIDE AND FORMOTEROL FUMARATE DIHYDRATE 160; 4.5 UG/1; UG/1
2 AEROSOL RESPIRATORY (INHALATION)
COMMUNITY
End: 2018-06-21 | Stop reason: SDUPTHER

## 2018-02-20 RX ORDER — FAMOTIDINE 20 MG/1
20 TABLET, FILM COATED ORAL 2 TIMES DAILY
Status: DISCONTINUED | OUTPATIENT
Start: 2018-02-20 | End: 2018-02-21 | Stop reason: HOSPADM

## 2018-02-20 RX ORDER — ACETAMINOPHEN 325 MG/1
650 TABLET ORAL EVERY 6 HOURS PRN
Status: DISCONTINUED | OUTPATIENT
Start: 2018-02-20 | End: 2018-02-21 | Stop reason: HOSPADM

## 2018-02-20 RX ORDER — AMLODIPINE BESYLATE 5 MG/1
10 TABLET ORAL DAILY
Status: DISCONTINUED | OUTPATIENT
Start: 2018-02-20 | End: 2018-02-21 | Stop reason: HOSPADM

## 2018-02-20 RX ORDER — CLOPIDOGREL BISULFATE 75 MG/1
75 TABLET ORAL DAILY
Status: DISCONTINUED | OUTPATIENT
Start: 2018-02-20 | End: 2018-02-21 | Stop reason: HOSPADM

## 2018-02-20 RX ORDER — SODIUM CHLORIDE 9 MG/ML
INJECTION, SOLUTION INTRAVENOUS CONTINUOUS
Status: DISCONTINUED | OUTPATIENT
Start: 2018-02-20 | End: 2018-02-21

## 2018-02-20 RX ORDER — BUDESONIDE AND FORMOTEROL FUMARATE DIHYDRATE 160; 4.5 UG/1; UG/1
2 AEROSOL RESPIRATORY (INHALATION)
Status: DISCONTINUED | OUTPATIENT
Start: 2018-02-20 | End: 2018-02-20

## 2018-02-20 RX ORDER — CYCLOBENZAPRINE HCL 10 MG
10 TABLET ORAL DAILY
Status: DISCONTINUED | OUTPATIENT
Start: 2018-02-20 | End: 2018-02-21 | Stop reason: HOSPADM

## 2018-02-20 RX ORDER — LABETALOL HYDROCHLORIDE 5 MG/ML
10 INJECTION, SOLUTION INTRAVENOUS EVERY 4 HOURS PRN
Status: DISCONTINUED | OUTPATIENT
Start: 2018-02-20 | End: 2018-02-21 | Stop reason: HOSPADM

## 2018-02-20 RX ORDER — ONDANSETRON 2 MG/ML
4 INJECTION INTRAMUSCULAR; INTRAVENOUS EVERY 4 HOURS PRN
Status: DISCONTINUED | OUTPATIENT
Start: 2018-02-20 | End: 2018-02-21 | Stop reason: HOSPADM

## 2018-02-20 RX ORDER — BUDESONIDE AND FORMOTEROL FUMARATE DIHYDRATE 160; 4.5 UG/1; UG/1
2 AEROSOL RESPIRATORY (INHALATION) 2 TIMES DAILY
Status: DISCONTINUED | OUTPATIENT
Start: 2018-02-20 | End: 2018-02-21 | Stop reason: HOSPADM

## 2018-02-20 RX ORDER — TIOTROPIUM BROMIDE 18 UG/1
1 CAPSULE ORAL; RESPIRATORY (INHALATION)
Status: DISCONTINUED | OUTPATIENT
Start: 2018-02-20 | End: 2018-02-20

## 2018-02-20 RX ADMIN — ENOXAPARIN SODIUM 40 MG: 100 INJECTION SUBCUTANEOUS at 18:21

## 2018-02-20 RX ADMIN — FAMOTIDINE 20 MG: 20 TABLET, FILM COATED ORAL at 20:22

## 2018-02-20 RX ADMIN — LISINOPRIL 40 MG: 20 TABLET ORAL at 18:21

## 2018-02-20 RX ADMIN — CYCLOBENZAPRINE 10 MG: 10 TABLET, FILM COATED ORAL at 20:22

## 2018-02-20 RX ADMIN — ATORVASTATIN CALCIUM 80 MG: 80 TABLET, FILM COATED ORAL at 20:22

## 2018-02-20 RX ADMIN — SODIUM CHLORIDE: 9 INJECTION, SOLUTION INTRAVENOUS at 18:21

## 2018-02-20 RX ADMIN — AMLODIPINE BESYLATE 10 MG: 5 TABLET ORAL at 18:20

## 2018-02-20 RX ADMIN — BUDESONIDE AND FORMOTEROL FUMARATE DIHYDRATE 2 PUFF: 160; 4.5 AEROSOL RESPIRATORY (INHALATION) at 20:35

## 2018-02-20 RX ADMIN — LABETALOL HYDROCHLORIDE 10 MG: 5 INJECTION, SOLUTION INTRAVENOUS at 20:23

## 2018-02-20 RX ADMIN — CLOPIDOGREL 75 MG: 75 TABLET, FILM COATED ORAL at 18:20

## 2018-02-20 ASSESSMENT — ENCOUNTER SYMPTOMS
HEARTBURN: 0
FALLS: 0
NAUSEA: 0
CHILLS: 0
DIZZINESS: 0
BACK PAIN: 1
DOUBLE VISION: 0
DIARRHEA: 0
NAUSEA: 1
TREMORS: 0
PALPITATIONS: 0
DEPRESSION: 0
SHORTNESS OF BREATH: 0
CONSTIPATION: 0
MEMORY LOSS: 0
HEADACHES: 1
SPEECH CHANGE: 0
INSOMNIA: 0
VOMITING: 0
SEIZURES: 0
NECK PAIN: 1
COUGH: 0
VOMITING: 0
SENSORY CHANGE: 1
WEAKNESS: 0
TINGLING: 1
BLOOD IN STOOL: 0
FEVER: 0
SPUTUM PRODUCTION: 0
ORTHOPNEA: 0
ABDOMINAL PAIN: 0
HALLUCINATIONS: 0
CHILLS: 0
BLURRED VISION: 0
WHEEZING: 0
COUGH: 0
FOCAL WEAKNESS: 0
TINGLING: 1
HEMOPTYSIS: 0
FEVER: 0
FLANK PAIN: 0
NERVOUS/ANXIOUS: 0
ABDOMINAL PAIN: 0
CLAUDICATION: 0
BACK PAIN: 0
SHORTNESS OF BREATH: 0
MYALGIAS: 0
LOSS OF CONSCIOUSNESS: 0
NECK PAIN: 1
PALPITATIONS: 0
DIAPHORESIS: 0

## 2018-02-20 ASSESSMENT — PATIENT HEALTH QUESTIONNAIRE - PHQ9
1. LITTLE INTEREST OR PLEASURE IN DOING THINGS: NOT AT ALL
2. FEELING DOWN, DEPRESSED, IRRITABLE, OR HOPELESS: NOT AT ALL
SUM OF ALL RESPONSES TO PHQ9 QUESTIONS 1 AND 2: 0
SUM OF ALL RESPONSES TO PHQ QUESTIONS 1-9: 0

## 2018-02-20 ASSESSMENT — COPD QUESTIONNAIRES
DURING THE PAST 4 WEEKS HOW MUCH DID YOU FEEL SHORT OF BREATH: NONE/LITTLE OF THE TIME
DO YOU EVER COUGH UP ANY MUCUS OR PHLEGM?: NO/ONLY WITH OCCASIONAL COLDS OR INFECTIONS
HAVE YOU SMOKED AT LEAST 100 CIGARETTES IN YOUR ENTIRE LIFE: YES
COPD SCREENING SCORE: 3

## 2018-02-20 ASSESSMENT — LIFESTYLE VARIABLES
EVER_SMOKED: YES
ALCOHOL_USE: NO
EVER_SMOKED: YES
SUBSTANCE_ABUSE: 0
DO YOU DRINK ALCOHOL: NO

## 2018-02-20 ASSESSMENT — PAIN SCALES - GENERAL
PAINLEVEL_OUTOF10: 0
PAINLEVEL_OUTOF10: 0
PAINLEVEL_OUTOF10: 7

## 2018-02-20 NOTE — ED NOTES
Unable to obtain blood from IV or peripheral stick x1. Lab notified of need for blood draw and states understanding.

## 2018-02-20 NOTE — ED TRIAGE NOTES
Pt presents to ED via EMS for c/o L arm, L leg and L foot numbness. Pt states symptoms started yesterday evening. Pt was seen at PCP today and sent over for an abnormal EKG per EMS. Pt denies back pain, CP or ABD pain currently.

## 2018-02-20 NOTE — ED PROVIDER NOTES
"ED Provider Note    Scribed for Asif Lee M.D. by Jcarlos Eastman. 2/20/2018, 1:37 PM.    Primary care provider: Eleuterio Lara M.D.  Means of arrival: EMS  History obtained from: Patient  History limited by: None    CHIEF COMPLAINT  Chief Complaint   Patient presents with   • Numbness     L arm, L leg, L foot       HPI  Wagner Kenney is a 52 y.o. male who presents to the Emergency Department complaining of numbness and tingling located along his entire left side. He first noticed the tingling his left finger tips at 8:00 PM yesterday evening. After urinating this morning he noticed tingling along his left upper and lower extremities that was moderate at onset, but has been gradually decreasing in severity. He was seen by his primary care this morning and his EKG revealed an abnormality prompting EMS to be called. The patient reports associated left side weakness he describes as, \"decreased coordination\" as well as a headache that began this morning. In his primary care's office this morning he noticed spots in his vision, which have resolved.  He denies chest pain, shortness of breath, nausea, vomiting, dysarthria, or diarrhea. Patient reports that he had a very stressful days yesterday. The patient reports a history of hypertension and diabetes mellitus. He states that his diabetes mellitus is being followed by an endocrinologist and his blood glucose is well-controlled. His blood glucose was 85 upno waking this morning and 120 at his primary care's office this morning. He denies a history of migraines or stroke. He denies consuming alcohol or using illicit drugs. The patient is allergic to metformin and Aspirin. He experiences oral swelling, shortness of breath, and nausea when taking aspirin.    REVIEW OF SYSTEMS  Pertinent positives include left sided numbness and tingling, abnormal EKG, left sided weakness, spots in vision (resolved), and headache. Pertinent negatives include chest pain, " shortness of breath, nausea, vomiting, dysarthria, or diarrhea. All other systems negative.  C    PAST MEDICAL HISTORY   has a past medical history of Chronic LBP; Chronic shoulder pain; COPD (chronic obstructive pulmonary disease) (CMS-[facility]) (3/31/10); DM (diabetes mellitus) (CMS-HCC); HTN (hypertension); and Kidney disease.  No migraines or stroke    SURGICAL HISTORY   has a past surgical history that includes other orthopedic surgery (knee).    SOCIAL HISTORY  Social History   Substance Use Topics   • Smoking status: Current Every Day Smoker     Packs/day: 0.50     Years: 25.00     Types: Cigarettes     Last attempt to quit: 5/26/2012   • Smokeless tobacco: Never Used   • Alcohol use 0.0 - 0.6 oz/week      History   Drug Use No       FAMILY HISTORY  Family History   Problem Relation Age of Onset   • Hypertension Mother    • Diabetes Father    • Hypertension Father    • Diabetes Sister        CURRENT MEDICATIONS  Home Medications     Reviewed by Judit Polanco (Pharmacy Tech) on 02/20/18 at 1616  Med List Status: Complete   Medication Last Dose Status   albuterol 108 (90 Base) MCG/ACT Aero Soln inhalation aerosol 4-5 days Active   amlodipine (NORVASC) 10 MG Tab 2/19/2018 Active   atorvastatin (LIPITOR) 20 MG Tab 2/19/2018 Active   budesonide-formoterol (SYMBICORT) 160-4.5 MCG/ACT Aerosol 2 days Active   budesonide-formoterol (SYMBICORT) 160-4.5 MCG/ACT Aerosol 2-3 days Active   cyclobenzaprine (FLEXERIL) 10 MG Tab 2/19/2018 Active   Insulin Degludec (TRESIBA FLEXTOUCH) 200 UNIT/ML Solution Pen-injector 2/19/2018 Active   JARDIANCE 25 MG Tab 2/20/2018 Active   lisinopril (PRINIVIL, ZESTRIL) 40 MG tablet 2/19/2018 Active   nicotine (NICODERM) 14 MG/24HR PATCH 24 HR <week Active   pioglitazone (ACTOS) 30 MG Tab 2/20/2018 Active   ranitidine (ZANTAC) 150 MG Tab 2/19/2018 Active   SPIRIVA HANDIHALER 18 MCG Cap 2/19/2018 Active   VICTOZA 18 MG/3ML Solution Pen-injector injection 2/19/2018 Active           "      ALLERGIES  Allergies   Allergen Reactions   • Apple Swelling     Per patient: swelling of mouth and jittery feeling.  Apple allergy to raw apples; apple juice and applesauce okay per patient   • Asa [Aspirin]      \"funny taste in my mouth. My stomach has an 'empty' feeling.\"  \"throat closing\"   • Metformin      Pt states he \"cramps up\"       PHYSICAL EXAM  VITAL SIGNS: /76   Pulse 87   Temp 37.1 °C (98.7 °F)   Resp 20   Ht 1.956 m (6' 5\")   Wt 105.2 kg (232 lb)   BMI 27.51 kg/m²     Constitutional: Well developed, Well nourished, No acute distress.   HENT: Normocephalic, Atraumatic, Oropharynx moist. Nasal canula in place.  Eyes: Conjunctiva normal, No discharge.   Neck: Supple, No stridor, No carotid bruit   Cardiovascular: Normal heart rate, Normal rhythm, No murmurs, equal pulses.   Pulmonary: Normal breath sounds, No respiratory distress, No wheezing, No rales, No rhonchi.  Chest: No chest wall tenderness or deformity.   Abdomen:Soft, No tenderness, No masses, no rebound, no guarding.   Musculoskeletal: No major deformities noted, No tenderness.   Skin: Warm, Dry, No erythema, No rash.   Neurologic: Alert & oriented x 3, Normal finger to nose, Normal cranial nerves II-XII, Slight left hand pronator drift. Equal strength in upper and lower extremities bilaterally.  Heel to shin negative, He reports decreased sensation to the dorsum of the left hand and medial left lower extremity.   Psychiatric: Affect normal, Judgment normal, Mood normal.     LABS  Results for orders placed or performed during the hospital encounter of 02/20/18   Troponin   Result Value Ref Range    Troponin I 0.02 0.00 - 0.04 ng/mL   Btype Natriuretic Peptide   Result Value Ref Range    B Natriuretic Peptide 55 0 - 100 pg/mL   CBC with Differential   Result Value Ref Range    WBC 7.8 4.8 - 10.8 K/uL    RBC 6.06 4.70 - 6.10 M/uL    Hemoglobin 17.2 14.0 - 18.0 g/dL    Hematocrit 53.7 (H) 42.0 - 52.0 %    MCV 88.6 81.4 - 97.8 fL "    MCH 28.4 27.0 - 33.0 pg    MCHC 32.0 (L) 33.7 - 35.3 g/dL    RDW 43.8 35.9 - 50.0 fL    Platelet Count 270 164 - 446 K/uL    MPV 9.5 9.0 - 12.9 fL    Neutrophils-Polys 56.50 44.00 - 72.00 %    Lymphocytes 35.00 22.00 - 41.00 %    Monocytes 6.50 0.00 - 13.40 %    Eosinophils 1.20 0.00 - 6.90 %    Basophils 0.40 0.00 - 1.80 %    Immature Granulocytes 0.40 0.00 - 0.90 %    Nucleated RBC 0.00 /100 WBC    Neutrophils (Absolute) 4.42 1.82 - 7.42 K/uL    Lymphs (Absolute) 2.73 1.00 - 4.80 K/uL    Monos (Absolute) 0.51 0.00 - 0.85 K/uL    Eos (Absolute) 0.09 0.00 - 0.51 K/uL    Baso (Absolute) 0.03 0.00 - 0.12 K/uL    Immature Granulocytes (abs) 0.03 0.00 - 0.11 K/uL    NRBC (Absolute) 0.00 K/uL   Complete Metabolic Panel (CMP)   Result Value Ref Range    Sodium 134 (L) 135 - 145 mmol/L    Potassium 4.0 3.6 - 5.5 mmol/L    Chloride 102 96 - 112 mmol/L    Co2 26 20 - 33 mmol/L    Anion Gap 6.0 0.0 - 11.9    Glucose 89 65 - 99 mg/dL    Bun 11 8 - 22 mg/dL    Creatinine 1.18 0.50 - 1.40 mg/dL    Calcium 9.5 8.5 - 10.5 mg/dL    AST(SGOT) 24 12 - 45 U/L    ALT(SGPT) 29 2 - 50 U/L    Alkaline Phosphatase 68 30 - 99 U/L    Total Bilirubin 0.7 0.1 - 1.5 mg/dL    Albumin 4.3 3.2 - 4.9 g/dL    Total Protein 6.8 6.0 - 8.2 g/dL    Globulin 2.5 1.9 - 3.5 g/dL    A-G Ratio 1.7 g/dL   Prothrombin Time   Result Value Ref Range    PT 12.5 12.0 - 14.6 sec    INR 0.96 0.87 - 1.13   APTT   Result Value Ref Range    APTT 24.8 24.7 - 36.0 sec   Lipase   Result Value Ref Range    Lipase 142 (H) 11 - 82 U/L   ESTIMATED GFR   Result Value Ref Range    GFR If African American >60 >60 mL/min/1.73 m 2    GFR If Non African American >60 >60 mL/min/1.73 m 2   ACCU-CHEK GLUCOSE   Result Value Ref Range    Glucose - Accu-Ck 142 (H) 65 - 99 mg/dL   EKG (ER)   Result Value Ref Range    Report       [facility] Emergency Dept.    Test Date:  2018-02-20  Pt Name:    STEFFEN JOE                 Department: ER  MRN:        [id number]                       Room:       [facility]  Gender:     Male                         Technician: [id number]  :        1965                   Requested By:ER TRIAGE PROTOCOL  Order #:    [id number]                    Reading MD:    Measurements  Intervals                                Axis  Rate:       77                           P:          66  NY:         164                          QRS:        -39  QRSD:       92                           T:          8  QT:         384  QTc:        435    Interpretive Statements  SINUS RHYTHM  INFERIOR INFARCT, AGE INDETERMINATE  BORDERLINE ST ELEVATION, ANTERIOR LEADS  Compared to ECG 2017 05:52:31  No significant changes       All labs reviewed by me.    EKG  12 Lead EKG interpreted by me shows a normal sinus rhythm at a rate of 77. Leftward axis. Less than 1 mm ST elevation in V1, V2 I think this appears to be J-point elevation. T-wave inversion in lead 3 Q waves in II, III, and F aVF likely old inferior infarct.. Old EKG from 3/12/2017 shows no significant changes. 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      CT-HEAD W/O   Final Result      No acute intracranial abnormality is identified.      Echocardiogram Comp W/O cont    (Results Pending)   CAROTID DUPLEX ([facility] Only)    (Results Pending)   MR-BRAIN-W/O    (Results Pending)   MR-CERVICAL SPINE-WITH & W/O    (Results Pending)   MR-MRA HEAD-W/O    (Results Pending)     The radiologist's interpretation of all radiological studies have been reviewed by me.    COURSE & MEDICAL DECISION MAKING  Pertinent Labs & Imaging studies reviewed. (See chart for details)    1:37 PM - Patient seen and examined at bedside. Ordered CT head without contrast, DX chest limited 1 view, estimated GFR, Troponin, BNP, CBC with differential, CMP, PTT, APTT, Lipase, and EKG  to evaluate his symptoms. The differential diagnoses include but are not limited to: Stroke vs electrolyte abnormality vs dehydration     2:56 PM Recheck: Patient re-evaluated at [facility]. Patient reports that he is still experiencing numbness. Discussed patient's condition and treatment plan including the need to consult with neurology. Patient's radiology results discussed. The patient understood and is in agreement.    2:58 PM Paged Neurology.    3:27 PM I discussed the patient's case and the above findings with Dr. Faye (Neurology) who agrees to evaluate the patient and would like the patient admitted with an MRI to rule out stroke because his ABCD score is greater than 3.    3:28 PM Paged Hospitalist.    3:29 PM Recheck: Patient re-evaluated at [facility]. Patient is still experiencing numbness. Discussed patient's condition and treatment plan including the need for admittance with an MRI. The patient understood and is in agreement.     3:44 PM I discussed the patient's case and the above findings with Dr. Mcadams (Hospitalist) who agrees to admit the patient and will transfer care of the patient at this time.    Medical Decision Making: At this point time and concern the patient may have had a small lacunar infarct with left-sided numbness. He does have some slight pronator drift on left hand. Given the patient's risk factors she will be admitted for further workup for possible stroke with MRI    DISPOSITION:  Patient will be admitted to Dr. Mcadams (Hospitalist) in guarded condition.     FINAL IMPRESSION  1. Left sided numbness          Jcarlos RIVERO (Karlaibmaximiliano), am scribing for, and in the presence of, Asif Lee M.D.    Electronically signed by: Jcarlos Eastman (Enrike), 2/20/2018    Asif RIVERO M.D. personally performed the services described in this documentation, as scribed by Jcarlos Eastman in my presence, and it is both accurate and complete.    The note accurately  reflects work and decisions made by me.  Asif Lee  2/20/2018  11:37 PM

## 2018-02-20 NOTE — PROGRESS NOTES
"Subjective:      Wagner Kenney is a 52 y.o. male who presents with Diabetes            Patient 52-year-old male here for left arm numbness and tingling that suddenly started last evening and has progressively worsened since that time. He states that he is not having any chest pain or shortness of breath but does not feel right. He does have a history of diabetes, hyperlipidemia and right-sided heart failure.  An EKG was done today with a left axis deviation and right bundle branch block in the inferior leads. Patient states that he has never had an MI in the past. Patient will be sent to the ER for further evaluation of the new changes to his EKG as well as left arm numbness and tingling. Patient [facility]        Review of Systems   Constitutional: Negative for chills and fever.   HENT: Negative for hearing loss and tinnitus.    Respiratory: Negative for cough, sputum production and shortness of breath.    Cardiovascular: Negative for chest pain and palpitations.   Gastrointestinal: Positive for nausea. Negative for abdominal pain and vomiting.   Musculoskeletal: Positive for back pain and neck pain.   Neurological: Positive for tingling.          Objective:     /95   Pulse 92   Temp 36.7 °C (98 °F)   Resp 14   Ht 1.956 m (6' 5\")   Wt 105.2 kg (232 lb)   SpO2 95%   BMI 27.51 kg/m²      Physical Exam   Constitutional: He is oriented to person, place, and time. He appears well-developed and well-nourished.   HENT:   Head: Normocephalic and atraumatic.   Cardiovascular: Normal rate, regular rhythm and normal heart sounds.  Exam reveals no friction rub.    No murmur heard.  Pulmonary/Chest: Effort normal and breath sounds normal. No respiratory distress. He has no wheezes. He has no rales.   Abdominal: Soft. Bowel sounds are normal. He exhibits no distension. There is no tenderness.   Neurological: He is alert and oriented to person, place, and " time.   Nursing note and vitals reviewed.              Assessment/Plan:     1. Numbness on left side  Will get EKG today and will send for EMG, also referral to cardiology will also be made today.  - REFERRAL TO NEURODIAGNOSTICS (EEG,EP,EMG/NCS/DBS) Modality Requested: EMG/NCS-Comment Extremities    2. Diabetes 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, with long-term current use of insulin (CMS-HCC)  Will have him continue to take his medication as directed. Will continue to follow.    3. Type 2 diabetes mellitus with diabetic neuropathy, without long-term current use of insulin (CMS-HCC)  See above plan.  - REFERRAL TO NEURODIAGNOSTICS (EEG,EP,EMG/NCS/DBS) Modality Requested: EMG/NCS-Comment Extremities    4. Osteoarthritis of spine with radiculopathy, cervical region  See above plan.  - REFERRAL TO NEURODIAGNOSTICS (EEG,EP,EMG/NCS/DBS) Modality Requested: EMG/NCS-Comment Extremities

## 2018-02-21 ENCOUNTER — APPOINTMENT (OUTPATIENT)
Dept: RADIOLOGY | Facility: MEDICAL CENTER | Age: 53
End: 2018-02-21
Attending: INTERNAL MEDICINE
Payer: MEDICAID

## 2018-02-21 ENCOUNTER — APPOINTMENT (OUTPATIENT)
Dept: RADIOLOGY | Facility: MEDICAL CENTER | Age: 53
End: 2018-02-21
Attending: NURSE PRACTITIONER
Payer: MEDICAID

## 2018-02-21 VITALS
TEMPERATURE: 98.2 F | OXYGEN SATURATION: 99 % | HEART RATE: 84 BPM | BODY MASS INDEX: 27.39 KG/M2 | DIASTOLIC BLOOD PRESSURE: 85 MMHG | SYSTOLIC BLOOD PRESSURE: 131 MMHG | HEIGHT: 77 IN | RESPIRATION RATE: 19 BRPM | WEIGHT: 232 LBS

## 2018-02-21 LAB
ALBUMIN SERPL BCP-MCNC: 3.5 G/DL (ref 3.2–4.9)
ALBUMIN/GLOB SERPL: 1.3 G/DL
ALP SERPL-CCNC: 52 U/L (ref 30–99)
ALT SERPL-CCNC: 24 U/L (ref 2–50)
AMPHET UR QL SCN: NEGATIVE
ANION GAP SERPL CALC-SCNC: 6 MMOL/L (ref 0–11.9)
AST SERPL-CCNC: 32 U/L (ref 12–45)
BARBITURATES UR QL SCN: NEGATIVE
BASOPHILS # BLD AUTO: 0.4 % (ref 0–1.8)
BASOPHILS # BLD: 0.03 K/UL (ref 0–0.12)
BENZODIAZ UR QL SCN: NEGATIVE
BILIRUB SERPL-MCNC: 0.4 MG/DL (ref 0.1–1.5)
BUN SERPL-MCNC: 15 MG/DL (ref 8–22)
BZE UR QL SCN: NEGATIVE
CALCIUM SERPL-MCNC: 8.7 MG/DL (ref 8.5–10.5)
CANNABINOIDS UR QL SCN: POSITIVE
CHLORIDE SERPL-SCNC: 106 MMOL/L (ref 96–112)
CHOLEST SERPL-MCNC: 148 MG/DL (ref 100–199)
CO2 SERPL-SCNC: 22 MMOL/L (ref 20–33)
CREAT SERPL-MCNC: 1 MG/DL (ref 0.5–1.4)
EOSINOPHIL # BLD AUTO: 0.13 K/UL (ref 0–0.51)
EOSINOPHIL NFR BLD: 1.9 % (ref 0–6.9)
ERYTHROCYTE [DISTWIDTH] IN BLOOD BY AUTOMATED COUNT: 42 FL (ref 35.9–50)
EST. AVERAGE GLUCOSE BLD GHB EST-MCNC: 286 MG/DL
GLOBULIN SER CALC-MCNC: 2.6 G/DL (ref 1.9–3.5)
GLUCOSE BLD-MCNC: 117 MG/DL (ref 65–99)
GLUCOSE BLD-MCNC: 124 MG/DL (ref 65–99)
GLUCOSE SERPL-MCNC: 128 MG/DL (ref 65–99)
HBA1C MFR BLD: 11.6 % (ref 0–5.6)
HCT VFR BLD AUTO: 47.2 % (ref 42–52)
HDLC SERPL-MCNC: 41 MG/DL
HGB BLD-MCNC: 15.6 G/DL (ref 14–18)
IMM GRANULOCYTES # BLD AUTO: 0.02 K/UL (ref 0–0.11)
IMM GRANULOCYTES NFR BLD AUTO: 0.3 % (ref 0–0.9)
LDLC SERPL CALC-MCNC: 56 MG/DL
LV EJECT FRACT  99904: 60
LV EJECT FRACT MOD 2C 99903: 49.8
LV EJECT FRACT MOD 4C 99902: 58.21
LV EJECT FRACT MOD BP 99901: 55.54
LYMPHOCYTES # BLD AUTO: 2.76 K/UL (ref 1–4.8)
LYMPHOCYTES NFR BLD: 41.2 % (ref 22–41)
MAGNESIUM SERPL-MCNC: 1.9 MG/DL (ref 1.5–2.5)
MCH RBC QN AUTO: 28.7 PG (ref 27–33)
MCHC RBC AUTO-ENTMCNC: 33.1 G/DL (ref 33.7–35.3)
MCV RBC AUTO: 86.9 FL (ref 81.4–97.8)
METHADONE UR QL SCN: NEGATIVE
MONOCYTES # BLD AUTO: 0.41 K/UL (ref 0–0.85)
MONOCYTES NFR BLD AUTO: 6.1 % (ref 0–13.4)
NEUTROPHILS # BLD AUTO: 3.35 K/UL (ref 1.82–7.42)
NEUTROPHILS NFR BLD: 50.1 % (ref 44–72)
NRBC # BLD AUTO: 0 K/UL
NRBC BLD-RTO: 0 /100 WBC
OPIATES UR QL SCN: NEGATIVE
OXYCODONE UR QL SCN: NEGATIVE
PCP UR QL SCN: NEGATIVE
PHOSPHATE SERPL-MCNC: 4 MG/DL (ref 2.5–4.5)
PLATELET # BLD AUTO: 245 K/UL (ref 164–446)
PMV BLD AUTO: 9.9 FL (ref 9–12.9)
POTASSIUM SERPL-SCNC: 4 MMOL/L (ref 3.6–5.5)
PROPOXYPH UR QL SCN: NEGATIVE
PROT SERPL-MCNC: 6.1 G/DL (ref 6–8.2)
RBC # BLD AUTO: 5.43 M/UL (ref 4.7–6.1)
SODIUM SERPL-SCNC: 134 MMOL/L (ref 135–145)
TRIGL SERPL-MCNC: 253 MG/DL (ref 0–149)
TSH SERPL DL<=0.005 MIU/L-ACNC: 2.69 UIU/ML (ref 0.38–5.33)
WBC # BLD AUTO: 6.7 K/UL (ref 4.8–10.8)

## 2018-02-21 PROCEDURE — 99407 BEHAV CHNG SMOKING > 10 MIN: CPT

## 2018-02-21 PROCEDURE — 700102 HCHG RX REV CODE 250 W/ 637 OVERRIDE(OP): Performed by: INTERNAL MEDICINE

## 2018-02-21 PROCEDURE — A9270 NON-COVERED ITEM OR SERVICE: HCPCS | Performed by: INTERNAL MEDICINE

## 2018-02-21 PROCEDURE — 70551 MRI BRAIN STEM W/O DYE: CPT

## 2018-02-21 PROCEDURE — 80307 DRUG TEST PRSMV CHEM ANLYZR: CPT

## 2018-02-21 PROCEDURE — 80061 LIPID PANEL: CPT

## 2018-02-21 PROCEDURE — 93306 TTE W/DOPPLER COMPLETE: CPT

## 2018-02-21 PROCEDURE — G0378 HOSPITAL OBSERVATION PER HR: HCPCS

## 2018-02-21 PROCEDURE — 96372 THER/PROPH/DIAG INJ SC/IM: CPT

## 2018-02-21 PROCEDURE — 84443 ASSAY THYROID STIM HORMONE: CPT

## 2018-02-21 PROCEDURE — 97535 SELF CARE MNGMENT TRAINING: CPT

## 2018-02-21 PROCEDURE — 85025 COMPLETE CBC W/AUTO DIFF WBC: CPT

## 2018-02-21 PROCEDURE — 700111 HCHG RX REV CODE 636 W/ 250 OVERRIDE (IP): Performed by: INTERNAL MEDICINE

## 2018-02-21 PROCEDURE — 83735 ASSAY OF MAGNESIUM: CPT

## 2018-02-21 PROCEDURE — 83036 HEMOGLOBIN GLYCOSYLATED A1C: CPT

## 2018-02-21 PROCEDURE — 80053 COMPREHEN METABOLIC PANEL: CPT

## 2018-02-21 PROCEDURE — 93880 EXTRACRANIAL BILAT STUDY: CPT

## 2018-02-21 PROCEDURE — 72141 MRI NECK SPINE W/O DYE: CPT

## 2018-02-21 PROCEDURE — 84100 ASSAY OF PHOSPHORUS: CPT

## 2018-02-21 PROCEDURE — 99217 PR OBSERVATION CARE DISCHARGE: CPT | Performed by: HOSPITALIST

## 2018-02-21 PROCEDURE — 82962 GLUCOSE BLOOD TEST: CPT

## 2018-02-21 PROCEDURE — 70544 MR ANGIOGRAPHY HEAD W/O DYE: CPT

## 2018-02-21 RX ORDER — GABAPENTIN 100 MG/1
100 CAPSULE ORAL 3 TIMES DAILY
Qty: 90 CAP | Refills: 3 | Status: SHIPPED | OUTPATIENT
Start: 2018-02-21 | End: 2018-05-08

## 2018-02-21 RX ORDER — GABAPENTIN 100 MG/1
100 CAPSULE ORAL 3 TIMES DAILY
Status: DISCONTINUED | OUTPATIENT
Start: 2018-02-21 | End: 2018-02-21 | Stop reason: HOSPADM

## 2018-02-21 RX ORDER — CLOPIDOGREL BISULFATE 75 MG/1
75 TABLET ORAL DAILY
Qty: 30 TAB | Refills: 3 | Status: SHIPPED | OUTPATIENT
Start: 2018-02-22 | End: 2018-04-20

## 2018-02-21 RX ADMIN — ENOXAPARIN SODIUM 40 MG: 100 INJECTION SUBCUTANEOUS at 11:51

## 2018-02-21 RX ADMIN — CLOPIDOGREL 75 MG: 75 TABLET, FILM COATED ORAL at 09:58

## 2018-02-21 RX ADMIN — LISINOPRIL 40 MG: 20 TABLET ORAL at 09:57

## 2018-02-21 RX ADMIN — AMLODIPINE BESYLATE 10 MG: 5 TABLET ORAL at 09:57

## 2018-02-21 RX ADMIN — TIOTROPIUM BROMIDE 1 CAPSULE: 18 CAPSULE ORAL; RESPIRATORY (INHALATION) at 09:58

## 2018-02-21 RX ADMIN — CYCLOBENZAPRINE 10 MG: 10 TABLET, FILM COATED ORAL at 11:51

## 2018-02-21 RX ADMIN — FAMOTIDINE 20 MG: 20 TABLET, FILM COATED ORAL at 09:58

## 2018-02-21 RX ADMIN — BUDESONIDE AND FORMOTEROL FUMARATE DIHYDRATE 2 PUFF: 160; 4.5 AEROSOL RESPIRATORY (INHALATION) at 09:57

## 2018-02-21 ASSESSMENT — ACTIVITIES OF DAILY LIVING (ADL): TOILETING: INDEPENDENT

## 2018-02-21 ASSESSMENT — PAIN SCALES - GENERAL: PAINLEVEL_OUTOF10: 0

## 2018-02-21 NOTE — PROGRESS NOTES
Received pt from ED.  Pt is A/Ox4.  Respirations are even and unlabored on RA  Pt states left hand and left leg numbness, no weakness noted.  Swallow eval completed and diet ordered.  MRI screening sheet faxed.  POC reviewed, verbalized understanding.  Will continue to closely monitor. Call light within reach.

## 2018-02-21 NOTE — DISCHARGE INSTRUCTIONS
Discharge Instructions    Discharged to home by car with relative. Discharged via walking, hospital escort: Refused.  Special equipment needed: Not Applicable    Be sure to schedule a follow-up appointment with your primary care doctor or any specialists as instructed.     Discharge Plan:   Diet Plan: Discussed  Activity Level: Discussed  Smoking Cessation Offered: Patient Counseled  Confirmed Follow up Appointment: Patient to Call and Schedule Appointment  Confirmed Symptoms Management: Discussed  Medication Reconciliation Updated: Yes  Influenza Vaccine Indication: Not indicated: Previously immunized this influenza season and > 8 years of age    I understand that a diet low in cholesterol, fat, and sodium is recommended for good health. Unless I have been given specific instructions below for another diet, I accept this instruction as my diet prescription.   Other diet: heart healthy    Special Instructions: None    · Is patient discharged on Warfarin / Coumadin?   No                 Depression / Suicide Risk    As you are discharged from this [facility] facility, it is important to learn how to keep safe from harming yourself.    Recognize the warning signs:  · Abrupt changes in personality, positive or negative- including increase in energy   · Giving away possessions  · Change in eating patterns- significant weight changes-  positive or negative  · Change in sleeping patterns- unable to sleep or sleeping all the time   · Unwillingness or inability to communicate  · Depression  · Unusual sadness, discouragement and loneliness  · Talk of wanting to die  · Neglect of personal appearance   · Rebelliousness- reckless behavior  · Withdrawal from people/activities they love  · Confusion- inability to concentrate     If you or a loved one observes any of these behaviors or has concerns about self-harm, here's what you can do:  · Talk about it- your feelings and reasons for harming yourself  · Remove any means that  you might use to hurt yourself (examples: pills, rope, extension cords, firearm)  · Get professional help from the community (Mental Health, Substance Abuse, psychological counseling)  · Do not be alone:Call your Safe Contact- someone whom you trust who will be there for you.  · Call your local CRISIS HOTLINE 696-8370 or 586-705-3212  · Call your local Children's Mobile Crisis Response Team [place] (621) 605-6019 or www.Right Media  · Call the toll free National Suicide Prevention Hotlines   · National Suicide Prevention Lifeline 680-183-RKJV (5462)  · National Hope Line Network 800-SUICIDE (679-4820)

## 2018-02-21 NOTE — ASSESSMENT & PLAN NOTE
L sided not involving face  Differential include TIA, CVA vs Cervical myelopathy vs Neuropathy related to DM II  Admit for observation  TIA / CVA work up  Obtain MRI cervical spine  Consider gabapentin if above work up negative  Stroke protocol  No indication for permissive HTN  Intolerant to aspirin, start plavix  High intensity statin therapy  Risk factor modification

## 2018-02-21 NOTE — DISCHARGE SUMMARY
CHIEF COMPLAINT ON ADMISSION  Left sided numbness.    HPI & HOSPITAL COURSE  This is a 52 y.o. year old male with history of uncontrolled diabetes here with complaints of new onset left finger and toe numbness. The patient was worked up for acute CVA. MRI brain and MRA head were negative for any acute abnormalities. CT head was negative. MRI cervical spine was also negative for any acute abnormalities. The patient was found to have a hemoglobin A1c of 11.8 and has had a severely elevated hemoglobin A1c over the last year. His symptoms are likely secondary to neuropathy with uncontrolled diabetes. The patient currently follows with endocrinology as an outpatient for this and continue to follow with them. At this time the patient will is started on Neurontin and as he has had no further neurological deficits he will be discharged home at this time. He will continue his current outpatient diabetes medication and will follow closely with endocrinology.    DISCHARGE PROBLEM LIST  Active Hospital Problems    Diagnosis   • Numbness [R20.0]     Priority: High   • Hyponatremia [E87.1]     Priority: Low   • Type 2 diabetes mellitus with diabetic neuropathy, without long-term current use of insulin (CMS-HCC) [E11.40]     Priority: Low   • Tobacco abuse [Z72.0]     Priority: Low   • HLD (hyperlipidemia) [E78.5]     Priority: Low     ICD-10 transition     • HTN (hypertension) [I10]     Priority: Low   • COPD (chronic obstructive pulmonary disease) (CMS-HCC) [J44.9]     Priority: Low     mild         FOLLOW UP  Future Appointments  Date Time Provider Department Center   2/23/2018 2:15 PM LAYLA Singletary   2/28/2018 2:30 PM Eleuterio Lara M.D. [facility]       MEDICATIONS ON DISCHARGE   Wagner Kenney   Home Medication Instructions THELMA:[id number]    Printed on:02/21/18 1504   Medication Information                      albuterol 108 (90 Base) MCG/ACT Aero Soln inhalation aerosol  Inhale 2 Puffs by  mouth every 6 hours as needed for Shortness of Breath.             amlodipine (NORVASC) 10 MG Tab  Take 1 Tab by mouth every day.             atorvastatin (LIPITOR) 20 MG Tab  Take 1 Tab by mouth every day.             budesonide-formoterol (SYMBICORT) 160-4.5 MCG/ACT Aerosol  INHALE 2 PUFFS BY MOUTH 2 TIMES A DAY.             budesonide-formoterol (SYMBICORT) 160-4.5 MCG/ACT Aerosol  Inhale 2 Puffs by mouth 1 time daily as needed.             clopidogrel (PLAVIX) 75 MG Tab  Take 1 Tab by mouth every day.             cyclobenzaprine (FLEXERIL) 10 MG Tab  Take 10 mg by mouth every day.             gabapentin (NEURONTIN) 100 MG Cap  Take 1 Cap by mouth 3 times a day.             Insulin Degludec (TRESIBA FLEXTOUCH) 200 UNIT/ML Solution Pen-injector  Inject 50 Units as instructed every bedtime.             JARDIANCE 25 MG Tab  Take 1 tablet by mouth every day.             lisinopril (PRINIVIL, ZESTRIL) 40 MG tablet  Take 1 Tab by mouth every day.             nicotine (NICODERM) 14 MG/24HR PATCH 24 HR  Apply 1 Patch to skin as directed every 24 hours.             pioglitazone (ACTOS) 30 MG Tab  Take 1 Tab by mouth every day.             ranitidine (ZANTAC) 150 MG Tab  Take 1 Tab by mouth 2 times a day as needed for Heartburn.             SPIRIVA HANDIHALER 18 MCG Cap  INHALE 1 CAPSULE BY MOUTH 1 TIME DAILY AS NEEDED (SHORTNESS OF BREATH).             VICTOZA 18 MG/3ML Solution Pen-injector injection  Inject 0.3 mL as instructed every day.                 DIET  Orders Placed This Encounter   Procedures   • Diet Order     Standing Status:   Standing     Number of Occurrences:   1     Order Specific Question:   Diet:     Answer:   Consistent Carbohydrate [4]     Order Specific Question:   Diet:     Answer:   2 Gram Sodium [7]     Order Specific Question:   Diet:     Answer:   Diabetic [3]     Order Specific Question:   Diet:     Answer:   Cardiac [6]       ACTIVITY  As tolerated.      LABORATORY  Lab Results   Component  Value Date/Time    SODIUM 134 (L) 02/21/2018 04:42 AM    POTASSIUM 4.0 02/21/2018 04:42 AM    CHLORIDE 106 02/21/2018 04:42 AM    CO2 22 02/21/2018 04:42 AM    GLUCOSE 128 (H) 02/21/2018 04:42 AM    BUN 15 02/21/2018 04:42 AM    CREATININE 1.00 02/21/2018 04:42 AM    CREATININE 1.2 09/29/2008 09:54 AM        Lab Results   Component Value Date/Time    WBC 6.7 02/21/2018 04:42 AM    HEMOGLOBIN 15.6 02/21/2018 04:42 AM    HEMATOCRIT 47.2 02/21/2018 04:42 AM    PLATELETCT 245 02/21/2018 04:42 AM        Total time of the discharge process was approximately 36 minutes.

## 2018-02-21 NOTE — PROGRESS NOTES
Assumed care at 1900.  Report received from Mary LIRA. Assessment complete, plan of care discussed and understood.  Pt reports chronic neck pain 7/10, flexeril given. /104, medicated with PRN labetalol will recheck BP in one hour.  Pt educated on call light  verbalizes understanding.  Pt denies any additional needs at this time.  Call light and phone within reach. Will continue to monitor.

## 2018-02-21 NOTE — PROGRESS NOTES
Pt ambulated to restroom steady gait standby assist. VS stable BP under parameters. Neuro assessment completed with no changes noted.

## 2018-02-21 NOTE — H&P
Hospital Medicine History and Physical    Date of Service  2/20/2018    Chief Complaint  Chief Complaint   Patient presents with   • Numbness     L arm, L leg, L foot       History of Presenting Illness  52 y.o. male who presented 2/20/2018 with numbness involving the left side of the body. Patient has underlying history of diabetes mellitus type II, hypertension and hyperlipidemia. Denies any prior history of stroke or coronary artery disease. Patient reports that yesterday around 3 PM he started having numbness and tingling involving into his left upper extremity. This was most profound in his left hand/fingers. Today he noticed numbness and tingling in his entire left body. Subsequently he had most numbness and tingling in his left toes. He decided to come into the emergency room for further evaluation. Upon evaluation he now only has numbness and tingling in the left upper extremity and numbness and tingling in the rest of the body has nearly improved. Other he otherwise he has had a headache today. He denies any chest pain. No other complaints per patient. He denies any dysarthria, aphasia, facial droop or any motor symptoms. No other neurological symptoms reported by the patient. He is an active smoker. He smokes half pack a day. Of note he reported that he has had prior problems with degenerative disc disease involving his cervical spine and has previously received steroid/injections in his spine for this. His last injection was in August 2017. He has had intermittent neck pain.   Primary Care Physician  Eleuterio Lara M.D.    Consultants  None    Code Status  FULL CODE    Review of Systems  Review of Systems   Constitutional: Negative for chills, diaphoresis, fever and malaise/fatigue.   HENT: Negative for hearing loss and tinnitus.    Eyes: Negative for blurred vision and double vision.   Respiratory: Negative for cough, hemoptysis, shortness of breath and wheezing.    Cardiovascular: Negative for chest pain,  palpitations, orthopnea, claudication and leg swelling.   Gastrointestinal: Negative for abdominal pain, blood in stool, constipation, diarrhea, heartburn, melena, nausea and vomiting.   Genitourinary: Negative for dysuria, flank pain, frequency, hematuria and urgency.   Musculoskeletal: Positive for neck pain. Negative for back pain, falls, joint pain and myalgias.   Skin: Negative for itching and rash.   Neurological: Positive for tingling, sensory change and headaches. Negative for dizziness, tremors, speech change, focal weakness, seizures, loss of consciousness and weakness.   Psychiatric/Behavioral: Negative for depression, hallucinations, memory loss, substance abuse and suicidal ideas. The patient is not nervous/anxious and does not have insomnia.         Past Medical History  Past Medical History:   Diagnosis Date   • COPD (chronic obstructive pulmonary disease) (CMS-HCC) 3/31/10    mild   • Chronic LBP    • Chronic shoulder pain    • DM (diabetes mellitus) (CMS-HCC)     TYPE II,diet controlled   • HTN (hypertension)    • Kidney disease        Surgical History  Past Surgical History:   Procedure Laterality Date   • OTHER ORTHOPEDIC SURGERY  knee       Medications  No current facility-administered medications on file prior to encounter.      Current Outpatient Prescriptions on File Prior to Encounter   Medication Sig Dispense Refill   • VICTOZA 18 MG/3ML Solution Pen-injector injection Inject 0.3 mL as instructed every day. (Patient taking differently: Inject 1.8 mg as instructed every evening.) 3 PEN 6   • Insulin Degludec (TRESIBA FLEXTOUCH) 200 UNIT/ML Solution Pen-injector Inject 50 Units as instructed every bedtime. 3 PEN 2   • pioglitazone (ACTOS) 30 MG Tab Take 1 Tab by mouth every day. 30 Tab 11   • JARDIANCE 25 MG Tab Take 1 tablet by mouth every day. 30 Tab 3   • nicotine (NICODERM) 14 MG/24HR PATCH 24 HR Apply 1 Patch to skin as directed every 24 hours. 30 Patch 3   • albuterol 108 (90 Base)  "MCG/ACT Aero Soln inhalation aerosol Inhale 2 Puffs by mouth every 6 hours as needed for Shortness of Breath. 8.5 g 6   • budesonide-formoterol (SYMBICORT) 160-4.5 MCG/ACT Aerosol INHALE 2 PUFFS BY MOUTH 2 TIMES A DAY. 1 Inhaler 11   • SPIRIVA HANDIHALER 18 MCG Cap INHALE 1 CAPSULE BY MOUTH 1 TIME DAILY AS NEEDED (SHORTNESS OF BREATH). 3 Cap 2   • lisinopril (PRINIVIL, ZESTRIL) 40 MG tablet Take 1 Tab by mouth every day. 30 Tab 3   • atorvastatin (LIPITOR) 20 MG Tab Take 1 Tab by mouth every day. 30 Tab 3   • ranitidine (ZANTAC) 150 MG Tab Take 1 Tab by mouth 2 times a day as needed for Heartburn. 60 Tab 3   • amlodipine (NORVASC) 10 MG Tab Take 1 Tab by mouth every day. 90 Tab 1       Family History  Family History   Problem Relation Age of Onset   • Hypertension Mother    • Diabetes Father    • Hypertension Father    • Diabetes Sister        Social History  Social History   Substance Use Topics   • Smoking status: Current Every Day Smoker     Packs/day: 0.50     Years: 25.00     Types: Cigarettes     Last attempt to quit: 2012   • Smokeless tobacco: Never Used   • Alcohol use 0.0 - 0.6 oz/week       Allergies  Allergies   Allergen Reactions   • Apple Swelling     Per patient: swelling of mouth and jittery feeling.  Apple allergy to raw apples; apple juice and applesauce okay per patient   • Asa [Aspirin]      \"funny taste in my mouth. My stomach has an 'empty' feeling.\"  \"throat closing\"   • Metformin      Pt states he \"cramps up\"        Physical Exam  Laboratory   Hemodynamics  Temp (24hrs), Av.9 °C (98.5 °F), Min:36.9 °C (98.4 °F), Max:37.1 °C (98.7 °F)   Temperature: 36.9 °C (98.4 °F)  Pulse  Av  Min: 56  Max: 92 Heart Rate (Monitored): 92  Blood Pressure: 130/70, NIBP: 138/99      Respiratory      Respiration: 20, Pulse Oximetry: 97 %             Physical Exam   Constitutional: He is oriented to person, place, and time. He appears well-developed and well-nourished. No distress.   HENT:   Head: " Normocephalic.   Mouth/Throat: Oropharynx is clear and moist. No oropharyngeal exudate.   Eyes: Conjunctivae are normal. Pupils are equal, round, and reactive to light. No scleral icterus.   Neck: No JVD present.   Cardiovascular: Normal rate, regular rhythm and normal heart sounds.  Exam reveals no gallop and no friction rub.    No murmur heard.  Pulmonary/Chest: No stridor. No respiratory distress. He has no wheezes. He has no rales.   Abdominal: Soft. Bowel sounds are normal. He exhibits no distension. There is no tenderness. There is no rebound and no guarding.   Musculoskeletal: He exhibits no edema, tenderness or deformity.   Neurological: He is alert and oriented to person, place, and time. He has normal reflexes. No cranial nerve deficit.   Numbness . Parasthesia, > LUE.    Skin: Skin is warm and dry. He is not diaphoretic.   Psychiatric: He has a normal mood and affect. His behavior is normal. Judgment and thought content normal.       Recent Labs      02/20/18   1349   WBC  7.8   RBC  6.06   HEMOGLOBIN  17.2   HEMATOCRIT  53.7*   MCV  88.6   MCH  28.4   MCHC  32.0*   RDW  43.8   PLATELETCT  270   MPV  9.5     Recent Labs      02/20/18   1349   SODIUM  134*   POTASSIUM  4.0   CHLORIDE  102   CO2  26   GLUCOSE  89   BUN  11   CREATININE  1.18   CALCIUM  9.5     Recent Labs      02/20/18   1349   ALTSGPT  29   ASTSGOT  24   ALKPHOSPHAT  68   TBILIRUBIN  0.7   LIPASE  142*   GLUCOSE  89     Recent Labs      02/20/18   1349   APTT  24.8   INR  0.96     Recent Labs      02/20/18   1349   BNPBTYPENAT  55         Lab Results   Component Value Date    TROPONINI 0.02 02/20/2018     Urinalysis:    Lab Results  Component Value Date/Time   SPECGRAVITY 1.029 12/09/2016 1140   GLUCOSEUR >1000 (A) 12/09/2016 1140   KETONES Trace (A) 12/09/2016 1140   NITRITE Negative 12/09/2016 1140   WBCURINE 0-2 (A) 12/09/2016 1140   RBCURINE 2-5 (A) 12/09/2016 1140   BACTERIA Few (A) 10/25/2005 0647   EPITHELCELL Few 12/09/2016 1140         Imaging  Reviewed   Assessment/Plan     I anticipate this patient is appropriate for observation status at this time.    Numbness- (present on admission)   Assessment & Plan    L sided not involving face  Differential include TIA, CVA vs Cervical myelopathy vs Neuropathy related to DM II  Admit for observation  TIA / CVA work up  Obtain MRI cervical spine  Consider gabapentin if above work up negative  Stroke protocol  No indication for permissive HTN  Intolerant to aspirin, start plavix  High intensity statin therapy  Risk factor modification        Hyponatremia- (present on admission)   Assessment & Plan    Gentle IVF hydration  Recheck in am tomorrow        Type 2 diabetes mellitus with diabetic neuropathy, without long-term current use of insulin (CMS-HCC)- (present on admission)   Assessment & Plan    Uncontrolled with hyperglycemia  Check A1C  Holding oral hypoglycemic agents / at home regimen  Lantus 50 / SSI  Titrate to achieve normoglycemic control        Tobacco abuse- (present on admission)   Assessment & Plan    Counseled and educated regarding smoking cessation        HLD (hyperlipidemia)- (present on admission)   Assessment & Plan    Transitioned to high intensity statin therapy  Check lipid profile  Consider high intensity statin therapy on discharge        HTN (hypertension)- (present on admission)   Assessment & Plan    Continue at home regimen of amlodipine / lisinopril  Aim SBP < 120 with multiple risk factors  Titrate as clinically appropriate        COPD (chronic obstructive pulmonary disease) (CMS-HCC)- (present on admission)   Assessment & Plan    Without acute exacerbation  Continue at home regimen            VTE prophylaxis: SCD and SC Lovenox.

## 2018-02-21 NOTE — THERAPY
52 y.o. male with h/o DMII, COPD, DDD (steroid injections to c-spine), who presented with L hand and L foot numbness/tingling. Work up for CVA/TIA negative. Per pt, suspicion is diabetic neuropathy. Pt seen for brief consult/education. Pt admits DM has been poorly controlled. Pt reports continued numbness and tingling L hand and foot; demos intact strength and coordination. Denies difficulty with ambulation or ADL (RN corroborates). Educated pt on risks of uncontrolled DM and importance of maintaining sugars. Pt verbalizes understanding. No further acute OT needs at this time.

## 2018-02-21 NOTE — PROGRESS NOTES
IV Dc 'd. Discharge instructions provided to patient. Pt verbalizes understanding. Pt states all questions have been answered. Copy of DC provided to patient. Signed copy in chart. 2 prescriptions sent to pharmacy. Pt states all personal belongings are in possession.  Pt escorted off unit by this RN without incident.

## 2018-02-21 NOTE — ASSESSMENT & PLAN NOTE
Transitioned to high intensity statin therapy  Check lipid profile  Consider high intensity statin therapy on discharge

## 2018-02-21 NOTE — DISCHARGE PLANNING
Care Transition Team Assessment    Information Source  Orientation : Oriented x 4  Information Given By: Patient  Who is responsible for making decisions for patient? : Patient    Readmission Evaluation  Is this a readmission?: No         Interdisciplinary Discharge Planning  Does Admitting Nurse Feel This Could be a Complex Discharge?: No  Primary Care Physician: trae  Lives with - Patient's Self Care Capacity: Alone and Able to Care For Self  Support Systems: Family Member(s)  Housing / Facility: [address]  Do You Take your Prescribed Medications Regularly: Yes  Able to Return to Previous ADL's: Yes  Mobility Issues: No  Prior Services: None    Discharge Preparedness  What is your plan after discharge?: Uncertain - pending medical team collaboration  What are your discharge supports?: Spouse  Difficulity with ADLs: None  Difficulity with IADLs: None         Finances  Financial Barriers to Discharge: No  Prescription Coverage: Yes    Vision / Hearing Impairment  Vision Impairment : No  Hearing Impairment : No    Values / Beliefs / Concerns  Values / Beliefs Concerns : No    Advance Directive  Advance Directive?: None  Advance Directive offered?: AD Booklet given         Psychological Assessment  History of Substance Abuse: None         Anticipated Discharge Information  Anticipated discharge disposition: Home  Discharge Address: face sheet

## 2018-02-21 NOTE — PROGRESS NOTES
Patient appears to be sleeping, rise and fall of chest noted. Easy to arouse, call light and belongings within reach.

## 2018-02-21 NOTE — ASSESSMENT & PLAN NOTE
Uncontrolled with hyperglycemia  Check A1C  Holding oral hypoglycemic agents / at home regimen  Lantus 50 / SSI  Titrate to achieve normoglycemic control

## 2018-02-21 NOTE — PROGRESS NOTES
Assessment completed. Pt A&Ox4. Respirations are even and unlabored on RA. Pt denies pain at this time. Numbness and tingling reported to L hand fingers. All other neuro intact. Monitors applied, VS stable, call light and belongings within reach. POC updated. Pt educated on room and call light, pt verbalized understanding. Communication board updated. Needs met. Meal provided.

## 2018-02-21 NOTE — PROGRESS NOTES
Pt BP WNL, IV fluids running. FSBS 117 no need for Humulin this AM. SR on heart monitor. Declined nicotine patch.

## 2018-02-21 NOTE — ASSESSMENT & PLAN NOTE
Continue at home regimen of amlodipine / lisinopril  Aim SBP < 120 with multiple risk factors  Titrate as clinically appropriate

## 2018-02-21 NOTE — ED NOTES
The Medication Reconciliation process has been completed by interviewing the patient    Allergies have been reviewed  Antibiotic use in 30 days - none    Home Pharmacy:  [facility]

## 2018-02-21 NOTE — RESPIRATORY CARE
"  COPD EDUCATION by COPD CLINICAL EDUCATOR  (Phone: [phone])  2/21/2018 at 10:11 AM by eBcky Carr     Patient was interviewed by Respiratory Education team for COPD program. Patient refused COPD program. Information packet about lung disease, its treatments and \"Stop Smoking\" information given to patient.    "

## 2018-02-23 ENCOUNTER — OFFICE VISIT (OUTPATIENT)
Dept: ENDOCRINOLOGY | Facility: MEDICAL CENTER | Age: 53
End: 2018-02-23
Payer: MEDICAID

## 2018-02-23 VITALS
DIASTOLIC BLOOD PRESSURE: 78 MMHG | HEART RATE: 115 BPM | HEIGHT: 77 IN | BODY MASS INDEX: 27.75 KG/M2 | SYSTOLIC BLOOD PRESSURE: 130 MMHG | OXYGEN SATURATION: 100 % | WEIGHT: 235 LBS

## 2018-02-23 DIAGNOSIS — E11.69 HYPERLIPIDEMIA ASSOCIATED WITH TYPE 2 DIABETES MELLITUS (HCC): ICD-10-CM

## 2018-02-23 DIAGNOSIS — E11.65 TYPE 2 DIABETES MELLITUS WITH HYPERGLYCEMIA, WITHOUT LONG-TERM CURRENT USE OF INSULIN (HCC): ICD-10-CM

## 2018-02-23 DIAGNOSIS — I10 ESSENTIAL HYPERTENSION: ICD-10-CM

## 2018-02-23 DIAGNOSIS — E78.5 HYPERLIPIDEMIA ASSOCIATED WITH TYPE 2 DIABETES MELLITUS (HCC): ICD-10-CM

## 2018-02-23 PROCEDURE — 99214 OFFICE O/P EST MOD 30 MIN: CPT | Performed by: PHYSICIAN ASSISTANT

## 2018-02-23 RX ORDER — LIRAGLUTIDE 6 MG/ML
1.8 INJECTION SUBCUTANEOUS DAILY
Qty: 3 PEN | Refills: 6 | Status: SHIPPED | OUTPATIENT
Start: 2018-02-23

## 2018-02-23 RX ORDER — EMPAGLIFLOZIN 25 MG/1
1 TABLET, FILM COATED ORAL DAILY
Qty: 30 TAB | Refills: 3 | Status: SHIPPED | OUTPATIENT
Start: 2018-02-23 | End: 2018-08-29 | Stop reason: SDUPTHER

## 2018-02-23 RX ORDER — PIOGLITAZONEHYDROCHLORIDE 30 MG/1
30 TABLET ORAL DAILY
Qty: 30 TAB | Refills: 11 | Status: SHIPPED | OUTPATIENT
Start: 2018-02-23

## 2018-02-23 NOTE — PROGRESS NOTES
Return to office Patient Consult Note  Referred by: Eleuterio Lara M.D.    Reason for consult: Diabetes Management Type 2    HPI:  Wagner Kenney is a 52 y.o. old patient who is seeing us today for diabetes care.  This is a pleasant patient with diabetes and I appreciate the opportunity to participate in the care of this patient.    BG Diary:2/23/2018  In the AM: 108, 149, 184, 137, 103, 85, 131, 139  Before Bed: 165, 149, 235, 205, 170, 133, 97, 139    BG Diary:2/15/2018  In the AM: 128, 95, 173, 238, 194, 120, 156, 158, 149  Before Bed: 202, 180, 336, 200, 264, 165, 149, 235      BG Diary:2/9/2018  In the AM: did not bring     c-peptide is 1.6  Labs of 12/26/17 HbA1c is 13.3, GFR>60,    HbA1c of  7/20/17 is 12.7   HbA1c of 11/7/16 is 13.2  HbA1c of 12/2/15 was 12.9  HbA1c of 2/21/14 was 10.5     BG Diary:2/2/2018  In the AM: did not bring     No eye issues and seeing them in April  Feet numbness and burning     Weight on 2/2/18 he was 230 pounds and today he is 231      1. Type 2 diabetes mellitus with hyperglycemia, without long-term current use of insulin (CMS-HCC)  This is a new patient with me 2/2/18     He is on:  1.  Januvia  2.  Novolog  (once a day before breakfast 40units)  3.  Lantus  (has not been using)        2. Encounter for long-term (current) use of insulin (CMS-HCC)     STOP  1.  Januvia  2.  Novolog  (once a day before breakfast 40units)  3.  Lantus  (has not been using)     NOW ON:  1.  Tresiba 42 units at night before bed  2.   Victoza 1.8 at night before bed  3.   Jardiance 25mg once a day  4.   Actos 30mg one a day     3. Essential hypertension  This is stable    ROS:   Constitutional: No night sweats.  Eyes:  No visual changes.  Cardiac: No chest pain, No palpitations or racing heart rate.  Resp: No shortness of breath, No cough,   Gi: No Diarrhea    All other systems were reviewed and were/are negative.  The ROS was revised/revisited during this office visit from the patients first  office visit with me on 2/2/18 Please review the full ROS during the first office visit.    Past Medical History:  Patient Active Problem List    Diagnosis Date Noted   • Numbness 02/20/2018     Priority: High   • COPD with acute exacerbation (CMS-HCC) 02/13/2016     Priority: High   • Type 2 diabetes mellitus with hyperglycemia (CMS-HCC) 02/13/2016     Priority: Medium   • Type 2 diabetes mellitus with neurologic complication (CMS-HCC) 02/13/2016     Priority: Medium   • Diabetes 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, with long-term current use of insulin (CMS-HCC) 07/23/2015     Priority: Medium   • Chronic kidney disease (CKD), stage III (moderate) 07/26/2012     Priority: Medium   • HTN (hypertension) 07/15/2009     Priority: Medium   • Hyponatremia 02/20/2018     Priority: Low   • Type 2 diabetes mellitus with diabetic neuropathy, without long-term current use of insulin (CMS-HCC) 06/12/2017     Priority: Low   • Tobacco abuse 07/17/2015     Priority: Low   • HTN (hypertension) 01/13/2012     Priority: Low   • HLD (hyperlipidemia) 01/13/2012     Priority: Low   • COPD (chronic obstructive pulmonary disease) (CMS-HCC) 03/31/2010     Priority: Low   • Osteoarthritis of spine with radiculopathy, cervical region 09/09/2016   • Hyperlipidemia associated with type 2 diabetes mellitus (CMS-HCC) 10/06/2015   • Vitamin d deficiency 07/30/2013   • Renal cyst 07/26/2012   • Right-sided heart failure 05/17/2010   • ANABELA (obstructive sleep apnea) 04/14/2010   • GERD (gastroesophageal reflux disease) 07/15/2009   • Chronic low back pain 07/15/2009       Past Surgical History:  Past Surgical History:   Procedure Laterality Date   • OTHER ORTHOPEDIC SURGERY  knee       Allergies:  Apple; Asa [aspirin]; and Metformin    Social History:  Social History     Social History   • Marital status: Single     Spouse name: N/A   • Number of children: N/A   • Years of education: N/A     Occupational History   • Not  on file.     Social History Main Topics   • Smoking status: Current Every Day Smoker     Packs/day: 0.50     Years: 25.00     Types: Cigarettes     Last attempt to quit: 5/26/2012   • Smokeless tobacco: Never Used   • Alcohol use 0.0 - 0.6 oz/week   • Drug use: No   • Sexual activity: Not on file     Other Topics Concern   •  Service No   • Blood Transfusions No   • Caffeine Concern No   • Occupational Exposure No   • Hobby Hazards No   • Sleep Concern No     hard to sleep   • Stress Concern No   • Weight Concern No   • Special Diet Yes     diabetic    • Back Care Yes   • Exercise Yes     little bit   • Bike Helmet No     does not ride bike    • Seat Belt Yes   • Self-Exams No     Social History Narrative   • No narrative on file       Family History:  Family History   Problem Relation Age of Onset   • Hypertension Mother    • Diabetes Father    • Hypertension Father    • Diabetes Sister        Medications:    Current Outpatient Prescriptions:   •  Insulin Degludec (TRESIBA FLEXTOUCH) 200 UNIT/ML Solution Pen-injector, Inject 50 Units as instructed every bedtime., Disp: 3 PEN, Rfl: 2  •  JARDIANCE 25 MG Tab, Take 1 tablet by mouth every day., Disp: 30 Tab, Rfl: 3  •  VICTOZA 18 MG/3ML Solution Pen-injector injection, Inject 0.3 mL as instructed every day., Disp: 3 PEN, Rfl: 6  •  pioglitazone (ACTOS) 30 MG Tab, Take 1 Tab by mouth every day., Disp: 30 Tab, Rfl: 11  •  Insulin Pen Needle (NOVOFINE PLUS) 32G X 4 MM Misc, 1 Applicator by Does not apply route every day. Using one needle tip with victoza per day and tresiba, Disp: 100 Each, Rfl: 11  •  gabapentin (NEURONTIN) 100 MG Cap, Take 1 Cap by mouth 3 times a day., Disp: 90 Cap, Rfl: 3  •  clopidogrel (PLAVIX) 75 MG Tab, Take 1 Tab by mouth every day., Disp: 30 Tab, Rfl: 3  •  budesonide-formoterol (SYMBICORT) 160-4.5 MCG/ACT Aerosol, Inhale 2 Puffs by mouth 1 time daily as needed., Disp: , Rfl:   •  cyclobenzaprine (FLEXERIL) 10 MG Tab, Take 10 mg by  "mouth every day., Disp: , Rfl:   •  nicotine (NICODERM) 14 MG/24HR PATCH 24 HR, Apply 1 Patch to skin as directed every 24 hours., Disp: 30 Patch, Rfl: 3  •  albuterol 108 (90 Base) MCG/ACT Aero Soln inhalation aerosol, Inhale 2 Puffs by mouth every 6 hours as needed for Shortness of Breath., Disp: 8.5 g, Rfl: 6  •  budesonide-formoterol (SYMBICORT) 160-4.5 MCG/ACT Aerosol, INHALE 2 PUFFS BY MOUTH 2 TIMES A DAY., Disp: 1 Inhaler, Rfl: 11  •  SPIRIVA HANDIHALER 18 MCG Cap, INHALE 1 CAPSULE BY MOUTH 1 TIME DAILY AS NEEDED (SHORTNESS OF BREATH)., Disp: 3 Cap, Rfl: 2  •  lisinopril (PRINIVIL, ZESTRIL) 40 MG tablet, Take 1 Tab by mouth every day., Disp: 30 Tab, Rfl: 3  •  atorvastatin (LIPITOR) 20 MG Tab, Take 1 Tab by mouth every day., Disp: 30 Tab, Rfl: 3  •  ranitidine (ZANTAC) 150 MG Tab, Take 1 Tab by mouth 2 times a day as needed for Heartburn., Disp: 60 Tab, Rfl: 3  •  amlodipine (NORVASC) 10 MG Tab, Take 1 Tab by mouth every day., Disp: 90 Tab, Rfl: 1        Physical Examination:   Vital signs: /78   Pulse (!) 115   Ht 1.956 m (6' 5.01\")   Wt 106.6 kg (235 lb)   SpO2 100%   BMI 27.86 kg/m²   General: No distress, cooperative, well dressed and well nourished.   Eyes: No scleral icterus or discharge, No hyposphagma  ENMT: Normal on external inspection of nose, lips, No nasal drainage   Neck: No abnormal masses on inspection  Resp: Normal effort, Bilateral clear to auscultation, No wheezing, No rales  CVS: Regular rate and rhythm, S1 S2 normal, No murmur. No gallop  Extremities: No edema bilateral extremities  Neuro: Alert and oriented  Skin: No rash, No Ulcers  Psych: Normal mood and affect      Assessment and Plan:    1. Type 2 diabetes mellitus with hyperglycemia, without long-term current use of insulin (CMS-[facility])    NOW ON:  1.  Tresiba 42 units at night before bed  2.   Victoza 1.8 at night before bed  3.   Jardiance 25mg once a day  4.   Actos 30mg one a day    2. Hyperlipidemia associated with type " 2 diabetes mellitus (CMS-HCC)  He is doing really well at this point and I am happy for him for eating better and his health will only improve     3. Essential hypertension  He is doing really well at this point.     Return in about 3 months (around 5/23/2018).    Blood glucose log: Check BG in the morning when wake up, before lunch or dinner and before bed.  So three times a day.  Always bring BG diary to the next office visit.     Thank you kindly for allowing me to participate in the diabetes care plan for this patient.    Anam Mars PA-C, BC-ADM  Board Certified - [employer] [facility]  02/23/18    CC:   Eleuterio Lara M.D.

## 2018-02-28 ENCOUNTER — OFFICE VISIT (OUTPATIENT)
Dept: MEDICAL GROUP | Facility: MEDICAL CENTER | Age: 53
End: 2018-02-28
Attending: FAMILY MEDICINE
Payer: MEDICAID

## 2018-02-28 VITALS
SYSTOLIC BLOOD PRESSURE: 130 MMHG | TEMPERATURE: 99.1 F | WEIGHT: 231 LBS | BODY MASS INDEX: 27.28 KG/M2 | RESPIRATION RATE: 16 BRPM | HEART RATE: 76 BPM | DIASTOLIC BLOOD PRESSURE: 90 MMHG | OXYGEN SATURATION: 98 % | HEIGHT: 77 IN

## 2018-02-28 DIAGNOSIS — R20.0 NUMBNESS: ICD-10-CM

## 2018-02-28 DIAGNOSIS — K21.9 GASTROESOPHAGEAL REFLUX DISEASE, ESOPHAGITIS PRESENCE NOT SPECIFIED: ICD-10-CM

## 2018-02-28 PROCEDURE — 99213 OFFICE O/P EST LOW 20 MIN: CPT | Performed by: FAMILY MEDICINE

## 2018-02-28 RX ORDER — OMEPRAZOLE 20 MG/1
20 TABLET, DELAYED RELEASE ORAL DAILY
Qty: 30 TAB | Refills: 3 | Status: SHIPPED | OUTPATIENT
Start: 2018-02-28 | End: 2018-03-05

## 2018-02-28 ASSESSMENT — ENCOUNTER SYMPTOMS
CHILLS: 0
NAUSEA: 0
BACK PAIN: 1
SPUTUM PRODUCTION: 0
SPEECH CHANGE: 0
PALPITATIONS: 0
SENSORY CHANGE: 1
VOMITING: 0
ABDOMINAL PAIN: 0
FEVER: 0
TINGLING: 1
SHORTNESS OF BREATH: 0
COUGH: 0
TREMORS: 0
HEARTBURN: 1

## 2018-02-28 NOTE — PROGRESS NOTES
Subjective:      Wagner Kenney is a 52 y.o. male who presents with Follow-Up (tia)            Patient here for follow-up of her recent hospital visit for numbness in his left upper extremity. He had several diagnostic exams done the results are as follows:    1.  MRI of the brain without contrast within normal limits.  2.  In view of history of numbness, there is no evidence of abnormal periventricular and juxtacortical T2 hyperintensities to suggest any demyelinating plaques.    There is no aneurysm, stenosis or vascular malformation.     Mild bilateral internal carotid artery stenosis (<50%).    Is still not clear as to the origin of the numbness in his left upper extremity, but the numbness in his left upper extremity is still persistent but much better than it had been on the day of the hospitalization. We'll have him continue to use his medication as previously directed. We'll continue to follow.    He has been having persistent gastroesophageal reflux despite using ranitidine at 150 mg twice a day. We will try to make a switch to Prilosec, so Prilosec 20 mg will be sent to his pharmacy. Patient is also advised to avoid spicy and acidic foods as well as using Tums as needed. We'll continue to follow.     Current medications, allergies, and problem list reviewed with patient and updated in EPIC.          Review of Systems   Constitutional: Negative for chills and fever.   HENT: Negative for hearing loss and tinnitus.    Respiratory: Negative for cough, sputum production and shortness of breath.    Cardiovascular: Negative for chest pain and palpitations.   Gastrointestinal: Positive for heartburn. Negative for abdominal pain, nausea and vomiting.   Musculoskeletal: Positive for back pain and joint pain.   Skin: Negative for rash.   Neurological: Positive for tingling and sensory change. Negative for tremors and speech change.          Objective:     /90   Pulse 76   Temp 37.3 °C (99.1 °F)   Resp  "16   Ht 1.956 m (6' 5\")   Wt 104.8 kg (231 lb)   SpO2 98%   BMI 27.39 kg/m²      Physical Exam   Constitutional: He is oriented to person, place, and time. He appears well-developed and well-nourished.   HENT:   Head: Normocephalic and atraumatic.   Neck: Normal range of motion. Neck supple. No thyromegaly present.   Cardiovascular: Normal rate, regular rhythm and normal heart sounds.  Exam reveals no friction rub.    No murmur heard.  Pulmonary/Chest: Effort normal and breath sounds normal. No respiratory distress. He has no wheezes. He has no rales.   Abdominal: Soft. Bowel sounds are normal. He exhibits no distension. There is no tenderness.   Neurological: He is alert and oriented to person, place, and time. He displays normal reflexes. No cranial nerve deficit or sensory deficit. He exhibits normal muscle tone. Coordination normal.   Skin: Skin is warm and dry.   Psychiatric: He has a normal mood and affect. His behavior is normal.   Nursing note and vitals reviewed.              Assessment/Plan:     1. Gastroesophageal reflux disease, esophagitis presence not specified  Will have him start Prilosec 20 mg instead of his ranitidine at 150 mg twice a day. We'll continue to follow.  - omeprazole (PRILOSEC OTC) 20 MG tablet; Take 1 Tab by mouth every day.  Dispense: 30 Tab; Refill: 3    2. Numbness  The numbness of his left upper extremity has improved. Reviewed the results of his hospital exams. We'll continue to follow. ER precautions given to patient if he should have any sudden worsening or new symptoms associated with his numbness in his left upper extremity.        "

## 2018-03-01 ENCOUNTER — TELEPHONE (OUTPATIENT)
Dept: MEDICAL GROUP | Facility: MEDICAL CENTER | Age: 53
End: 2018-03-01

## 2018-03-01 NOTE — TELEPHONE ENCOUNTER
MEDICATION PRIOR AUTHORIZATION NEEDED:    1. Name of Medication: omeprazole    2. Requested By (Name of Pharmacy): [facility] care center     3. Is insurance on file current? yes    4. What is the name & phone number of the 3rd party payor? medicaid

## 2018-03-05 ENCOUNTER — TELEPHONE (OUTPATIENT)
Dept: MEDICAL GROUP | Facility: MEDICAL CENTER | Age: 53
End: 2018-03-05

## 2018-03-05 NOTE — TELEPHONE ENCOUNTER
MEDICATION PRIOR AUTHORIZATION NEEDED:    1. Name of Medication: nexium    2. Requested By (Name of Pharmacy): health care center     3. Is insurance on file current? yes    4. What is the name & phone number of the 3rd party payor? medicaid

## 2018-04-04 RX ORDER — RANITIDINE 150 MG/1
TABLET ORAL
Qty: 60 TAB | Refills: 2 | Status: SHIPPED | OUTPATIENT
Start: 2018-04-04 | End: 2018-07-12 | Stop reason: SDUPTHER

## 2018-04-04 RX ORDER — CYCLOBENZAPRINE HCL 10 MG
TABLET ORAL
Qty: 60 TAB | Refills: 2 | Status: SHIPPED | OUTPATIENT
Start: 2018-04-04 | End: 2018-05-08

## 2018-04-10 ENCOUNTER — OFFICE VISIT (OUTPATIENT)
Dept: MEDICAL GROUP | Facility: MEDICAL CENTER | Age: 53
End: 2018-04-10
Attending: FAMILY MEDICINE
Payer: MEDICAID

## 2018-04-10 VITALS
HEART RATE: 80 BPM | SYSTOLIC BLOOD PRESSURE: 135 MMHG | TEMPERATURE: 98 F | HEIGHT: 77 IN | WEIGHT: 230 LBS | DIASTOLIC BLOOD PRESSURE: 90 MMHG | OXYGEN SATURATION: 97 % | BODY MASS INDEX: 27.16 KG/M2 | RESPIRATION RATE: 14 BRPM

## 2018-04-10 DIAGNOSIS — R21 RASH AND NONSPECIFIC SKIN ERUPTION: ICD-10-CM

## 2018-04-10 PROCEDURE — 99213 OFFICE O/P EST LOW 20 MIN: CPT | Performed by: FAMILY MEDICINE

## 2018-04-10 PROCEDURE — 99214 OFFICE O/P EST MOD 30 MIN: CPT | Performed by: FAMILY MEDICINE

## 2018-04-10 RX ORDER — AMMONIUM LACTATE 12 G/100G
1 LOTION TOPICAL PRN
Qty: 1 BOTTLE | Refills: 3 | Status: SHIPPED | OUTPATIENT
Start: 2018-04-10 | End: 2018-04-25 | Stop reason: SDUPTHER

## 2018-04-10 RX ORDER — HYDROXYZINE HYDROCHLORIDE 25 MG/1
25 TABLET, FILM COATED ORAL 3 TIMES DAILY PRN
Qty: 30 TAB | Refills: 0 | Status: SHIPPED | OUTPATIENT
Start: 2018-04-10 | End: 2018-05-24 | Stop reason: SDUPTHER

## 2018-04-10 ASSESSMENT — ENCOUNTER SYMPTOMS
FATIGUE: 0
RHINORRHEA: 0
DIARRHEA: 0
BACK PAIN: 1
ANOREXIA: 0
COUGH: 0
TINGLING: 1
SPUTUM PRODUCTION: 0
NAIL CHANGES: 0
SORE THROAT: 0
VOMITING: 0
SHORTNESS OF BREATH: 0
PALPITATIONS: 0
ABDOMINAL PAIN: 0
FEVER: 0
CHILLS: 0
EYE PAIN: 0

## 2018-04-10 NOTE — PROGRESS NOTES
"Subjective:      Wagner Kenney is a 53 y.o. male who presents with Follow-Up and Rash (neck x 2 days)            Rash   This is a new problem. The current episode started in the past 7 days. The problem is unchanged. The affected locations include the neck. The rash is characterized by itchiness, dryness and redness. He was exposed to nothing. Pertinent negatives include no anorexia, congestion, cough, diarrhea, eye pain, facial edema, fatigue, fever, joint pain, nail changes, rhinorrhea, shortness of breath, sore throat or vomiting. Past treatments include nothing (will have pt start a moisturizer and an antihistamine. will continue to follow).       Review of Systems   Constitutional: Negative for chills, fatigue and fever.   HENT: Negative for congestion, hearing loss, rhinorrhea, sore throat and tinnitus.    Eyes: Negative for pain.   Respiratory: Negative for cough, sputum production and shortness of breath.    Cardiovascular: Negative for chest pain and palpitations.   Gastrointestinal: Negative for abdominal pain, anorexia, diarrhea and vomiting.   Musculoskeletal: Positive for back pain. Negative for joint pain.   Skin: Positive for itching and rash. Negative for nail changes.   Neurological: Positive for tingling.          Objective:     /90   Pulse 80   Temp 36.7 °C (98 °F)   Resp 14   Ht 1.956 m (6' 5\")   Wt 104.3 kg (230 lb)   SpO2 97%   BMI 27.27 kg/m²      Physical Exam   Constitutional: He is oriented to person, place, and time. He appears well-developed and well-nourished.   HENT:   Head: Normocephalic and atraumatic.   Cardiovascular: Normal rate, regular rhythm and normal heart sounds.  Exam reveals no friction rub.    No murmur heard.  Pulmonary/Chest: Effort normal and breath sounds normal. No respiratory distress. He has no wheezes. He has no rales.   Abdominal: Soft. Bowel sounds are normal. He exhibits no distension. There is no tenderness.   Neurological: He is alert and " oriented to person, place, and time.   Skin: Skin is warm and dry.   B neck rash   Psychiatric: He has a normal mood and affect. His behavior is normal.   Nursing note and vitals reviewed.              Assessment/Plan:     1. Rash and nonspecific skin eruption  Will have him use as moisturizer and antihistamine for his itchy rash. Will continue to follow.

## 2018-04-19 ENCOUNTER — OFFICE VISIT (OUTPATIENT)
Dept: CARDIOLOGY | Facility: MEDICAL CENTER | Age: 53
End: 2018-04-19
Payer: MEDICAID

## 2018-04-19 VITALS
OXYGEN SATURATION: 96 % | BODY MASS INDEX: 27.51 KG/M2 | WEIGHT: 233 LBS | DIASTOLIC BLOOD PRESSURE: 90 MMHG | HEART RATE: 90 BPM | HEIGHT: 77 IN | SYSTOLIC BLOOD PRESSURE: 146 MMHG

## 2018-04-19 DIAGNOSIS — E78.5 HYPERLIPIDEMIA, UNSPECIFIED HYPERLIPIDEMIA TYPE: ICD-10-CM

## 2018-04-19 DIAGNOSIS — R94.31 NONSPECIFIC ABNORMAL ELECTROCARDIOGRAM (ECG) (EKG): ICD-10-CM

## 2018-04-19 DIAGNOSIS — I10 ESSENTIAL HYPERTENSION: ICD-10-CM

## 2018-04-19 DIAGNOSIS — G45.8 OTHER SPECIFIED TRANSIENT CEREBRAL ISCHEMIAS: ICD-10-CM

## 2018-04-19 DIAGNOSIS — E11.40 TYPE 2 DIABETES MELLITUS WITH DIABETIC NEUROPATHY, WITHOUT LONG-TERM CURRENT USE OF INSULIN (HCC): ICD-10-CM

## 2018-04-19 PROCEDURE — 99203 OFFICE O/P NEW LOW 30 MIN: CPT | Performed by: INTERNAL MEDICINE

## 2018-04-19 NOTE — PROGRESS NOTES
Chief Complaint   Patient presents with   • Hyperlipidemia       Subjective:   Wagner Kenney is a 53 y.o. male who presents today for evaluation of an abnormal EKG. The patient was referred by Dr. Lara after he suffered what sounds like a TIA with numbness in the left hand and left leg. Evaluation in the hospital failed to reveal a specific cause even after MRI and carotid ultrasounds and echocardiogram. Patient was noted to have an EKG with Q waves in the inferior leads. He has had no history of chest pain or anginal like symptoms. He walks quite a bit and can climb several flights of stairs without having any issues to his knowledge. He does not have orthopnea, PND, or pedal edema. He does not have exertional dyspnea or other anginal type symptoms. He has had no recurrence of the TIA. He is allergic to aspirin. He also has risk factors of smoking, hypertension, hyperlipidemia, and diabetes.    Past Medical History:   Diagnosis Date   • Chronic LBP    • Chronic shoulder pain    • COPD (chronic obstructive pulmonary disease) (CMS-HCC) 3/31/10    mild   • DM (diabetes mellitus) (CMS-HCC)     TYPE II,diet controlled   • HTN (hypertension)    • Kidney disease      Past Surgical History:   Procedure Laterality Date   • OTHER ORTHOPEDIC SURGERY  knee     Family History   Problem Relation Age of Onset   • Hypertension Mother    • Diabetes Father    • Hypertension Father    • Diabetes Sister      Social History     Social History   • Marital status: Single     Spouse name: N/A   • Number of children: N/A   • Years of education: N/A     Occupational History   • Not on file.     Social History Main Topics   • Smoking status: Current Every Day Smoker     Packs/day: 0.50     Years: 25.00     Types: Cigarettes   • Smokeless tobacco: Never Used   • Alcohol use 0.0 - 0.6 oz/week   • Drug use: No   • Sexual activity: Not on file     Other Topics Concern   •  Service No   • Blood Transfusions No   • Caffeine Concern  "No   • Occupational Exposure No   • Hobby Hazards No   • Sleep Concern No     hard to sleep   • Stress Concern No   • Weight Concern No   • Special Diet Yes     diabetic    • Back Care Yes   • Exercise Yes     little bit   • Bike Helmet No     does not ride bike    • Seat Belt Yes   • Self-Exams No     Social History Narrative   • No narrative on file   He is single but has a significant other. He has worked as a  but is disabled because of bilateral rotator cuff dysfunction. He smokes a half a pack years of cigarettes a day but does not drink alcohol. He does do some resistive training  Allergies   Allergen Reactions   • Apple Swelling     Per patient: swelling of mouth and jittery feeling.  Apple allergy to raw apples; apple juice and applesauce okay per patient   • Asa [Aspirin]      \"funny taste in my mouth. My stomach has an 'empty' feeling.\"  \"throat closing\"   • Metformin      Pt states he \"cramps up\"     Outpatient Encounter Prescriptions as of 4/19/2018   Medication Sig Dispense Refill   • ammonium lactate (LAC-HYDRIN) 12 % Lotion Apply 1 Application to affected area(s) as needed. 1 Bottle 3   • raNITidine (ZANTAC) 150 MG Tab TAKE 1 TABLET ORALLY 2 TIMES DAILY IF NEEDED FOR HEARTBURN 60 Tab 2   • cyclobenzaprine (FLEXERIL) 10 MG Tab TAKE 1/2 TO 1 TABLET ORALLY 2 TIMES DAILY IF NEEDED FOR MUSCLE SPASM 60 Tab 2   • Insulin Degludec (TRESIBA FLEXTOUCH) 200 UNIT/ML Solution Pen-injector Inject 50 Units as instructed every bedtime. 3 PEN 2   • JARDIANCE 25 MG Tab Take 1 tablet by mouth every day. 30 Tab 3   • VICTOZA 18 MG/3ML Solution Pen-injector injection Inject 0.3 mL as instructed every day. 3 PEN 6   • pioglitazone (ACTOS) 30 MG Tab Take 1 Tab by mouth every day. 30 Tab 11   • Insulin Pen Needle (NOVOFINE PLUS) 32G X 4 MM Misc 1 Applicator by Does not apply route every day. Using one needle tip with victoza per day and tresiba 100 Each 11   • budesonide-formoterol (SYMBICORT) 160-4.5 MCG/ACT Aerosol " "Inhale 2 Puffs by mouth 1 time daily as needed.     • nicotine (NICODERM) 14 MG/24HR PATCH 24 HR Apply 1 Patch to skin as directed every 24 hours. 30 Patch 3   • albuterol 108 (90 Base) MCG/ACT Aero Soln inhalation aerosol Inhale 2 Puffs by mouth every 6 hours as needed for Shortness of Breath. 8.5 g 6   • SPIRIVA HANDIHALER 18 MCG Cap INHALE 1 CAPSULE BY MOUTH 1 TIME DAILY AS NEEDED (SHORTNESS OF BREATH). 3 Cap 2   • lisinopril (PRINIVIL, ZESTRIL) 40 MG tablet Take 1 Tab by mouth every day. 30 Tab 3   • atorvastatin (LIPITOR) 20 MG Tab Take 1 Tab by mouth every day. 30 Tab 3   • amlodipine (NORVASC) 10 MG Tab Take 1 Tab by mouth every day. 90 Tab 1   • hydrOXYzine HCl (ATARAX) 25 MG Tab Take 1 Tab by mouth 3 times a day as needed for Itching. 30 Tab 0   • esomeprazole (NEXIUM) 20 MG capsule Take 1 Cap by mouth every morning before breakfast. 30 Cap 3   • gabapentin (NEURONTIN) 100 MG Cap Take 1 Cap by mouth 3 times a day. 90 Cap 3   • clopidogrel (PLAVIX) 75 MG Tab Take 1 Tab by mouth every day. 30 Tab 3   • cyclobenzaprine (FLEXERIL) 10 MG Tab Take 10 mg by mouth every day.       No facility-administered encounter medications on file as of 4/19/2018.      ROS. The review of systems she was evaluated with the patient and his significant other. He complains of neck and back pain, heartburn, but all other responses are negative except as noted in the history of present illness.     Objective:   /90   Pulse 90   Ht 1.956 m (6' 5\")   Wt 105.7 kg (233 lb)   SpO2 96%   BMI 27.63 kg/m²     Physical Exam   Exam:    Vitals:    04/19/18 1604   BP: 146/90   Pulse: 90   SpO2: 96%   Weight: 105.7 kg (233 lb)   Height: 1.956 m (6' 5\")     Constitutional: Well developed, well nourished -American male in no acute distress. Appears approximate stated age and provides a good history.   HEENT: Normocephalic, pupils are equal and responsive. bilateral arcus. Conjunctiva and sclera are clear. No xanthelasma. No " diagonal ear lobe crease noted. Mucus membranes are moist and pink. Multiple missing teeth  Neck: No JVD or HJR. JVP = 4-5cm H2O. Good carotid upstroke with no bruit. No lymphadenopathy, No thyroid enlargement.  Cardiovascular: Regular rhythm, no ectopics. No murmur, gallop, rub or click. No lift, heave,  thrill, or cardiomegaly  Lungs: Normal effort, Clear to auscultation and percussion. No rales, rhonchi, wheezing   Abdomen: Soft, non tender. No liver, kidney, spleen or mass palpable. The abdominal aorta is not palpable. Active bowel sounds are noted and there is no bruit  Extremities:  No cyanosis, edema, clubbing. Palpable posterior tibial and dorsal pedal pulses bilaterally.   Skin:   Warm and dry, no active lesions  Neurologic: Alert & oriented. Strength and sensation grossly intact. No lateralizing signs  Psychiatric:  Affect, mood, and judgement are appropriate    Brain MRI reveals no discrete lesion  Carotid duplex reveals bilateral disease without significant stenosis  Echocardiogram reveals normal left ventricular ejection fraction with no wall motion abnormality    Results for STEFFEN JOE (MRN [id number]) as of 4/19/2018 16:56   Ref. Range 11/16/2017 09:40 2/21/2018 04:42   Cholesterol,Tot Latest Ref Range: 100 - 199 mg/dL 210 (H) 148   Triglycerides Latest Ref Range: 0 - 149 mg/dL 134 253 (H)   HDL Latest Ref Range: >=40 mg/dL 63 41   LDL Latest Ref Range: <100 mg/dL 120 (H) 56     ASSESSMENT:    1. Nonspecific abnormal electrocardiogram (ECG) (EKG)  NM-HEART MUSCLE IMAGE,SPECT SING   2. Essential hypertension     3. Hyperlipidemia, unspecified hyperlipidemia type     4. Type 2 diabetes mellitus with diabetic neuropathy, without long-term current use of insulin (CMS-[facility])     5. Other specified transient cerebral ischemias         Medical Decision Making:  Today's Assessment / Status / Plan:   I think Plavix is the correct choice of drugs for his TIA since he is allergic to aspirin. The patient's  blood pressure is a little bit elevated today but he says it's been more normal at home. I elected not to change any of his medications from that standpoint. Hyperlipidemia is noted above with high triglycerides and relatively low HDL. The LDL is low on his current regimen so I think this is satisfactory for the time being. Control of his diabetes has been difficult in the past but he appears to be very cooperative with therapy now and says that his blood sugars are improved.    His EKG is abnormal in that it shows inferior  Q waves that were not present in 2015. There is no history of an event in that timeframe but the EKG in 2017 shows  Q waves as it currently does. I think a nuclear perfusion study is indicated to document whether or not there has been myocardial injury. The echocardiogram did not show an inferior wall motion abnormality. If he has had an inferior MI further coronary abnormalities may be present which will require more intensive evaluation. The patient will follow up with Dr. Lara and will return here in about 2 months. We discussed the problem about treating TIAs in that no one really knows how to address these issues. Therefore, we are left with trying to control various risk factors. I will provide a prescription for clopidogrel to his pharmacy but this medicine does show up on his current medicine list but I'm not sure that he is actually taking it. He will call for the results of his nuclear perfusion study. It is imperative for him to stop smoking which we discussed

## 2018-04-20 ENCOUNTER — TELEPHONE (OUTPATIENT)
Dept: CARDIOLOGY | Facility: MEDICAL CENTER | Age: 53
End: 2018-04-20

## 2018-04-20 DIAGNOSIS — G45.9 TRANSIENT CEREBRAL ISCHEMIA, UNSPECIFIED TYPE: ICD-10-CM

## 2018-04-20 RX ORDER — CLOPIDOGREL BISULFATE 75 MG/1
75 TABLET ORAL DAILY
Qty: 30 TAB | Refills: 11 | Status: SHIPPED | OUTPATIENT
Start: 2018-04-20

## 2018-04-21 NOTE — TELEPHONE ENCOUNTER
Called and s/w pt. Notes that he is currently not taking Plavix and thought Dr. Warner was going to submit a prescription. Informed him that prescription will be submitted today to the [facility] pharmacy per pt request. He is to call back if any issues.     ----- Message from Cy Warner M.D. sent at 4/20/2018  5:34 PM PDT -----  Regarding: RE: status of plavix script?  Contact: [phone]    I think he is already taking it. It was on his list. If he is not, Plavix 75 mg a day would be good. He can't take ASA    ----- Message -----  From: Ingrid Alex R.N.  Sent: 4/20/2018   1:29 PM  To: Cy Warner M.D.  Subject: FW: status of plavix script?                     Do you want me to call in prescription for Plavix? You discussed it with pt yesterday. If so, which dose. Thx    ----- Message -----  From: Krystal Raman  Sent: 4/20/2018  12:12 PM  To: Ingrid Alex R.N.  Subject: status of plavix script?                         TF/ingrid    Pt calling to follow up on the script for plavix that TF talked with pt about yesterday.  Will TF be filling it?  Please call pt at .

## 2018-04-25 ENCOUNTER — OFFICE VISIT (OUTPATIENT)
Dept: MEDICAL GROUP | Facility: MEDICAL CENTER | Age: 53
End: 2018-04-25
Attending: FAMILY MEDICINE
Payer: MEDICAID

## 2018-04-25 VITALS
TEMPERATURE: 98.1 F | SYSTOLIC BLOOD PRESSURE: 138 MMHG | OXYGEN SATURATION: 98 % | BODY MASS INDEX: 27.98 KG/M2 | WEIGHT: 237 LBS | HEART RATE: 72 BPM | DIASTOLIC BLOOD PRESSURE: 90 MMHG | HEIGHT: 77 IN | RESPIRATION RATE: 14 BRPM

## 2018-04-25 DIAGNOSIS — E11.9 TYPE 2 DIABETES MELLITUS WITHOUT COMPLICATION, WITH LONG-TERM CURRENT USE OF INSULIN (HCC): ICD-10-CM

## 2018-04-25 DIAGNOSIS — Z79.4 TYPE 2 DIABETES MELLITUS WITHOUT COMPLICATION, WITH LONG-TERM CURRENT USE OF INSULIN (HCC): ICD-10-CM

## 2018-04-25 DIAGNOSIS — L30.9 ECZEMA, UNSPECIFIED TYPE: ICD-10-CM

## 2018-04-25 LAB
HBA1C MFR BLD: 8.3 % (ref ?–5.8)
INT CON NEG: NEGATIVE
INT CON POS: POSITIVE

## 2018-04-25 PROCEDURE — 99212 OFFICE O/P EST SF 10 MIN: CPT | Performed by: FAMILY MEDICINE

## 2018-04-25 PROCEDURE — 83036 HEMOGLOBIN GLYCOSYLATED A1C: CPT | Performed by: FAMILY MEDICINE

## 2018-04-25 PROCEDURE — 99213 OFFICE O/P EST LOW 20 MIN: CPT | Performed by: FAMILY MEDICINE

## 2018-04-25 RX ORDER — AMMONIUM LACTATE 12 G/100G
1 LOTION TOPICAL PRN
Qty: 1 BOTTLE | Refills: 3 | Status: SHIPPED | OUTPATIENT
Start: 2018-04-25 | End: 2018-06-14

## 2018-04-25 ASSESSMENT — ENCOUNTER SYMPTOMS
ABDOMINAL PAIN: 0
DIARRHEA: 0
PALPITATIONS: 0
COUGH: 0
RHINORRHEA: 0
SHORTNESS OF BREATH: 0
CHILLS: 0
VOMITING: 0
FEVER: 0
EYE PAIN: 0
NAUSEA: 0
NAIL CHANGES: 0
SORE THROAT: 0
FATIGUE: 0
ANOREXIA: 0
BACK PAIN: 1
TINGLING: 1
SPUTUM PRODUCTION: 0

## 2018-04-25 NOTE — PROGRESS NOTES
"Subjective:      Wagner Kenney is a 53 y.o. male who presents with Follow-Up (rash)            Rash   This is a new problem. The current episode started more than 1 month ago. The problem has been rapidly improving since onset. The affected locations include the neck and torso. The rash is characterized by itchiness and dryness. It is unknown if there was an exposure to a precipitant. Associated symptoms include joint pain. Pertinent negatives include no anorexia, congestion, cough, diarrhea, eye pain, facial edema, fatigue, fever, nail changes, rhinorrhea, shortness of breath, sore throat or vomiting. Past treatments include moisturizer and antihistamine.       Review of Systems   Constitutional: Negative for chills, fatigue and fever.   HENT: Negative for congestion, hearing loss, rhinorrhea, sore throat and tinnitus.    Eyes: Negative for pain.   Respiratory: Negative for cough, sputum production and shortness of breath.    Cardiovascular: Negative for chest pain and palpitations.   Gastrointestinal: Negative for abdominal pain, anorexia, diarrhea, nausea and vomiting.   Musculoskeletal: Positive for back pain and joint pain.   Skin: Positive for itching and rash. Negative for nail changes.   Neurological: Positive for tingling.          Objective:     /90   Pulse 72   Temp 36.7 °C (98.1 °F)   Resp 14   Ht 1.956 m (6' 5\")   Wt 107.5 kg (237 lb)   SpO2 98%   BMI 28.10 kg/m²      Physical Exam   Constitutional: He is oriented to person, place, and time. He appears well-developed and well-nourished.   HENT:   Head: Normocephalic and atraumatic.   Cardiovascular: Normal rate, regular rhythm and normal heart sounds.  Exam reveals no friction rub.    No murmur heard.  Pulmonary/Chest: Effort normal and breath sounds normal. No respiratory distress. He has no wheezes. He has no rales.   Abdominal: Soft. Bowel sounds are normal. He exhibits no distension. There is no tenderness.   Neurological: He is " alert and oriented to person, place, and time.   Skin: Skin is warm and dry.   Psychiatric: He has a normal mood and affect. His behavior is normal.   Nursing note and vitals reviewed.              Assessment/Plan:     1. Eczema, unspecified type  Will have him continue to use the moisturizer and antihistamine as directed. Will continue to follow.   - ammonium lactate (LAC-HYDRIN) 12 % Lotion; Apply 1 Application to affected area(s) as needed.  Dispense: 1 Bottle; Refill: 3

## 2018-05-01 ENCOUNTER — HOSPITAL ENCOUNTER (OUTPATIENT)
Dept: RADIOLOGY | Facility: MEDICAL CENTER | Age: 53
End: 2018-05-01
Attending: INTERNAL MEDICINE
Payer: MEDICAID

## 2018-05-01 DIAGNOSIS — R94.31 NONSPECIFIC ABNORMAL ELECTROCARDIOGRAM (ECG) (EKG): ICD-10-CM

## 2018-05-01 PROCEDURE — A9502 TC99M TETROFOSMIN: HCPCS

## 2018-05-01 PROCEDURE — 700111 HCHG RX REV CODE 636 W/ 250 OVERRIDE (IP)

## 2018-05-01 RX ORDER — REGADENOSON 0.08 MG/ML
INJECTION, SOLUTION INTRAVENOUS
Status: COMPLETED
Start: 2018-05-01 | End: 2018-05-01

## 2018-05-01 RX ADMIN — REGADENOSON 0.4 MG: 0.08 INJECTION, SOLUTION INTRAVENOUS at 10:08

## 2018-05-03 ENCOUNTER — TELEPHONE (OUTPATIENT)
Dept: CARDIOLOGY | Facility: MEDICAL CENTER | Age: 53
End: 2018-05-03

## 2018-05-03 NOTE — TELEPHONE ENCOUNTER
Pt dropped by office as a walkin to discuss heart cath more in detail. Dr. Warner okay to discuss information with pt and answer questions.     Per Dr. Warner, pt okay to move forward with scheduling heart cath. Please have Lex follow up. Pt notified that Lex will call him to schedule.     LARA Burnett

## 2018-05-03 NOTE — TELEPHONE ENCOUNTER
Patient is scheduled on 5-9-18 for a C w/poss with Dr. Nava. Patient was told to hold Jardiance,victoza,actos am day of procedure and to cut insulin in half the night before and hold am day of procedure. Patient was told to check in at 6:00am for a 7:30 procedure. H&P was done on 4-19-18 by Dr. Warner. Pre admit appt is on 5-8-18 at 8:45.

## 2018-05-03 NOTE — TELEPHONE ENCOUNTER
Pt notified of MPI results and Dr. Warner's recommendations. Educated him on process of cardiac angiogram and questions answered. Pt states that he is okay to move forward to schedule heart cath. Informed him that Lex will give him a call to schedule. He is appreciative of assistance and and denies further questions at this time.     To Lex MA    ----- Message from Cy Warner M.D. sent at 5/3/2018  8:07 AM PDT -----    He needs a heart cath because the nuc study reveals an inferior MI which was silent. Need to make sure this is not just the tip of the iceberg. Ill be glad to talk to him if needed. Thanks    ----- Message -----  From: Christine Alex R.N.  Sent: 5/1/2018   2:36 PM  To: Cy Warner M.D.    FU with TF 6/28. Per JM notes, information was discussed with TF

## 2018-05-08 ENCOUNTER — HOSPITAL ENCOUNTER (OUTPATIENT)
Dept: RADIOLOGY | Facility: MEDICAL CENTER | Age: 53
End: 2018-05-08
Attending: INTERNAL MEDICINE | Admitting: INTERNAL MEDICINE
Payer: MEDICAID

## 2018-05-08 DIAGNOSIS — Z01.812 PRE-PROCEDURAL LABORATORY EXAMINATION: ICD-10-CM

## 2018-05-08 DIAGNOSIS — Z01.810 PRE-OPERATIVE CARDIOVASCULAR EXAMINATION: ICD-10-CM

## 2018-05-08 DIAGNOSIS — Z01.811 PRE-OPERATIVE RESPIRATORY EXAMINATION: ICD-10-CM

## 2018-05-08 LAB
ALBUMIN SERPL BCP-MCNC: 4 G/DL (ref 3.2–4.9)
ALBUMIN/GLOB SERPL: 1.5 G/DL
ALP SERPL-CCNC: 66 U/L (ref 30–99)
ALT SERPL-CCNC: 47 U/L (ref 2–50)
ANION GAP SERPL CALC-SCNC: 8 MMOL/L (ref 0–11.9)
APTT PPP: 23.1 SEC (ref 24.7–36)
AST SERPL-CCNC: 59 U/L (ref 12–45)
BILIRUB SERPL-MCNC: 0.7 MG/DL (ref 0.1–1.5)
BUN SERPL-MCNC: 26 MG/DL (ref 8–22)
CALCIUM SERPL-MCNC: 9.3 MG/DL (ref 8.5–10.5)
CHLORIDE SERPL-SCNC: 107 MMOL/L (ref 96–112)
CO2 SERPL-SCNC: 21 MMOL/L (ref 20–33)
CREAT SERPL-MCNC: 1.34 MG/DL (ref 0.5–1.4)
EKG IMPRESSION: NORMAL
ERYTHROCYTE [DISTWIDTH] IN BLOOD BY AUTOMATED COUNT: 41.7 FL (ref 35.9–50)
GLOBULIN SER CALC-MCNC: 2.6 G/DL (ref 1.9–3.5)
GLUCOSE SERPL-MCNC: 159 MG/DL (ref 65–99)
HCT VFR BLD AUTO: 48.5 % (ref 42–52)
HGB BLD-MCNC: 16.4 G/DL (ref 14–18)
INR PPP: 0.94 (ref 0.87–1.13)
MCH RBC QN AUTO: 28.8 PG (ref 27–33)
MCHC RBC AUTO-ENTMCNC: 33.8 G/DL (ref 33.7–35.3)
MCV RBC AUTO: 85.1 FL (ref 81.4–97.8)
PLATELET # BLD AUTO: 217 K/UL (ref 164–446)
PMV BLD AUTO: 10.4 FL (ref 9–12.9)
POTASSIUM SERPL-SCNC: 4.2 MMOL/L (ref 3.6–5.5)
PROT SERPL-MCNC: 6.6 G/DL (ref 6–8.2)
PROTHROMBIN TIME: 12.3 SEC (ref 12–14.6)
RBC # BLD AUTO: 5.7 M/UL (ref 4.7–6.1)
SODIUM SERPL-SCNC: 136 MMOL/L (ref 135–145)
WBC # BLD AUTO: 8.6 K/UL (ref 4.8–10.8)

## 2018-05-08 PROCEDURE — 71046 X-RAY EXAM CHEST 2 VIEWS: CPT

## 2018-05-08 PROCEDURE — 93010 ELECTROCARDIOGRAM REPORT: CPT | Performed by: INTERNAL MEDICINE

## 2018-05-08 PROCEDURE — 80053 COMPREHEN METABOLIC PANEL: CPT

## 2018-05-08 PROCEDURE — 85027 COMPLETE CBC AUTOMATED: CPT

## 2018-05-08 PROCEDURE — 85610 PROTHROMBIN TIME: CPT

## 2018-05-08 PROCEDURE — 93005 ELECTROCARDIOGRAM TRACING: CPT

## 2018-05-08 PROCEDURE — 85730 THROMBOPLASTIN TIME PARTIAL: CPT

## 2018-05-08 PROCEDURE — 36415 COLL VENOUS BLD VENIPUNCTURE: CPT

## 2018-05-08 RX ORDER — LISINOPRIL 40 MG/1
TABLET ORAL
Qty: 90 TAB | Refills: 2 | Status: SHIPPED | OUTPATIENT
Start: 2018-05-08

## 2018-05-09 ENCOUNTER — HOSPITAL ENCOUNTER (OUTPATIENT)
Facility: MEDICAL CENTER | Age: 53
End: 2018-05-09
Attending: INTERNAL MEDICINE | Admitting: INTERNAL MEDICINE
Payer: MEDICAID

## 2018-05-09 VITALS
RESPIRATION RATE: 17 BRPM | SYSTOLIC BLOOD PRESSURE: 151 MMHG | TEMPERATURE: 97 F | WEIGHT: 235.45 LBS | HEIGHT: 77 IN | DIASTOLIC BLOOD PRESSURE: 87 MMHG | OXYGEN SATURATION: 96 % | HEART RATE: 75 BPM | BODY MASS INDEX: 27.8 KG/M2

## 2018-05-09 PROCEDURE — 93572 IV DOP VEL&/PRESS C FLO EA: CPT | Mod: RC

## 2018-05-09 PROCEDURE — C1769 GUIDE WIRE: HCPCS

## 2018-05-09 PROCEDURE — 160002 HCHG RECOVERY MINUTES (STAT)

## 2018-05-09 PROCEDURE — 360979 HCHG DIAGNOSTIC CATH

## 2018-05-09 PROCEDURE — 93458 L HRT ARTERY/VENTRICLE ANGIO: CPT

## 2018-05-09 PROCEDURE — 99152 MOD SED SAME PHYS/QHP 5/>YRS: CPT

## 2018-05-09 PROCEDURE — C1887 CATHETER, GUIDING: HCPCS

## 2018-05-09 PROCEDURE — 99153 MOD SED SAME PHYS/QHP EA: CPT

## 2018-05-09 PROCEDURE — 700111 HCHG RX REV CODE 636 W/ 250 OVERRIDE (IP)

## 2018-05-09 PROCEDURE — 304952 HCHG R 2 PADS

## 2018-05-09 PROCEDURE — C1894 INTRO/SHEATH, NON-LASER: HCPCS

## 2018-05-09 PROCEDURE — 700101 HCHG RX REV CODE 250

## 2018-05-09 PROCEDURE — 307093 HCHG TR BAND RADIAL

## 2018-05-09 PROCEDURE — 93571 IV DOP VEL&/PRESS C FLO 1ST: CPT | Mod: LD

## 2018-05-09 RX ORDER — VERAPAMIL HYDROCHLORIDE 2.5 MG/ML
INJECTION, SOLUTION INTRAVENOUS
Status: COMPLETED
Start: 2018-05-09 | End: 2018-05-09

## 2018-05-09 RX ORDER — HEPARIN SODIUM,PORCINE 1000/ML
VIAL (ML) INJECTION
Status: COMPLETED
Start: 2018-05-09 | End: 2018-05-09

## 2018-05-09 RX ORDER — MIDAZOLAM HYDROCHLORIDE 1 MG/ML
INJECTION INTRAMUSCULAR; INTRAVENOUS
Status: COMPLETED
Start: 2018-05-09 | End: 2018-05-09

## 2018-05-09 RX ORDER — ONDANSETRON 2 MG/ML
4 INJECTION INTRAMUSCULAR; INTRAVENOUS EVERY 4 HOURS PRN
Status: DISCONTINUED | OUTPATIENT
Start: 2018-05-09 | End: 2018-05-09 | Stop reason: HOSPADM

## 2018-05-09 RX ORDER — ADENOSINE 3 MG/ML
INJECTION, SOLUTION INTRAVENOUS
Status: COMPLETED
Start: 2018-05-09 | End: 2018-05-09

## 2018-05-09 RX ORDER — LIDOCAINE HYDROCHLORIDE 20 MG/ML
INJECTION, SOLUTION INFILTRATION; PERINEURAL
Status: COMPLETED
Start: 2018-05-09 | End: 2018-05-09

## 2018-05-09 RX ORDER — SODIUM CHLORIDE 9 MG/ML
INJECTION, SOLUTION INTRAVENOUS CONTINUOUS
Status: DISCONTINUED | OUTPATIENT
Start: 2018-05-09 | End: 2018-05-09 | Stop reason: HOSPADM

## 2018-05-09 RX ORDER — SODIUM CHLORIDE 9 MG/ML
INJECTION, SOLUTION INTRAVENOUS
Status: DISCONTINUED | OUTPATIENT
Start: 2018-05-09 | End: 2018-05-09 | Stop reason: HOSPADM

## 2018-05-09 RX ADMIN — NITROGLYCERIN 10 ML: 20 INJECTION INTRAVENOUS at 08:08

## 2018-05-09 RX ADMIN — MIDAZOLAM 2 MG: 1 INJECTION INTRAMUSCULAR; INTRAVENOUS at 08:39

## 2018-05-09 RX ADMIN — LIDOCAINE HYDROCHLORIDE: 20 INJECTION, SOLUTION INFILTRATION; PERINEURAL at 08:07

## 2018-05-09 RX ADMIN — FENTANYL CITRATE 50 MCG: 50 INJECTION, SOLUTION INTRAMUSCULAR; INTRAVENOUS at 08:40

## 2018-05-09 RX ADMIN — VERAPAMIL HYDROCHLORIDE 2.5 MG: 2.5 INJECTION, SOLUTION INTRAVENOUS at 08:08

## 2018-05-09 RX ADMIN — ADENOSINE 180 MG: 3 INJECTION, SOLUTION INTRAVENOUS at 08:31

## 2018-05-09 RX ADMIN — HEPARIN SODIUM 2000 UNITS: 200 INJECTION, SOLUTION INTRAVENOUS at 08:08

## 2018-05-09 RX ADMIN — ADENOSINE 90 MG: 3 INJECTION, SOLUTION INTRAVENOUS at 08:35

## 2018-05-09 RX ADMIN — HEPARIN SODIUM: 1000 INJECTION, SOLUTION INTRAVENOUS; SUBCUTANEOUS at 08:07

## 2018-05-09 RX ADMIN — SODIUM CHLORIDE: 9 INJECTION, SOLUTION INTRAVENOUS at 07:00

## 2018-05-09 RX ADMIN — SODIUM CHLORIDE: 9 INJECTION, SOLUTION INTRAVENOUS at 09:15

## 2018-05-09 ASSESSMENT — PAIN SCALES - GENERAL
PAINLEVEL_OUTOF10: 0

## 2018-05-09 NOTE — OR NURSING
0848   PATIENT RECEIVED FROM CATH LAB.  PATIENT IS A/A/OX4.  DENIES ANY PAIN.  RIGHT WRIST WITH TR BAND INTACT.  NO FAMILY IS @ BEDSIDE.      0900   CATH SITE TR BAND INTACT.  PATIENT TAKING PO FLUID AND SNACK OK.    1000   1ST 2CC OF AIR RELEASED FROM TR BAND.  SITE IS CLEAR.  FAMILY ARRIVES @ BEDSIDE.  DR VIGIL TALKING IN LENGTH WITH PATIENT AND FAMILY.    1015   NEXT 3CC OF AIR RELEASED FROM TR BAND.  SITE IS CLEAR.    1030   NEXT 3CC OF AIR RELEASED FROM TR BAND.  SITE IS CLEAR.    1045   LAST 3CC OF AIR RELEASED FROM TR BAND.  SITE IS CLEAR.    1050   TR BAND REMOVED AND 2X2 WITH TEGADERM AND COBAN APPLIED.    1100   DC INSTRUCTIONS GIVEN TO PATIENT AND FAMILY.  VERBALIZED UNDERSTANDING OF ALL.  HL DC.  PATIENT DC TO HOME,  AMBULATORY,  ESCORTED OUT BY RN.

## 2018-05-09 NOTE — PROCEDURES
REFERRING PHYSICIAN: Dr. Cy Warner    PREOPERATIVE DIAGNOSIS:  1.  Abnormal stress test/positive stress test  2.  Diabetes mellitus  3.  TIA    POSTOPERATIVE DIAGNOSIS:  1.   small LCx  2.  Moderate focal nonobstructive LAD CAD, FFR 0.86  3.  60% proximal RCA stenosis, FFR 0.96  4.  LVEF 59%      PROCEDURE PERFORMED:  Selective coronary angiography of the native vessels  Left heart catheterization  Left ventriculogram  Fractional flow reserve LAD  Fractional flow reserve RCA    DESCRIPTION OF PROCEDURE:  The risks and benefits of cardiac catheterization as well as the procedure itself, rationale and appropriateness were discussed with the patient today. Complications including but not limited to death, stroke, MI, urgent bypass surgery, contrast nephropathy, vascular complications, bleeding and infection were explained to the patient. The potential outcomes associated with the procedure (possible PCI, possible CABG, possible medical Rx only) were also discussed at length. The patient agreed to proceed.    The patient was transported to the catheterization laboratory in the fasting state. The right radial area and right groin were prepped and draped in the usual fashion. Right radial area was entered with a single through and through puncture and a 6F glide sheath was placed. An intra-arterial cocktail of heparin, verapamil and nitroglycerine was administered. Over a wire, a 5F Charissa  catheter was passed to the aortic root and used to engage the right and left coronaries without difficulty. Contrast was administered and multiple images obtained. This catheter was then exchanged for a 6 [place] Islander angled pigtail used to cross the aortic valve for LHC and contrast was administered at 10cc/s for 30cc's for left ventriculogram.     FRACTIONAL FLOW RESERVE (LAD)  The diagnostic catheter was exchanged for an appropriate guiding catheter, EBU 3.56 [place] Islander. Further heparin was administered to achieve an adequate ACT. The  pressure wire was zeroed in the hoop, passed just distal to the tip of the aorta and normalized. Following this it was navigated across the area of interest in the distal LAD. Contrast was cleared from the catheter, intracoronary nitroglycerin was administered followed by intravenous adenosine at 140 mcg/kg/m for 2 minutes to achieve maximum hyperemia. The FFR of this artery was 0.86.  Intervention was deferred.    FRACTIONAL FLOW RESERVE (RCA)  The catheter was exchanged for a 6 Romansh JR4 guide seated in the RCA. The pressure wire was zeroed in the hoop, passed just distal to the tip of the aorta and normalized. Following this it was navigated across the area of interest in the proximal RCA. Contrast was cleared from the catheter, intracoronary nitroglycerin was administered followed by intravenous adenosine at 140 mcg/kg/m for 2 minutes to achieve maximum hyperemia. The FFR of this artery was 0.96.  Intervention was deferred.    All catheters and guidewires were removed and a TR band was applied to achieve patent hemostasis. Patient left the cath lab in stable condition.      Moderate sedation directly monitored by me during the case while supervising the administration of the sedation medication by an independent trained RN to assist in the monitoring of the patient's level of consciousness and physiological status. I, the supervising physician was present the entire time from beginning of medication administration until the end of the procedure from 0755 until 0837. For detailed administration records please see the moderate sedation documentation in the median tab.      FINDINGS:  I. HEMODYNAMICS:    Ao: 137/92 mmHg   LEDP: 29 mmHg   Gradient on LV pullback: No    II. LEFT VENTRICULOGRAM:   LVEF BOLES PROJECTION: 59 %     III. CORONARY ANGIOGRAPHY:  Left Main: Large trifurcating no CAD.  Left Anterior Descending: Large wrapping around the apex there is a 50% proximal stenosis and a focal 50% concentric stenosis.   FFR across these lesions is 0.86 and intervention was deferred.  Ramus intermedius: Large vessel with mild nonobstructive epicardial CAD.  Left Circumflex: Small moderate-sized and chronic totally occluded in its proximal portion receiving very faint collaterals and a left left fashion.  Right Coronary: Small to moderate sized with a 60% eccentric focal proximal stenosis.  FFR across this lesion is 0.96 and intervention was deferred.    COMPLICATIONS: none apparent    CONCLUSIONS:  1.   small LCx  2.  Moderate focal nonobstructive LAD CAD, FFR 0.86  3.  60% proximal RCA stenosis, FFR 0.96  4.  LVEF 59%    RECOMMENDATIONS:  Medical therapy

## 2018-05-09 NOTE — DISCHARGE INSTRUCTIONS
ACTIVITY: Rest and take it easy for the first 24 hours.  A responsible adult is recommended to remain with you during that time.  It is normal to feel sleepy.  We encourage you to not do anything that requires balance, judgment or coordination.    MILD FLU-LIKE SYMPTOMS ARE NORMAL. YOU MAY EXPERIENCE GENERALIZED MUSCLE ACHES, THROAT IRRITATION, HEADACHE AND/OR SOME NAUSEA.    FOR 24 HOURS DO NOT:  Drive, operate machinery or run household appliances.  Drink beer or alcoholic beverages.   Make important decisions or sign legal documents.    SPECIAL INSTRUCTIONS: *KEEP INCISION DRY FOR 24 HRS**    DIET: To avoid nausea, slowly advance diet as tolerated, avoiding spicy or greasy foods for the first day.  Add more substantial food to your diet according to your physician's instructions.  Babies can be fed formula or breast milk as soon as they are hungry.  INCREASE FLUIDS AND FIBER TO AVOID CONSTIPATION.    SURGICAL DRESSING/BATHING: *MAY SHOWER TOMORROW AFTERNOON THEN MAY REMOVE DRESSING**    FOLLOW-UP APPOINTMENT:  A follow-up appointment should be arranged with your doctor in *[facility]    [phone]**; call to schedule.    You should CALL YOUR PHYSICIAN if you develop:  Fever greater than 101 degrees F.  Pain not relieved by medication, or persistent nausea or vomiting.  Excessive bleeding (blood soaking through dressing) or unexpected drainage from the wound.  Extreme redness or swelling around the incision site, drainage of pus or foul smelling drainage.  Inability to urinate or empty your bladder within 8 hours.  Problems with breathing or chest pain.    You should call 911 if you develop problems with breathing or chest pain.  If you are unable to contact your doctor or surgical center, you should go to the nearest emergency room or urgent care center.  Physician's telephone #: *[phone]**    If any questions arise, call your doctor.  If your doctor is not available, please feel free to call the Surgical  Center at [phone].  The Center is open Monday through Friday from 7AM to 7PM.  You can also call the HEALTH HOTLINE open 24 hours/day, 7 days/week and speak to a nurse at [phone], or toll free at [phone].    A registered nurse may call you a few days after your surgery to see how you are doing after your procedure.    MEDICATIONS: Resume taking daily medication.  Take prescribed pain medication with food.  If no medication is prescribed, you may take non-aspirin pain medication if needed.  PAIN MEDICATION CAN BE VERY CONSTIPATING.  Take a stool softener or laxative such as senokot, pericolace, or milk of magnesia if needed.      If your physician has prescribed pain medication that includes Acetaminophen (Tylenol), do not take additional Acetaminophen (Tylenol) while taking the prescribed medication.    Depression / Suicide Risk    As you are discharged from this [facility] facility, it is important to learn how to keep safe from harming yourself.    Recognize the warning signs:  · Abrupt changes in personality, positive or negative- including increase in energy   · Giving away possessions  · Change in eating patterns- significant weight changes-  positive or negative  · Change in sleeping patterns- unable to sleep or sleeping all the time   · Unwillingness or inability to communicate  · Depression  · Unusual sadness, discouragement and loneliness  · Talk of wanting to die  · Neglect of personal appearance   · Rebelliousness- reckless behavior  · Withdrawal from people/activities they love  · Confusion- inability to concentrate     If you or a loved one observes any of these behaviors or has concerns about self-harm, here's what you can do:  · Talk about it- your feelings and reasons for harming yourself  · Remove any means that you might use to hurt yourself (examples: pills, rope, extension cords, firearm)  · Get professional help from the community (Mental Health, Substance Abuse,  psychological counseling)  · Do not be alone:Call your Safe Contact- someone whom you trust who will be there for you.  · Call your local CRISIS HOTLINE 642-5001 or 519-034-2414  · Call your local Children's Mobile Crisis Response Team [place] (918) 189-0557 or www.OurHouse  · Call the toll free National Suicide Prevention Hotlines   · National Suicide Prevention Lifeline 259-730-VXBF (3057)  [facility] Line Network 800-SUICIDE (872-0392)    Radial Catherization Discharge Instructions      · Do not subject hand/arm to any forceful movements for 24 hours    i.e. supporting weight when rising from the chair or bed.   · Do not drive a car for 24 hours  · You may remove the dressing tomorrow  · You may shower on the day following your procedure.  Do not take a tub bath or submerge the puncture site in water for 3 days following the procedure.  · No Lifting more than 3-5 pounds with affected wrist for 5 days  · Follow up with Dr. HDZ**  2-4 weeks.  · Increase fluids for 2 days post procedure.  · Continue all previous medications unless otherwise instructed.    If bleeding should occur following discharge:  · Sit down and apply firm pressure to site with your fingers for 10 minutes  · If the bleeding stops, continue to sit quietly, keeping your wrist straight for 2 hours.  Notify physician as soon as possible ( [phone])  · If bleeding does not stop after 10 minutes, or if there is a large amount of bleeding or spurting, call 911 immediately.  Do not drive yourself to the hospital.

## 2018-05-10 ENCOUNTER — TELEPHONE (OUTPATIENT)
Dept: CARDIOLOGY | Facility: MEDICAL CENTER | Age: 53
End: 2018-05-10

## 2018-05-10 DIAGNOSIS — I25.83 CORONARY ARTERY DISEASE DUE TO LIPID RICH PLAQUE: ICD-10-CM

## 2018-05-10 DIAGNOSIS — E11.40 TYPE 2 DIABETES MELLITUS WITH DIABETIC NEUROPATHY, WITHOUT LONG-TERM CURRENT USE OF INSULIN (HCC): ICD-10-CM

## 2018-05-10 DIAGNOSIS — Z72.0 TOBACCO ABUSE: ICD-10-CM

## 2018-05-10 DIAGNOSIS — I25.10 CORONARY ARTERY DISEASE DUE TO LIPID RICH PLAQUE: ICD-10-CM

## 2018-05-10 NOTE — TELEPHONE ENCOUNTER
Pt notified of results and Dr. Warner's recommendations. He is appreciative of call and will get blood work done prior to June appointment.    ----- Message -----  From: Cy Warner M.D.  Sent: 5/10/2018   9:21 AM  To: Christine Alex R.N.    Patient has stable CAD - no intervention necessary. The focus should be on risk factor modification. Follow up in 2-3 months with lipids and A1C 1 week before

## 2018-05-24 ENCOUNTER — OFFICE VISIT (OUTPATIENT)
Dept: ENDOCRINOLOGY | Facility: MEDICAL CENTER | Age: 53
End: 2018-05-24
Payer: MEDICAID

## 2018-05-24 VITALS
HEART RATE: 99 BPM | DIASTOLIC BLOOD PRESSURE: 88 MMHG | BODY MASS INDEX: 28.57 KG/M2 | WEIGHT: 242 LBS | HEIGHT: 77 IN | OXYGEN SATURATION: 98 % | SYSTOLIC BLOOD PRESSURE: 140 MMHG

## 2018-05-24 DIAGNOSIS — I10 ESSENTIAL HYPERTENSION: ICD-10-CM

## 2018-05-24 DIAGNOSIS — E11.65 TYPE 2 DIABETES MELLITUS WITH HYPERGLYCEMIA, UNSPECIFIED WHETHER LONG TERM INSULIN USE (HCC): ICD-10-CM

## 2018-05-24 PROCEDURE — 99214 OFFICE O/P EST MOD 30 MIN: CPT | Performed by: PHYSICIAN ASSISTANT

## 2018-05-24 RX ORDER — HYDROXYZINE HYDROCHLORIDE 25 MG/1
TABLET, FILM COATED ORAL
Qty: 30 TAB | Refills: 0 | Status: SHIPPED | OUTPATIENT
Start: 2018-05-24 | End: 2018-07-12 | Stop reason: SDUPTHER

## 2018-05-24 NOTE — TELEPHONE ENCOUNTER
Was the patient seen in the last year in this department? Yes     Does patient have an active prescription for medications requested? No      Received Request Via: Pharmacy

## 2018-05-24 NOTE — PROGRESS NOTES
Return to office Patient Consult Note  Referred by: Eleuterio Lara M.D.    Reason for consult: Diabetes Management Type 2    HPI:  Wagner Kenney is a 53 y.o. old patient who is seeing us today for diabetes care.  This is a pleasant patient with diabetes and I appreciate the opportunity to participate in the care of this patient.    hbA1c of 4/25/18 is 8.3    c-peptide is 1.6  Labs of 12/26/17 HbA1c is 13.3, GFR>60,    HbA1c of  7/20/17 is 12.7   HbA1c of 11/7/16 is 13.2  HbA1c of 12/2/15 was 12.9  HbA1c of 2/21/14 was 10.5    BG Diary:5/24/2018  In the AM: no log today    BG Diary:2/23/2018  In the AM: 108, 149, 184, 137, 103, 85, 131, 139  Before Bed: 165, 149, 235, 205, 170, 133, 97, 139     BG Diary:2/15/2018  In the AM: 128, 95, 173, 238, 194, 120, 156, 158, 149  Before Bed: 202, 180, 336, 200, 264, 165, 149, 235         No eye issues and seeing them in April  Feet numbness and burning     Weight on 2/2/18 he was 230 pounds and today he is 231      1. Type 2 diabetes mellitus with hyperglycemia, unspecified whether long term insulin use (HCC)    This is a new patient with me 2/2/18     He was on:  1.  Januvia  2.  Novolog  (once a day before breakfast 40units)  3.  Lantus  (has not been using)          1. Type 2 diabetes mellitus with hyperglycemia, without long-term current use of insulin (CMS-[facility])  This is a new patient with me 2/2/18     He is on:  1.  Januvia  2.  Novolog  (once a day before breakfast 40units)  3.  Lantus  (has not been using)        2. Encounter for long-term (current) use of insulin (CMS-HCC)   I had him  STOP  1.  Januvia  2.  Novolog  (once a day before breakfast 40units)  3.  Lantus  (has not been using)     NOW ON:  1.  Tresiba 36 units at night before bed  2.   Victoza 1.2 at night before bed  3.   Jardiance 25mg once a day  4.   Actos 30mg one a day    We are still not at goal as of today.  A drop form 13.3 to 8.3 is ok but We are still looking to get below 7 HbA1c     3.  Essential hypertension  This is stable        ROS:   Constitutional: No night sweats.  Eyes:  No visual changes.  Cardiac: No chest pain, No palpitations or racing heart rate.  Resp: No shortness of breath, No cough,   Gi: No Diarrhea    All other systems were reviewed and were/are negative.  The ROS was revised/revisited during this office visit from the patients first office visit with me on 2/2/18. Please review the full ROS during the first office visit.    Past Medical History:  Patient Active Problem List    Diagnosis Date Noted   • Numbness 02/20/2018     Priority: High   • COPD with acute exacerbation ([facility]) 02/13/2016     Priority: High   • Type 2 diabetes mellitus with hyperglycemia ([facility]) 02/13/2016     Priority: Medium   • Type 2 diabetes mellitus with neurologic complication ([facility]) 02/13/2016     Priority: Medium   • Diabetes 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, with long-term current use of insulin ([facility]) 07/23/2015     Priority: Medium   • Chronic kidney disease (CKD), stage III (moderate) 07/26/2012     Priority: Medium   • HTN (hypertension) 07/15/2009     Priority: Medium   • Hyponatremia 02/20/2018     Priority: Low   • Type 2 diabetes mellitus with diabetic neuropathy, without long-term current use of insulin ([facility]) 06/12/2017     Priority: Low   • Tobacco abuse 07/17/2015     Priority: Low   • HLD (hyperlipidemia) 01/13/2012     Priority: Low   • COPD (chronic obstructive pulmonary disease) ([facility]) 03/31/2010     Priority: Low   • Osteoarthritis of spine with radiculopathy, cervical region 09/09/2016   • Hyperlipidemia associated with type 2 diabetes mellitus ([facility]) 10/06/2015   • Vitamin d deficiency 07/30/2013   • Renal cyst 07/26/2012   • Right-sided heart failure ([facility]) 05/17/2010   • ANABELA (obstructive sleep apnea) 04/14/2010   • GERD (gastroesophageal reflux disease) 07/15/2009   • Chronic low back pain 07/15/2009       Past Surgical History:  Past Surgical History:    Procedure Laterality Date   • OTHER ORTHOPEDIC SURGERY  knee       Allergies:  Apple; Asa [aspirin]; and Metformin    Social History:  Social History     Social History   • Marital status: Single     Spouse name: N/A   • Number of children: N/A   • Years of education: N/A     Occupational History   • Not on file.     Social History Main Topics   • Smoking status: Current Every Day Smoker     Packs/day: 0.50     Years: 25.00     Types: Cigarettes   • Smokeless tobacco: Never Used   • Alcohol use 0.0 - 0.6 oz/week      Comment: Pint/week   • Drug use: No   • Sexual activity: Not on file     Other Topics Concern   •  Service No   • Blood Transfusions No   • Caffeine Concern No   • Occupational Exposure No   • Hobby Hazards No   • Sleep Concern No     hard to sleep   • Stress Concern No   • Weight Concern No   • Special Diet Yes     diabetic    • Back Care Yes   • Exercise Yes     little bit   • Bike Helmet No     does not ride bike    • Seat Belt Yes   • Self-Exams No     Social History Narrative   • No narrative on file       Family History:  Family History   Problem Relation Age of Onset   • Hypertension Mother    • Diabetes Father    • Hypertension Father    • Diabetes Sister        Medications:    Current Outpatient Prescriptions:   •  lisinopril (PRINIVIL, ZESTRIL) 40 MG tablet, TAKE 1 TABLET ORALLY ONCE DAILY, Disp: 90 Tab, Rfl: 2  •  ammonium lactate (LAC-HYDRIN) 12 % Lotion, Apply 1 Application to affected area(s) as needed., Disp: 1 Bottle, Rfl: 3  •  clopidogrel (PLAVIX) 75 MG Tab, Take 1 Tab by mouth every day., Disp: 30 Tab, Rfl: 11  •  hydrOXYzine HCl (ATARAX) 25 MG Tab, Take 1 Tab by mouth 3 times a day as needed for Itching., Disp: 30 Tab, Rfl: 0  •  raNITidine (ZANTAC) 150 MG Tab, TAKE 1 TABLET ORALLY 2 TIMES DAILY IF NEEDED FOR HEARTBURN, Disp: 60 Tab, Rfl: 2  •  Insulin Degludec (TRESIBA FLEXTOUCH) 200 UNIT/ML Solution Pen-injector, Inject 50 Units as instructed every bedtime., Disp: 3 PEN,  "Rfl: 2  •  JARDIANCE 25 MG Tab, Take 1 tablet by mouth every day., Disp: 30 Tab, Rfl: 3  •  VICTOZA 18 MG/3ML Solution Pen-injector injection, Inject 0.3 mL as instructed every day., Disp: 3 PEN, Rfl: 6  •  pioglitazone (ACTOS) 30 MG Tab, Take 1 Tab by mouth every day., Disp: 30 Tab, Rfl: 11  •  Insulin Pen Needle (NOVOFINE PLUS) 32G X 4 MM Misc, 1 Applicator by Does not apply route every day. Using one needle tip with victoza per day and tresiba, Disp: 100 Each, Rfl: 11  •  budesonide-formoterol (SYMBICORT) 160-4.5 MCG/ACT Aerosol, Inhale 2 Puffs by mouth 1 time daily as needed., Disp: , Rfl:   •  cyclobenzaprine (FLEXERIL) 10 MG Tab, Take 10 mg by mouth every day., Disp: , Rfl:   •  nicotine (NICODERM) 14 MG/24HR PATCH 24 HR, Apply 1 Patch to skin as directed every 24 hours., Disp: 30 Patch, Rfl: 3  •  albuterol 108 (90 Base) MCG/ACT Aero Soln inhalation aerosol, Inhale 2 Puffs by mouth every 6 hours as needed for Shortness of Breath., Disp: 8.5 g, Rfl: 6  •  SPIRIVA HANDIHALER 18 MCG Cap, INHALE 1 CAPSULE BY MOUTH 1 TIME DAILY AS NEEDED (SHORTNESS OF BREATH)., Disp: 3 Cap, Rfl: 2  •  atorvastatin (LIPITOR) 20 MG Tab, Take 1 Tab by mouth every day., Disp: 30 Tab, Rfl: 3  •  amlodipine (NORVASC) 10 MG Tab, Take 1 Tab by mouth every day., Disp: 90 Tab, Rfl: 1        Physical Examination:   Vital signs: /88   Pulse 99   Ht 1.956 m (6' 5.01\")   Wt 109.8 kg (242 lb)   SpO2 98%   BMI 28.69 kg/m²   General: No distress, cooperative, well dressed and well nourished.   Eyes: No scleral icterus or discharge, No hyposphagma  ENMT: Normal on external inspection of nose, lips, No nasal drainage   Neck: No abnormal masses on inspection  Resp: Normal effort, Bilateral clear to auscultation, No wheezing, No rales  CVS: Regular rate and rhythm, S1 S2 normal, No murmur. No gallop  Extremities: No edema bilateral extremities  Neuro: Alert and oriented  Skin: No rash, No Ulcers  Psych: Normal mood and " affect      Assessment and Plan:    1. Type 2 diabetes mellitus with hyperglycemia, unspecified whether long term insulin use (HCC)    NOW ON:  1.  Tresiba 36 units at night before bed  2.   Victoza 1.2 at night before bed (INCREASE to 1.8)  3.   Jardiance 25mg once a day  4.   Actos 30mg one a day    We are still not at goal as of today.  A drop form 13.3 to 8.3 is ok but We are still looking to get below 7 HbA1c    2. Essential hypertension  This is stable today and no need for adjustment    Return in about 3 months (around 8/24/2018).    Blood glucose log: Check BG in the morning when wake up, before lunch or dinner and before bed.  So three times a day.  Always bring BG diary to the next office visit.     Thank you kindly for allowing me to participate in the diabetes care plan for this patient.    Anam Mars PA-C, BC-ADM  Board Certified - Advanced Diabetes Management  05/24/18    CC:   Eleuterio Lara M.D.

## 2018-05-24 NOTE — PATIENT INSTRUCTIONS
NOW ON:  1.  Tresiba 36 units at night before bed  2.   Victoza 1.2 at night before bed (INCREASE to 1.8)  3.   Jardiance 25mg once a day  4.   Actos 30mg one a day

## 2018-06-07 ENCOUNTER — OFFICE VISIT (OUTPATIENT)
Dept: MEDICAL GROUP | Facility: MEDICAL CENTER | Age: 53
End: 2018-06-07
Attending: FAMILY MEDICINE
Payer: MEDICAID

## 2018-06-07 VITALS
OXYGEN SATURATION: 97 % | HEART RATE: 88 BPM | TEMPERATURE: 98 F | BODY MASS INDEX: 28.1 KG/M2 | SYSTOLIC BLOOD PRESSURE: 145 MMHG | DIASTOLIC BLOOD PRESSURE: 85 MMHG | RESPIRATION RATE: 14 BRPM | WEIGHT: 238 LBS | HEIGHT: 77 IN

## 2018-06-07 DIAGNOSIS — J44.9 CHRONIC OBSTRUCTIVE PULMONARY DISEASE, UNSPECIFIED COPD TYPE (HCC): ICD-10-CM

## 2018-06-07 DIAGNOSIS — E08.21 DIABETES MELLITUS DUE TO UNDERLYING CONDITION WITH DIABETIC NEPHROPATHY, WITH LONG-TERM CURRENT USE OF INSULIN (HCC): ICD-10-CM

## 2018-06-07 DIAGNOSIS — Z79.4 DIABETES MELLITUS DUE TO UNDERLYING CONDITION WITH DIABETIC NEPHROPATHY, WITH LONG-TERM CURRENT USE OF INSULIN (HCC): ICD-10-CM

## 2018-06-07 DIAGNOSIS — I10 ESSENTIAL HYPERTENSION: ICD-10-CM

## 2018-06-07 DIAGNOSIS — J00 ACUTE NASOPHARYNGITIS: ICD-10-CM

## 2018-06-07 PROCEDURE — 99214 OFFICE O/P EST MOD 30 MIN: CPT | Performed by: FAMILY MEDICINE

## 2018-06-07 PROCEDURE — 99212 OFFICE O/P EST SF 10 MIN: CPT | Performed by: FAMILY MEDICINE

## 2018-06-07 ASSESSMENT — ENCOUNTER SYMPTOMS
DIARRHEA: 0
NAUSEA: 0
TINGLING: 1
SINUS PAIN: 0
ABDOMINAL PAIN: 0
FEVER: 0
WHEEZING: 0
SPUTUM PRODUCTION: 0
CHILLS: 0
SHORTNESS OF BREATH: 0
PALPITATIONS: 0
NECK PAIN: 0
COUGH: 1
SORE THROAT: 0
HEADACHES: 0
VOMITING: 0
SWOLLEN GLANDS: 0
RHINORRHEA: 1

## 2018-06-07 NOTE — PROGRESS NOTES
Subjective:      Wagner Kenney is a 53 y.o. male who presents with Follow-Up (heart problem)            Patient here for a recent URI, hypertension, diabetes and COPD.    He has been doing well with his current medications and is not needing any medications refilled today. He states that he was recently seen by a cardiologist and told that he does not need any stents placed. We'll have him continue to keep track of his blood pressures at home keeping notes. If he has any continued elevations of his blood pressure will become symptomatic will adjust his medications.    He will also continue to follow-up with his diabetic specialist for management of his diabetes. He'll also check his feet daily for any skin changes or changes in sensation. His last hemoglobin A1c was We will continue to follow.     Current medications, allergies, and problem list reviewed with patient and updated in EPIC.        URI    This is a new problem. The current episode started in the past 7 days. The problem has been rapidly improving. There has been no fever. Associated symptoms include congestion, coughing and rhinorrhea. Pertinent negatives include no abdominal pain, chest pain, diarrhea, dysuria, ear pain, headaches, joint pain, joint swelling, nausea, neck pain, plugged ear sensation, rash, sinus pain, sneezing, sore throat, swollen glands, vomiting or wheezing. He has tried nothing for the symptoms.       Review of Systems   Constitutional: Negative for chills and fever.   HENT: Positive for congestion and rhinorrhea. Negative for ear pain, sinus pain, sneezing and sore throat.    Respiratory: Positive for cough. Negative for sputum production, shortness of breath and wheezing.    Cardiovascular: Negative for chest pain and palpitations.   Gastrointestinal: Negative for abdominal pain, diarrhea, nausea and vomiting.   Genitourinary: Negative for dysuria.   Musculoskeletal: Negative for joint pain and neck pain.   Skin: Negative  "for rash.   Neurological: Positive for tingling. Negative for headaches.          Objective:     /85   Pulse 88   Temp 36.7 °C (98 °F)   Resp 14   Ht 1.956 m (6' 5\")   Wt 108 kg (238 lb)   SpO2 97%   BMI 28.22 kg/m²      Physical Exam   Constitutional: He is oriented to person, place, and time. He appears well-developed and well-nourished.   HENT:   Head: Normocephalic and atraumatic.   congestion   Cardiovascular: Normal rate, regular rhythm and normal heart sounds.  Exam reveals no friction rub.    No murmur heard.  Pulmonary/Chest: Effort normal and breath sounds normal. No respiratory distress. He has no wheezes. He has no rales.   Abdominal: Soft. Bowel sounds are normal. He exhibits no distension. There is no tenderness.   Neurological: He is alert and oriented to person, place, and time.   Skin: Skin is warm and dry.   Psychiatric: He has a normal mood and affect. His behavior is normal.   Nursing note and vitals reviewed.              Assessment/Plan:     1. Essential hypertension  Will have patient continue to use his medication as directed. He has been advised to monitor blood pressure at home and keep notes. If blood pressure elevated or having symptoms of CP, SOB or neurologic changes to go to the er.      2. Diabetes 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, with long-term current use of insulin ([facility])  Will have him continue to use his medication as directed by his specialist. We'll continue to follow.    3. Chronic obstructive pulmonary disease, unspecified COPD type ([facility])  Will have him continue to use his inhalers as directed. He has not had any recent exacerbations. We'll continue to follow.    4. Acute nasopharyngitis  Symptoms are improving. We'll continue to follow.          "

## 2018-06-14 ENCOUNTER — HOSPITAL ENCOUNTER (EMERGENCY)
Facility: MEDICAL CENTER | Age: 53
End: 2018-06-14
Attending: EMERGENCY MEDICINE
Payer: MEDICAID

## 2018-06-14 VITALS
WEIGHT: 240.52 LBS | DIASTOLIC BLOOD PRESSURE: 109 MMHG | HEIGHT: 77 IN | HEART RATE: 80 BPM | OXYGEN SATURATION: 98 % | RESPIRATION RATE: 17 BRPM | SYSTOLIC BLOOD PRESSURE: 157 MMHG | BODY MASS INDEX: 28.4 KG/M2 | TEMPERATURE: 98.6 F

## 2018-06-14 DIAGNOSIS — M25.531 ARTHRALGIA OF RIGHT WRIST: ICD-10-CM

## 2018-06-14 DIAGNOSIS — M25.532 ARTHRALGIA OF LEFT WRIST: ICD-10-CM

## 2018-06-14 LAB
RHEUMATOID FACT SER IA-ACNC: <10 IU/ML (ref 0–14)
URATE SERPL-MCNC: 8.1 MG/DL (ref 2.5–8.3)

## 2018-06-14 PROCEDURE — 84550 ASSAY OF BLOOD/URIC ACID: CPT

## 2018-06-14 PROCEDURE — 99284 EMERGENCY DEPT VISIT MOD MDM: CPT

## 2018-06-14 PROCEDURE — 86431 RHEUMATOID FACTOR QUANT: CPT

## 2018-06-14 PROCEDURE — 86038 ANTINUCLEAR ANTIBODIES: CPT

## 2018-06-14 RX ORDER — TIOTROPIUM BROMIDE 18 UG/1
18 CAPSULE ORAL; RESPIRATORY (INHALATION) DAILY
Status: SHIPPED | COMMUNITY
End: 2018-10-03 | Stop reason: SDUPTHER

## 2018-06-14 RX ORDER — IBUPROFEN 200 MG
1000 TABLET ORAL EVERY 8 HOURS PRN
Status: SHIPPED | COMMUNITY
End: 2018-06-14

## 2018-06-14 RX ORDER — HYDROCODONE BITARTRATE AND ACETAMINOPHEN 5; 325 MG/1; MG/1
1-2 TABLET ORAL EVERY 6 HOURS PRN
Qty: 15 TAB | Refills: 0 | Status: SHIPPED | OUTPATIENT
Start: 2018-06-14 | End: 2018-06-17

## 2018-06-14 RX ORDER — INDOMETHACIN 50 MG/1
50 CAPSULE ORAL 3 TIMES DAILY PRN
Qty: 30 CAP | Refills: 1 | Status: SHIPPED | OUTPATIENT
Start: 2018-06-14 | End: 2018-06-21

## 2018-06-14 ASSESSMENT — PAIN SCALES - GENERAL
PAINLEVEL_OUTOF10: 10
PAINLEVEL_OUTOF10: 10

## 2018-06-14 NOTE — ED NOTES
Med rec updated and complete  Allergies reviewed  Pt reports no antibiotics in the last 30 days.  Pt reports no vitamins.

## 2018-06-14 NOTE — DISCHARGE INSTRUCTIONS
Recheck with Dr. Lara in 48 hours regarding lab results and joint swelling  Return to the ER for fever, worsening pain, or other concerns  Take Indocin as prescribed for inflammation  Take Norco as needed for severe pain    You are being prescribed a narcotic. Narcotics are addictive. Please take the narcotic sparingly and only as needed for pain. Do not drink alcohol, operate heavy machinery, or drive while taking a narcotic as it may impair your judgment and motor skills. If the narcotic contains acetaminophen, you should not take other acetaminophen containing products, such as Tylenol, while taking this medication as it may affect your liver.      Joint Pain  Joint pain, which is also called arthralgia, can be caused by many things. Joint pain often goes away when you follow your health care provider's instructions for relieving pain at home. However, joint pain can also be caused by conditions that require further treatment. Common causes of joint pain include:  · Bruising in the area of the joint.  · Overuse of the joint.  · Wear and tear on the joints that occur with aging (osteoarthritis).  · Various other forms of arthritis.  · A buildup of a crystal form of uric acid in the joint (gout).  · Infections of the joint (septic arthritis) or of the bone (osteomyelitis).  Your health care provider may recommend medicine to help with the pain. If your joint pain continues, additional tests may be needed to diagnose your condition.  Follow these instructions at home:  Watch your condition for any changes. Follow these instructions as directed to lessen the pain that you are feeling.  · Take medicines only as directed by your health care provider.  · Rest the affected area for as long as your health care provider says that you should. If directed to do so, raise the painful joint above the level of your heart while you are sitting or lying down.  · Do not do things that cause or worsen pain.  · If directed, apply  ice to the painful area:  ¨ Put ice in a plastic bag.  ¨ Place a towel between your skin and the bag.  ¨ Leave the ice on for 20 minutes, 2-3 times per day.  · Wear an elastic bandage, splint, or sling as directed by your health care provider. Loosen the elastic bandage or splint if your fingers or toes become numb and tingle, or if they turn cold and blue.  · Begin exercising or stretching the affected area as directed by your health care provider. Ask your health care provider what types of exercise are safe for you.  · Keep all follow-up visits as directed by your health care provider. This is important.  Contact a health care provider if:  · Your pain increases, and medicine does not help.  · Your joint pain does not improve within 3 days.  · You have increased bruising or swelling.  · You have a fever.  · You lose 10 lb (4.5 kg) or more without trying.  Get help right away if:  · You are not able to move the joint.  · Your fingers or toes become numb or they turn cold and blue.  This information is not intended to replace advice given to you by your health care provider. Make sure you discuss any questions you have with your health care provider.  Document Released: 12/18/2006 Document Revised: 05/19/2017 Document Reviewed: 09/29/2015  Elsevier Interactive Patient Education © 2017 [employer]

## 2018-06-14 NOTE — ED NOTES
Patient verbalized understanding of discharge instructions including order not to drive or drink alcohol for 6-12 hrs following narcotic use, no questions at this time. Pt hypertensive , VS otherwise stable. Has not taken daily BP medication yet, ERP approved pt d/c and directed to take today's BP medication as prescribed. Patient will ambulate to exit with d/c instructions and rx in hand. Narcotic consent form discussed with and signed by pt.

## 2018-06-14 NOTE — ED PROVIDER NOTES
"ED Provider Note    CHIEF COMPLAINT  Chief Complaint   Patient presents with   • Joint Pain     Bilateral wrists.  Started in L fingers 2 days ago.   Hx gout   • Neck Pain     Stiffness       HPI  Wagner Kenney is a 53 y.o. male with a history of gout who presents complaining of bilateral wrist pain and neck stiffness for several days.  Patient reports migratory pain and swelling, currently in his right wrist greater than his left but his left was worse 2 days ago.  Pain is rated as moderate, nonradiating, increases with movement and is decreased with rest.  Patient denies fever, chills, headache, other current joint pain, weight loss, night sweats, history of rheumatoid.  Patient describes the pain as achy/throbbing, nonradiating.      ALLERGIES  Allergies   Allergen Reactions   • Apple Swelling     Per patient: swelling of mouth and jittery feeling.  Apple allergy to raw apples; apple juice and applesauce okay per patient   • Asa [Aspirin]      \"funny taste in my mouth. My stomach has an 'empty' feeling.\"  \"throat closing\"   • Metformin      Pt states he \"cramps up\"       CURRENT MEDICATIONS  Home Medications     Reviewed by Judit Hamlin (Pharmacy Tech) on 06/14/18 at 1132  Med List Status: Complete   Medication Last Dose Status   albuterol 108 (90 Base) MCG/ACT Aero Soln inhalation aerosol 6/14/2018 Active   amlodipine (NORVASC) 10 MG Tab 6/13/2018 Active   atorvastatin (LIPITOR) 20 MG Tab 6/13/2018 Active   budesonide-formoterol (SYMBICORT) 160-4.5 MCG/ACT Aerosol 6/13/2018 Active   clopidogrel (PLAVIX) 75 MG Tab 6/13/2018 Active   cyclobenzaprine (FLEXERIL) 10 MG Tab 6/13/2018 Active   hydrOXYzine HCl (ATARAX) 25 MG Tab 6/13/2018 Active   ibuprofen (MOTRIN) 200 MG Tab 6/13/2018 Active   Insulin Degludec (TRESIBA FLEXTOUCH) 200 UNIT/ML Solution Pen-injector 6/13/2018 Active   JARDIANCE 25 MG Tab 6/13/2018 Active   lisinopril (PRINIVIL, ZESTRIL) 40 MG tablet 6/13/2018 Active   nicotine (NICODERM) " "14 MG/24HR PATCH 24 HR > 2 days Active   pioglitazone (ACTOS) 30 MG Tab 6/13/2018 Active   raNITidine (ZANTAC) 150 MG Tab 6/13/2018 Active   tiotropium (SPIRIVA) 18 MCG Cap 6/13/2018 Active   VICTOZA 18 MG/3ML Solution Pen-injector injection 6/13/2018 Active                PAST MEDICAL HISTORY   has a past medical history of Arthritis; Asthma (05/08/2018); Chronic LBP; Chronic shoulder pain; COPD (chronic obstructive pulmonary disease) ([facility]) (3/31/10); Dental disorder; DM (diabetes mellitus) ([facility]); Heart burn; HTN (hypertension); Kidney disease; and Pain (05/08/2018).    SURGICAL HISTORY   has a past surgical history that includes other orthopedic surgery (knee).    SOCIAL HISTORY  Social History     Social History Main Topics   • Smoking status: Current Every Day Smoker     Packs/day: 0.50     Years: 25.00     Types: Cigarettes   • Smokeless tobacco: Never Used   • Alcohol use 0.0 - 0.6 oz/week      Comment: Pint/week   • Drug use: No   • Sexual activity: Not on file       Family Hx:  No history of rheumatoid arthritis      REVIEW OF SYSTEMS  See HPI for further details.  All other systems are negative except as above in HPI.      PHYSICAL EXAM  VITAL SIGNS: /105   Pulse 95   Temp 36.6 °C (97.8 °F)   Resp 18   Ht 1.956 m (6' 5\")   Wt 109.1 kg (240 lb 8.4 oz)   SpO2 98%   BMI 28.52 kg/m²     General:  WDWN, nontoxic appearing in NAD; A+Ox3; V/S as above; afebrile, hypertensive  Skin: warm and dry; good color; no rash  HEENT: NCAT; EOMs intact; PERRL; no scleral icterus   Neck: FROM; no meningismus, no LAD  Cardiovascular: Regular heart rate and rhythm.  No murmurs, rubs, or gallops; pulses 2+ bilaterally radially   Lungs: Clear to auscultation with good air movement bilaterally.  No wheezes, rhonchi, or rales.   Extremities: MALIK x 4; obvious minimal to moderate edema over the radial aspect of the right wrist; no warmth or erythema; no crepitus; decreased range of motion at the wrist secondary to pain; " no pedal edema; neg Tea's  Neurologic: CNs III-XII grossly intact; speech clear; distal sensation intact; strength 5/5 UE/LEs  Psychiatric: Appropriate affect, normal mood    LABS  Results for orders placed or performed during the hospital encounter of 06/14/18   RHEUMATOID ARTHRITIS FACTOR   Result Value Ref Range    Rheumatoid Factor -Neph- <10 0 - 14 IU/mL   URIC ACID   Result Value Ref Range    Uric Acid 8.1 2.5 - 8.3 mg/dL   ANTI-NUCLEAR ANTIBODY SERUM   Result Value Ref Range    Antinuclear Antibody None Detected None Detected         MEDICAL RECORD  I have reviewed patient's medical record and pertinent results are listed below.      COURSE & MEDICAL DECISION MAKING  I have reviewed any medical record information, laboratory studies and radiographic results as noted.    Wagner Kenney is a 53 y.o. male who presents complaining of bilateral wrist pain and neck stiffness.  I suspect an arthritic process.  Gout is not generally bilateral although he only has obvious swelling in his right wrist.  I do not suspect meningitis.  I do not suspect a septic joint.  There has been no trauma so imaging is not indicated.  Patient states he has used Indocin in the past with relief of gouty arthritis.  I will draw a uric acid and a rheumatoid factor and discharge the patient home with Indocin and pain medication.  He should follow-up with Dr. Lara on Monday.  Patient was given return precautions which include fever, increased pain and swelling, or other concerns.      Pt's blood pressure was noted to be above 120/80 here in the ER.  Pt was informed and advised to follow up as an outpatient for recheck for possible dx/management of hypertension.    I have reviewed the patient's narcotic Rx record.  No prior prescriptions were detected.      FINAL IMPRESSION  1. Arthralgia of right wrist    2. Arthralgia of left wrist             Electronically signed by: Daiana Lazcano, 6/14/2018 11:33 AM

## 2018-06-14 NOTE — ED NOTES
Wagner Cornel Kenney 53 y.o. male   Ambulatory to triage.    Chief Complaint   Patient presents with   • Joint Pain     Bilateral wrists.  Started in L fingers 2 days ago.   Hx gout   • Neck Pain     Stiffness        Pt returned to lobby and educated on triage process.  Advised to notify RN with changes or concerns.

## 2018-06-15 ENCOUNTER — PATIENT OUTREACH (OUTPATIENT)
Dept: HEALTH INFORMATION MANAGEMENT | Facility: OTHER | Age: 53
End: 2018-06-15

## 2018-06-15 LAB — NUCLEAR IGG SER QL IA: NORMAL

## 2018-06-21 ENCOUNTER — OFFICE VISIT (OUTPATIENT)
Dept: MEDICAL GROUP | Facility: MEDICAL CENTER | Age: 53
End: 2018-06-21
Attending: FAMILY MEDICINE
Payer: MEDICAID

## 2018-06-21 VITALS
HEIGHT: 77 IN | TEMPERATURE: 98 F | WEIGHT: 240 LBS | OXYGEN SATURATION: 100 % | DIASTOLIC BLOOD PRESSURE: 100 MMHG | HEART RATE: 72 BPM | SYSTOLIC BLOOD PRESSURE: 140 MMHG | RESPIRATION RATE: 14 BRPM | BODY MASS INDEX: 28.34 KG/M2

## 2018-06-21 DIAGNOSIS — M10.00 ACUTE IDIOPATHIC GOUT, UNSPECIFIED SITE: ICD-10-CM

## 2018-06-21 DIAGNOSIS — J06.9 UPPER RESPIRATORY TRACT INFECTION, UNSPECIFIED TYPE: ICD-10-CM

## 2018-06-21 DIAGNOSIS — Z79.4 DIABETES MELLITUS DUE TO UNDERLYING CONDITION WITH DIABETIC NEPHROPATHY, WITH LONG-TERM CURRENT USE OF INSULIN (HCC): ICD-10-CM

## 2018-06-21 DIAGNOSIS — E08.21 DIABETES MELLITUS DUE TO UNDERLYING CONDITION WITH DIABETIC NEPHROPATHY, WITH LONG-TERM CURRENT USE OF INSULIN (HCC): ICD-10-CM

## 2018-06-21 PROCEDURE — 99214 OFFICE O/P EST MOD 30 MIN: CPT | Performed by: FAMILY MEDICINE

## 2018-06-21 PROCEDURE — 99213 OFFICE O/P EST LOW 20 MIN: CPT | Performed by: FAMILY MEDICINE

## 2018-06-21 RX ORDER — AZITHROMYCIN 250 MG/1
TABLET, FILM COATED ORAL
Qty: 6 TAB | Refills: 0 | Status: SHIPPED | OUTPATIENT
Start: 2018-06-21 | End: 2018-09-05

## 2018-06-21 RX ORDER — ALLOPURINOL 100 MG/1
100 TABLET ORAL DAILY
Qty: 30 TAB | Refills: 3 | Status: SHIPPED | OUTPATIENT
Start: 2018-06-21

## 2018-06-21 RX ORDER — BUDESONIDE AND FORMOTEROL FUMARATE DIHYDRATE 160; 4.5 UG/1; UG/1
2 AEROSOL RESPIRATORY (INHALATION)
Qty: 1 INHALER | Refills: 3 | Status: SHIPPED | OUTPATIENT
Start: 2018-06-21 | End: 2018-09-28 | Stop reason: SDUPTHER

## 2018-06-21 RX ORDER — METHYLPREDNISOLONE 4 MG/1
4 TABLET ORAL DAILY
Qty: 30 TAB | Refills: 0 | Status: SHIPPED | OUTPATIENT
Start: 2018-06-21 | End: 2018-09-09

## 2018-06-21 ASSESSMENT — ENCOUNTER SYMPTOMS
DIARRHEA: 0
PAIN WHILE RESTING: 0
NECK PAIN: 0
VOMITING: 0
HEADACHES: 0
PAIN AT NIGHT: 0
STIFFNESS: 1
PALPITATIONS: 0
WEIGHT LOSS: 0
SPUTUM PRODUCTION: 0
JOINT WARMTH: 1
WHEEZING: 1
SORE THROAT: 1
TINGLING: 1
FATIGUE: 0
ABDOMINAL PAIN: 0
SWOLLEN GLANDS: 0
COUGH: 0
RHINORRHEA: 1
CHILLS: 0
BACK PAIN: 0
JOINT SWELLING: 1
FEVER: 0
SINUS PAIN: 1
SHORTNESS OF BREATH: 0
NAUSEA: 0

## 2018-06-21 NOTE — PROGRESS NOTES
Subjective:      Wagner Kenney is a 53 y.o. male who presents with Gout            Arthritis   Presents for initial visit. The disease course has been improving. The condition has lasted for 1 week. He complains of pain, stiffness, joint swelling and joint warmth. Affected locations include the right wrist and right MCP. His pain is at a severity of 8/10. Pertinent negatives include no diarrhea, dry eyes, dry mouth, dysuria, fatigue, fever, pain at night, pain while resting, rash, Raynaud's syndrome, uveitis or weight loss. His past medical history is significant for osteoarthritis. There is no history of chronic back pain, psoriasis or rheumatoid arthritis.   His family medical history includes family history of osteoarthritis. Past treatments include an opioid and NSAIDs (he had been seen in er and started on meds, uric acid 8.1 other tests negative, will add allopurinol at 100 mg qd, will stop indocin and start medrol. will recheck uric acid and BMP. will continue to follow.). Factors aggravating his arthritis include gripping and lifting.   URI    This is a recurrent problem. The current episode started in the past 7 days. The problem has been unchanged. There has been no fever. Associated symptoms include congestion, ear pain, joint pain, rhinorrhea, sinus pain, a sore throat and wheezing. Pertinent negatives include no abdominal pain, chest pain, coughing, diarrhea, dysuria, headaches, joint swelling, nausea, neck pain, plugged ear sensation, rash, sneezing, swollen glands or vomiting. He has tried inhaler use (will have pt continue to use his inhalers as directed and will have him hold onto an inhaler if his sxs continue to worsen or persist. ER precautions given to pt. ) for the symptoms.       Review of Systems   Constitutional: Negative for chills, fatigue, fever and weight loss.   HENT: Positive for congestion, ear pain, rhinorrhea, sinus pain and sore throat. Negative for hearing loss, sneezing  "and tinnitus.    Respiratory: Positive for wheezing. Negative for cough, sputum production and shortness of breath.    Cardiovascular: Negative for chest pain and palpitations.   Gastrointestinal: Negative for abdominal pain, diarrhea, nausea and vomiting.   Genitourinary: Negative for dysuria.   Musculoskeletal: Positive for arthritis, joint pain, joint swelling and stiffness. Negative for back pain and neck pain.   Skin: Negative for rash.   Neurological: Positive for tingling. Negative for headaches.          Objective:     /100   Pulse 72   Temp 36.7 °C (98 °F)   Resp 14   Ht 1.956 m (6' 5\")   Wt 108.9 kg (240 lb)   SpO2 100%   BMI 28.46 kg/m²      Physical Exam   Constitutional: He is oriented to person, place, and time. He appears well-developed and well-nourished.   HENT:   Head: Normocephalic and atraumatic.   Congestion and pharyngeal erythema   Cardiovascular: Normal rate, regular rhythm and normal heart sounds.  Exam reveals no friction rub.    No murmur heard.  Pulmonary/Chest: Effort normal. No respiratory distress. He has no wheezes. He has no rales.   Slight coarse breath sounds    Abdominal: Soft. Bowel sounds are normal. He exhibits no distension. There is no tenderness.   Musculoskeletal: He exhibits tenderness. He exhibits no edema.   R hand improving per pt   Neurological: He is alert and oriented to person, place, and time.   Skin: Skin is warm and dry.   Psychiatric: He has a normal mood and affect. His behavior is normal.   Nursing note and vitals reviewed.              Assessment/Plan:     1. Acute idiopathic gout, unspecified site  Will have him discontinue his Indocin and start Medrol if he needs it for his joint swelling. Will recheck his uric acid levels and basic metabolic panel in about 6 weeks after he starts his allopurinol at 100 mg daily. If he continues to have flareups will refer to rheumatology for additional assistance in management.  - BASIC METABOLIC PANEL; " Future  - URIC ACID; Future    2. Diabetes 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, with long-term current use of insulin (HCC)  Lateral continue to use his medications as previously directed. We'll recheck an A1c and microalbumin creatinine ratio. We'll also have him check his extremities on a daily basis for any further changes in sensation or skin breakdown. We'll continue to follow.  - HEMOGLOBIN A1C; Future  - MICROALBUMIN CREAT RATIO URINE; Future    3. Upper respiratory tract infection, unspecified type  Will have him continue to use his inhalers as directed. If his condition continues to persist or worsen we'll have him start a Z-Valente in approximately 7-10 days. ER precautions also given to patient.

## 2018-06-28 ENCOUNTER — OFFICE VISIT (OUTPATIENT)
Dept: CARDIOLOGY | Facility: MEDICAL CENTER | Age: 53
End: 2018-06-28
Payer: MEDICAID

## 2018-06-28 ENCOUNTER — TELEPHONE (OUTPATIENT)
Dept: CARDIOLOGY | Facility: MEDICAL CENTER | Age: 53
End: 2018-06-28

## 2018-06-28 VITALS
HEART RATE: 94 BPM | DIASTOLIC BLOOD PRESSURE: 98 MMHG | BODY MASS INDEX: 28.1 KG/M2 | SYSTOLIC BLOOD PRESSURE: 148 MMHG | RESPIRATION RATE: 14 BRPM | WEIGHT: 238 LBS | HEIGHT: 77 IN | OXYGEN SATURATION: 97 %

## 2018-06-28 DIAGNOSIS — I25.10 CORONARY ARTERY DISEASE INVOLVING NATIVE CORONARY ARTERY OF NATIVE HEART WITHOUT ANGINA PECTORIS: ICD-10-CM

## 2018-06-28 PROCEDURE — 99213 OFFICE O/P EST LOW 20 MIN: CPT | Performed by: INTERNAL MEDICINE

## 2018-06-28 NOTE — PROGRESS NOTES
Chief Complaint   Patient presents with   • Abnormal EKG       Subjective:   Wagner Kenney is a 53 y.o. male who presents today in follow-up of his heart catheterization on 5/9/2018.  Patient was noted to have total occlusion of the small circumflex, moderate focal nonobstructive LAD disease and a 60% proximal stenosis of the right coronary artery.  His ejection fraction was 59%.  Medical therapy was recommended after fractional flow reserves were determined.  The patient feels better from a general standpoint and has been much better about his regular exercise.  He feels well doing his exercise which includes resistive training and some aerobic conditioning.  He understands that risk factor modification is to correct form of treatment at this point.  He has no angina, exertional breathlessness, orthopnea, PND, or pedal edema.  He is watching his diet fairly closely.    Past Medical History:   Diagnosis Date   • Arthritis    • Asthma 05/08/2018    Inhaler use daily.   • Chronic LBP    • Chronic shoulder pain    • COPD (chronic obstructive pulmonary disease) ([facility]) 3/31/10    mild   • Dental disorder    • DM (diabetes mellitus) ([facility])     TYPE II,diet controlled   • Heart burn    • HTN (hypertension)    • Kidney disease    • Pain 05/08/2018    Shoulders, legs and back.     Past Surgical History:   Procedure Laterality Date   • OTHER ORTHOPEDIC SURGERY  knee     Family History   Problem Relation Age of Onset   • Hypertension Mother    • Diabetes Father    • Hypertension Father    • Diabetes Sister      Social History     Social History   • Marital status: Single     Spouse name: N/A   • Number of children: N/A   • Years of education: N/A     Occupational History   • Not on file.     Social History Main Topics   • Smoking status: Current Every Day Smoker     Packs/day: 0.50     Years: 25.00     Types: Cigarettes   • Smokeless tobacco: Never Used      Comment: 6/28 Still smoking and working on smoking cessation.  "  • Alcohol use 0.0 - 0.6 oz/week      Comment: Pint/week   • Drug use: No   • Sexual activity: Not on file     Other Topics Concern   •  Service No   • Blood Transfusions No   • Caffeine Concern No   • Occupational Exposure No   • Hobby Hazards No   • Sleep Concern No     hard to sleep   • Stress Concern No   • Weight Concern No   • Special Diet Yes     diabetic    • Back Care Yes   • Exercise Yes     little bit   • Bike Helmet No     does not ride bike    • Seat Belt Yes   • Self-Exams No     Social History Narrative   • No narrative on file     Allergies   Allergen Reactions   • Apple Swelling     Per patient: swelling of mouth and jittery feeling.  Apple allergy to raw apples; apple juice and applesauce okay per patient   • Asa [Aspirin]      \"funny taste in my mouth. My stomach has an 'empty' feeling.\"  \"throat closing\"   • Metformin Diarrhea     Pt states he \"cramps up\"     Outpatient Encounter Prescriptions as of 6/28/2018   Medication Sig Dispense Refill   • allopurinol (ZYLOPRIM) 100 MG Tab Take 1 Tab by mouth every day. 30 Tab 3   • budesonide-formoterol (SYMBICORT) 160-4.5 MCG/ACT Aerosol Inhale 2 Puffs by mouth 1 time daily as needed (For SOB). 1 Inhaler 3   • tiotropium (SPIRIVA) 18 MCG Cap Inhale 18 mcg by mouth every day.     • hydrOXYzine HCl (ATARAX) 25 MG Tab TAKE 1 TABLET ORALLY 3 TIMES DAILY IF NEEDED FOR ITCHING 30 Tab 0   • lisinopril (PRINIVIL, ZESTRIL) 40 MG tablet TAKE 1 TABLET ORALLY ONCE DAILY 90 Tab 2   • clopidogrel (PLAVIX) 75 MG Tab Take 1 Tab by mouth every day. 30 Tab 11   • raNITidine (ZANTAC) 150 MG Tab TAKE 1 TABLET ORALLY 2 TIMES DAILY IF NEEDED FOR HEARTBURN 60 Tab 2   • Insulin Degludec (TRESIBA FLEXTOUCH) 200 UNIT/ML Solution Pen-injector Inject 50 Units as instructed every bedtime. 3 PEN 2   • JARDIANCE 25 MG Tab Take 1 tablet by mouth every day. 30 Tab 3   • VICTOZA 18 MG/3ML Solution Pen-injector injection Inject 0.3 mL as instructed every day. 3 PEN 6   • " "pioglitazone (ACTOS) 30 MG Tab Take 1 Tab by mouth every day. 30 Tab 11   • cyclobenzaprine (FLEXERIL) 10 MG Tab Take 10 mg by mouth every day.     • nicotine (NICODERM) 14 MG/24HR PATCH 24 HR Apply 1 Patch to skin as directed every 24 hours. 30 Patch 3   • albuterol 108 (90 Base) MCG/ACT Aero Soln inhalation aerosol Inhale 2 Puffs by mouth every 6 hours as needed for Shortness of Breath. 8.5 g 6   • atorvastatin (LIPITOR) 20 MG Tab Take 1 Tab by mouth every day. 30 Tab 3   • amlodipine (NORVASC) 10 MG Tab Take 1 Tab by mouth every day. 90 Tab 1   • methylPREDNISolone (MEDROL) 4 MG Tab Take 1 Tab by mouth every day. (Patient not taking: Reported on 6/28/2018) 30 Tab 0   • azithromycin (ZITHROMAX) 250 MG Tab Use as directed (Patient not taking: Reported on 6/28/2018) 6 Tab 0     No facility-administered encounter medications on file as of 6/28/2018.      ROS.  See HPI     Objective:   /98   Pulse 94   Resp 14   Ht 1.956 m (6' 5\")   Wt 108 kg (238 lb)   SpO2 97%   BMI 28.22 kg/m²     Physical Exam General: WD, WN, -American male in NAD.   Neck:  Good carotid upstroke and no bruit  Chest: Clear to A & P  Heart: Regular rhythm, Normal S1 and S2. No murmur, gallop, rub, click  Ext: No edema  Neuro: Alert, oriented  Psych: normal mood, affect    Assessment:     1. Coronary artery disease involving native coronary artery of native heart without angina pectoris         Medical Decision Making:  Today's Assessment / Status / Plan:   The angiographic results were evaluated with the patient.  He understands that he has coronary artery disease that is noncritical at this point.  Risk factor modification including addressing hyperlipidemia, hypertension, diabetes are important.  He is well motivated to adjust his life accordingly.  I explained that he will be able to live a normal life as long as his coronary lesions do not get any worse.  He understands that his symptoms of worsening coronary disease may " present as decreased exercise tolerance and will pay attention to this type of symptom.  He will follow-up with Dr. Lara on a regular basis and will follow up with me in 6 months or sooner if symptoms arise.

## 2018-07-12 DIAGNOSIS — E78.5 HYPERLIPIDEMIA WITH TARGET LDL LESS THAN 100: ICD-10-CM

## 2018-07-16 RX ORDER — RANITIDINE 150 MG/1
TABLET ORAL
Qty: 60 TAB | Refills: 1 | Status: SHIPPED | OUTPATIENT
Start: 2018-07-16 | End: 2018-09-10 | Stop reason: SDUPTHER

## 2018-07-16 RX ORDER — CYCLOBENZAPRINE HCL 10 MG
TABLET ORAL
Qty: 60 TAB | Refills: 1 | Status: SHIPPED | OUTPATIENT
Start: 2018-07-16 | End: 2018-09-28 | Stop reason: SDUPTHER

## 2018-07-16 RX ORDER — HYDROXYZINE HYDROCHLORIDE 25 MG/1
TABLET, FILM COATED ORAL
Qty: 30 TAB | Refills: 0 | Status: SHIPPED | OUTPATIENT
Start: 2018-07-16 | End: 2018-08-29 | Stop reason: SDUPTHER

## 2018-07-16 RX ORDER — ATORVASTATIN CALCIUM 20 MG/1
TABLET, FILM COATED ORAL
Qty: 30 TAB | Refills: 3 | Status: SHIPPED | OUTPATIENT
Start: 2018-07-16 | End: 2018-12-28 | Stop reason: SDUPTHER

## 2018-08-09 ENCOUNTER — OFFICE VISIT (OUTPATIENT)
Dept: MEDICAL GROUP | Facility: MEDICAL CENTER | Age: 53
End: 2018-08-09
Attending: FAMILY MEDICINE
Payer: MEDICAID

## 2018-08-09 VITALS
TEMPERATURE: 98.3 F | SYSTOLIC BLOOD PRESSURE: 138 MMHG | OXYGEN SATURATION: 98 % | RESPIRATION RATE: 16 BRPM | HEIGHT: 77 IN | HEART RATE: 81 BPM | DIASTOLIC BLOOD PRESSURE: 86 MMHG | BODY MASS INDEX: 28.57 KG/M2 | WEIGHT: 242 LBS

## 2018-08-09 DIAGNOSIS — Z79.4 DIABETES MELLITUS DUE TO UNDERLYING CONDITION WITH DIABETIC NEPHROPATHY, WITH LONG-TERM CURRENT USE OF INSULIN (HCC): ICD-10-CM

## 2018-08-09 DIAGNOSIS — Z20.2 ENCOUNTER FOR ASSESSMENT OF STD EXPOSURE: ICD-10-CM

## 2018-08-09 DIAGNOSIS — E08.21 DIABETES MELLITUS DUE TO UNDERLYING CONDITION WITH DIABETIC NEPHROPATHY, WITH LONG-TERM CURRENT USE OF INSULIN (HCC): ICD-10-CM

## 2018-08-09 DIAGNOSIS — I10 ESSENTIAL HYPERTENSION: ICD-10-CM

## 2018-08-09 DIAGNOSIS — J43.9 PULMONARY EMPHYSEMA, UNSPECIFIED EMPHYSEMA TYPE (HCC): ICD-10-CM

## 2018-08-09 LAB
HBA1C MFR BLD: 7.8 % (ref ?–5.8)
INT CON NEG: NORMAL
INT CON POS: NORMAL

## 2018-08-09 PROCEDURE — 99214 OFFICE O/P EST MOD 30 MIN: CPT

## 2018-08-09 PROCEDURE — 83036 HEMOGLOBIN GLYCOSYLATED A1C: CPT

## 2018-08-09 RX ORDER — ALBUTEROL SULFATE 90 UG/1
2 AEROSOL, METERED RESPIRATORY (INHALATION) EVERY 6 HOURS PRN
Qty: 3 INHALER | Refills: 6 | Status: SHIPPED | OUTPATIENT
Start: 2018-08-09 | End: 2018-10-03

## 2018-08-09 ASSESSMENT — ENCOUNTER SYMPTOMS
VOMITING: 0
FEVER: 0
CHILLS: 0
NAUSEA: 0
PALPITATIONS: 0
COUGH: 0
ABDOMINAL PAIN: 0
SPUTUM PRODUCTION: 0

## 2018-08-09 NOTE — PROGRESS NOTES
"Subjective:      Wagner Kenney is a 53 y.o. male who presents with Follow-Up (Labs.  Did not have more recent labs completed)            Patient 53-year-old male here for follow-up of his hypertension, diabetes, COPD and concern for exposure to STD.    Patient states that he is currently in a new relationship and would like to have STD blood work done today. He is not having any symptoms and does not think he has been exposed but is just wanting to get the blood work done. A blood work for HIV, hep B, and RPR will be ordered today.    He will also need a refill of Ventolin inhaler made today. He states that he has been using his inhaler more frequently due to the smoke in the air. He has been using his maintenance inhalers as directed. He is not having any fever, no productive cough and no other systemic symptoms. We'll continue to follow.    His blood pressure today is within normal limits. He has been taking his blood pressure medications as directed. He is not having any chest pain or shortness of breath. We'll continue to check his blood pressures at home and keep notes.    He was unable to get his regular blood work done. But will order a point of care hemoglobin A1c today. He has been taking his diabetes medications as directed and has not been having any issues with that. We'll continue to follow.     Current medications, allergies, and problem list reviewed with patient and updated in EPIC.          Review of Systems   Constitutional: Negative for chills and fever.   HENT: Negative for hearing loss and tinnitus.    Respiratory: Negative for cough and sputum production.    Cardiovascular: Negative for chest pain and palpitations.   Gastrointestinal: Negative for abdominal pain, nausea and vomiting.   Skin: Negative for rash.          Objective:     /86   Pulse 81   Temp 36.8 °C (98.3 °F)   Resp 16   Ht 1.956 m (6' 5\")   Wt 109.8 kg (242 lb)   SpO2 98%   BMI 28.70 kg/m²      Physical Exam "   Constitutional: He is oriented to person, place, and time. He appears well-developed and well-nourished.   HENT:   Head: Normocephalic and atraumatic.   Cardiovascular: Normal rate, regular rhythm and normal heart sounds.  Exam reveals no friction rub.    No murmur heard.  Pulmonary/Chest: Effort normal and breath sounds normal. No respiratory distress. He has no wheezes. He has no rales.   Abdominal: Soft. Bowel sounds are normal. He exhibits no distension. There is no tenderness.   Neurological: He is alert and oriented to person, place, and time.   Skin: Skin is warm and dry.   Psychiatric: He has a normal mood and affect. His behavior is normal.   Nursing note and vitals reviewed.              Assessment/Plan:     1. Encounter for assessment of STD exposure  Blood work will be ordered for patient to have the STD check done. We'll continue to follow.  - HIV AG/AB COMBO ASSAY DIAGNOSTIC; Future  - HEPATITIS PANEL ACUTE(4 COMPONENTS); Future  - RPR TITER (KNOWN POSITIVE ONLY) (aka SYPHILIS); Future  - HEP C VIRUS ANTIBODY; Future    2. Pulmonary emphysema, unspecified emphysema type (HCC)  Will refill his inhaler today and have him continue to use his inhalers as previously directed. We'll continue to follow.  - albuterol 108 (90 Base) MCG/ACT Aero Soln inhalation aerosol; Inhale 2 Puffs by mouth every 6 hours as needed for Shortness of Breath.  Dispense: 3 Inhaler; Refill: 6    3. Essential hypertension  Will have him continue to use his medication as directed. He has been advised to monitor blood pressure at home and keep notes. If blood pressure elevated or having symptoms of CP, SOB or neurologic changes to go to the er.    4. Diabetes 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, with long-term current use of insulin (HCC)  Will have him continue to take his medication as directed. Will check a POC hemoglobin A1c. We'll continue to follow.

## 2018-08-21 ENCOUNTER — HOSPITAL ENCOUNTER (OUTPATIENT)
Dept: LAB | Facility: MEDICAL CENTER | Age: 53
End: 2018-08-21
Attending: FAMILY MEDICINE
Payer: MEDICAID

## 2018-08-21 DIAGNOSIS — Z20.2 ENCOUNTER FOR ASSESSMENT OF STD EXPOSURE: ICD-10-CM

## 2018-08-21 LAB
HAV IGM SERPL QL IA: NEGATIVE
HBV CORE IGM SER QL: NEGATIVE
HBV SURFACE AG SER QL: NEGATIVE
HCV AB SER QL: NEGATIVE
HIV 1+2 AB+HIV1 P24 AG SERPL QL IA: NON REACTIVE
TREPONEMA PALLIDUM IGG+IGM AB [PRESENCE] IN SERUM OR PLASMA BY IMMUNOASSAY: NON REACTIVE

## 2018-08-21 PROCEDURE — 36415 COLL VENOUS BLD VENIPUNCTURE: CPT

## 2018-08-21 PROCEDURE — 87389 HIV-1 AG W/HIV-1&-2 AB AG IA: CPT

## 2018-08-21 PROCEDURE — 86780 TREPONEMA PALLIDUM: CPT

## 2018-08-21 PROCEDURE — 80074 ACUTE HEPATITIS PANEL: CPT

## 2018-08-22 ENCOUNTER — HOSPITAL ENCOUNTER (OUTPATIENT)
Dept: LAB | Facility: MEDICAL CENTER | Age: 53
End: 2018-08-22
Attending: FAMILY MEDICINE
Payer: MEDICAID

## 2018-08-22 DIAGNOSIS — E08.21 DIABETES MELLITUS DUE TO UNDERLYING CONDITION WITH DIABETIC NEPHROPATHY, WITH LONG-TERM CURRENT USE OF INSULIN (HCC): ICD-10-CM

## 2018-08-22 DIAGNOSIS — Z79.4 DIABETES MELLITUS DUE TO UNDERLYING CONDITION WITH DIABETIC NEPHROPATHY, WITH LONG-TERM CURRENT USE OF INSULIN (HCC): ICD-10-CM

## 2018-08-22 DIAGNOSIS — M10.00 ACUTE IDIOPATHIC GOUT, UNSPECIFIED SITE: ICD-10-CM

## 2018-08-22 LAB
ANION GAP SERPL CALC-SCNC: 11 MMOL/L (ref 0–11.9)
BUN SERPL-MCNC: 17 MG/DL (ref 8–22)
CALCIUM SERPL-MCNC: 9 MG/DL (ref 8.5–10.5)
CHLORIDE SERPL-SCNC: 104 MMOL/L (ref 96–112)
CO2 SERPL-SCNC: 26 MMOL/L (ref 20–33)
CREAT SERPL-MCNC: 1.08 MG/DL (ref 0.5–1.4)
CREAT UR-MCNC: 286.2 MG/DL
EST. AVERAGE GLUCOSE BLD GHB EST-MCNC: 203 MG/DL
GLUCOSE SERPL-MCNC: 154 MG/DL (ref 65–99)
HBA1C MFR BLD: 8.7 % (ref 0–5.6)
MICROALBUMIN UR-MCNC: 536.1 MG/DL
MICROALBUMIN/CREAT UR: 1873 MG/G (ref 0–30)
POTASSIUM SERPL-SCNC: 3.8 MMOL/L (ref 3.6–5.5)
SODIUM SERPL-SCNC: 141 MMOL/L (ref 135–145)
URATE SERPL-MCNC: 7.7 MG/DL (ref 2.5–8.3)

## 2018-08-22 PROCEDURE — 84550 ASSAY OF BLOOD/URIC ACID: CPT

## 2018-08-22 PROCEDURE — 82570 ASSAY OF URINE CREATININE: CPT

## 2018-08-22 PROCEDURE — 36415 COLL VENOUS BLD VENIPUNCTURE: CPT

## 2018-08-22 PROCEDURE — 80048 BASIC METABOLIC PNL TOTAL CA: CPT

## 2018-08-22 PROCEDURE — 83036 HEMOGLOBIN GLYCOSYLATED A1C: CPT

## 2018-08-22 PROCEDURE — 82043 UR ALBUMIN QUANTITATIVE: CPT

## 2018-08-28 ENCOUNTER — NON-PROVIDER VISIT (OUTPATIENT)
Dept: OCCUPATIONAL MEDICINE | Facility: CLINIC | Age: 53
End: 2018-08-28

## 2018-08-28 ENCOUNTER — HOSPITAL ENCOUNTER (OUTPATIENT)
Facility: MEDICAL CENTER | Age: 53
End: 2018-08-28
Attending: PREVENTIVE MEDICINE
Payer: COMMERCIAL

## 2018-08-28 DIAGNOSIS — Z11.1 ENCOUNTER FOR PPD TEST: ICD-10-CM

## 2018-08-28 DIAGNOSIS — Z02.1 PRE-EMPLOYMENT DRUG SCREENING: ICD-10-CM

## 2018-08-28 LAB
AMP AMPHETAMINE: NORMAL
COC COCAINE: NORMAL
INT CON NEG: NORMAL
INT CON POS: NORMAL
MET METHAMPHETAMINES: NORMAL
OPI OPIATES: NORMAL
PCP PHENCYCLIDINE: NORMAL
POC DRUG COMMENT 753798-OCCUPATIONAL HEALTH: NORMAL
THC: NORMAL

## 2018-08-28 PROCEDURE — 80305 DRUG TEST PRSMV DIR OPT OBS: CPT | Performed by: PREVENTIVE MEDICINE

## 2018-08-28 PROCEDURE — 86480 TB TEST CELL IMMUN MEASURE: CPT | Performed by: PREVENTIVE MEDICINE

## 2018-08-29 ENCOUNTER — OFFICE VISIT (OUTPATIENT)
Dept: OCCUPATIONAL MEDICINE | Facility: CLINIC | Age: 53
End: 2018-08-29

## 2018-08-29 VITALS
SYSTOLIC BLOOD PRESSURE: 130 MMHG | WEIGHT: 240 LBS | HEART RATE: 94 BPM | DIASTOLIC BLOOD PRESSURE: 92 MMHG | TEMPERATURE: 97.9 F | RESPIRATION RATE: 14 BRPM | HEIGHT: 77 IN | BODY MASS INDEX: 28.34 KG/M2 | OXYGEN SATURATION: 96 %

## 2018-08-29 DIAGNOSIS — Z02.1 ENCOUNTER FOR PRE-EMPLOYMENT EXAMINATION: ICD-10-CM

## 2018-08-29 PROCEDURE — 8915 PR COMPREHENSIVE PHYSICAL: Performed by: PREVENTIVE MEDICINE

## 2018-08-29 RX ORDER — EMPAGLIFLOZIN 25 MG/1
TABLET, FILM COATED ORAL
Qty: 30 TAB | Refills: 2 | Status: SHIPPED | OUTPATIENT
Start: 2018-08-29 | End: 2018-09-28 | Stop reason: SDUPTHER

## 2018-08-29 RX ORDER — HYDROXYZINE HYDROCHLORIDE 25 MG/1
TABLET, FILM COATED ORAL
Qty: 30 TAB | Refills: 2 | Status: SHIPPED | OUTPATIENT
Start: 2018-08-29 | End: 2018-09-09

## 2018-08-29 NOTE — PROGRESS NOTES
Patient's body mass index is 28.46 kg/m². Exercise and nutrition counseling were performed at this visit.

## 2018-08-31 LAB
M TB TUBERC IFN-G BLD QL: NEGATIVE
M TB TUBERC IFN-G/MITOGEN IGNF BLD: -0
M TB TUBERC IGNF/MITOGEN IGNF CONTROL: 49.98 [IU]/ML
MITOGEN IGNF BCKGRD COR BLD-ACNC: 0.01 [IU]/ML

## 2018-09-01 ENCOUNTER — HOSPITAL ENCOUNTER (EMERGENCY)
Facility: MEDICAL CENTER | Age: 53
End: 2018-09-01
Attending: EMERGENCY MEDICINE
Payer: MEDICAID

## 2018-09-01 VITALS
RESPIRATION RATE: 16 BRPM | BODY MASS INDEX: 28.56 KG/M2 | SYSTOLIC BLOOD PRESSURE: 182 MMHG | DIASTOLIC BLOOD PRESSURE: 103 MMHG | TEMPERATURE: 97.8 F | HEART RATE: 84 BPM | HEIGHT: 77 IN | WEIGHT: 241.84 LBS | OXYGEN SATURATION: 98 %

## 2018-09-01 DIAGNOSIS — S61.411A LACERATION OF RIGHT HAND WITHOUT FOREIGN BODY, INITIAL ENCOUNTER: ICD-10-CM

## 2018-09-01 PROCEDURE — 304217 HCHG IRRIGATION SYSTEM

## 2018-09-01 PROCEDURE — 304999 HCHG REPAIR-SIMPLE/INTERMED LEVEL 1

## 2018-09-01 PROCEDURE — A6403 STERILE GAUZE>16 <= 48 SQ IN: HCPCS

## 2018-09-01 PROCEDURE — 303485 HCHG DRESSING MEDIUM

## 2018-09-01 PROCEDURE — 99283 EMERGENCY DEPT VISIT LOW MDM: CPT

## 2018-09-01 PROCEDURE — 303747 HCHG EXTRA SUTURE

## 2018-09-01 NOTE — ED NOTES
Patient was sharpening knife and cut base of rt index finger patient has extension and flexion. Bleeding has stopped.

## 2018-09-01 NOTE — ED NOTES
Finger sutured patient understands discharge instructions. Will return in seven days for suture removal. Dressing applied.

## 2018-09-01 NOTE — ED PROVIDER NOTES
ED Provider Note  CHIEF COMPLAINT  No chief complaint on file.      HPI  Wagner Kenney is a 53 y.o. male who presents for evaluation of a laceration to his right index finger accidentally with a knife.  He is here for evaluation.  He has no numbness or tendon involvement to the finger    REVIEW OF SYSTEMS  See HPI for further details.  Denies any symptoms related to his underlying diabetes, his chronic low back pain    PAST MEDICAL HISTORY  Past Medical History:   Diagnosis Date   • Arthritis    • Asthma 05/08/2018    Inhaler use daily.   • Chronic LBP    • Chronic shoulder pain    • COPD (chronic obstructive pulmonary disease) ([facility]) 3/31/10    mild   • Dental disorder    • DM (diabetes mellitus) ([facility])     TYPE II,diet controlled   • Heart burn    • HTN (hypertension)    • Kidney disease    • Pain 05/08/2018    Shoulders, legs and back.       FAMILY HISTORY  Family History   Problem Relation Age of Onset   • Hypertension Mother    • Diabetes Father    • Hypertension Father    • Diabetes Sister        SOCIAL HISTORY  Social History     Social History   • Marital status: Single     Spouse name: N/A   • Number of children: N/A   • Years of education: N/A     Social History Main Topics   • Smoking status: Current Every Day Smoker     Packs/day: 0.50     Years: 25.00     Types: Cigarettes   • Smokeless tobacco: Never Used      Comment: 6/28 Still smoking and working on smoking cessation.   • Alcohol use 0.0 - 0.6 oz/week      Comment: Pint/week   • Drug use: No   • Sexual activity: Not on file     Other Topics Concern   •  Service No   • Blood Transfusions No   • Caffeine Concern No   • Occupational Exposure No   • Hobby Hazards No   • Sleep Concern No     hard to sleep   • Stress Concern No   • Weight Concern No   • Special Diet Yes     diabetic    • Back Care Yes   • Exercise Yes     little bit   • Bike Helmet No     does not ride bike    • Seat Belt Yes   • Self-Exams No     Social History Narrative   •  "No narrative on file       SURGICAL HISTORY  Past Surgical History:   Procedure Laterality Date   • OTHER ORTHOPEDIC SURGERY  knee       CURRENT MEDICATIONS  Home Medications    **Home medications have not yet been reviewed for this encounter**          ALLERGIES  Allergies   Allergen Reactions   • Apple Swelling     Per patient: swelling of mouth and jittery feeling.  Apple allergy to raw apples; apple juice and applesauce okay per patient   • Asa [Aspirin]      \"funny taste in my mouth. My stomach has an 'empty' feeling.\"  \"throat closing\"   • Metformin Diarrhea     Pt states he \"cramps up\"       PHYSICAL EXAM  VITAL SIGNS: BP (!) 193/122   Pulse 89   Temp 36.6 °C (97.8 °F)   Resp 17   Ht 1.956 m (6' 5\")   Wt 109.7 kg (241 lb 13.5 oz)   SpO2 98%   BMI 28.68 kg/m²   Constitutional :  Well developed, Well nourished, No acute distress, Non-toxic appearance.   HENT: Head is atraumatic normocephalic  Eyes: Normal-appearing  Neck: Normal range of motion, No tenderness, Supple, No stridor.   Right index finger laceration above the MCP joint of the lateral edge of the finger approximately 2 cm in length no tendon involvement is noted it is fairly superficial  Thorax & Lungs: No respiratory distress  Skin: Warm, Dry, No erythema, No rash.               COURSE & MEDICAL DECISION MAKING  Pertinent Labs & Imaging studies reviewed. (See chart for details)  The patient is presenting for evaluation of a laceration to the index finger.  The patient was anesthetized 1% lidocaine irrigated with saline no foreign body noted wound was then closed with simple 5-0 nylon sutures.  4 simple sutures were placed, wound care instructions given tetanus is up-to-date last administered 4 years ago  FINAL IMPRESSION  1.  Laceration of right index finger  2.   3.      Electronically signed by: Dayday Berkowitz, 9/1/2018      "

## 2018-09-01 NOTE — DISCHARGE INSTRUCTIONS
Laceration Care, Adult  A laceration is a cut that goes through all layers of the skin. The cut also goes into the tissue that is right under the skin. Some cuts heal on their own. Others need to be closed with stitches (sutures), staples, skin adhesive strips, or wound glue. Taking care of your cut lowers your risk of infection and helps your cut to heal better.  HOW TO TAKE CARE OF YOUR CUT  For stitches or staples:  · Keep the wound clean and dry.  · If you were given a bandage (dressing), you should change it at least one time per day or as told by your doctor. You should also change it if it gets wet or dirty.  · Keep the wound completely dry for the first 24 hours or as told by your doctor. After that time, you may take a shower or a bath. However, make sure that the wound is not soaked in water until after the stitches or staples have been removed.  · Clean the wound one time each day or as told by your doctor:  ¨ Wash the wound with soap and water.  ¨ Rinse the wound with water until all of the soap comes off.  ¨ Pat the wound dry with a clean towel. Do not rub the wound.  · After you clean the wound, put a thin layer of antibiotic ointment on it as told by your doctor. This ointment:  ¨ Helps to prevent infection.  ¨ Keeps the bandage from sticking to the wound.  · Have your stitches or staples removed as told by your doctor.  If your doctor used skin adhesive strips:   · Keep the wound clean and dry.  · If you were given a bandage, you should change it at least one time per day or as told by your doctor. You should also change it if it gets dirty or wet.  · Do not get the skin adhesive strips wet. You can take a shower or a bath, but be careful to keep the wound dry.  · If the wound gets wet, pat it dry with a clean towel. Do not rub the wound.  · Skin adhesive strips fall off on their own. You can trim the strips as the wound heals. Do not remove any strips that are still stuck to the wound. They will  fall off after a while.  If your doctor used wound glue:  · Try to keep your wound dry, but you may briefly wet it in the shower or bath. Do not soak the wound in water, such as by swimming.  · After you take a shower or a bath, gently pat the wound dry with a clean towel. Do not rub the wound.  · Do not do any activities that will make you really sweaty until the skin glue has fallen off on its own.  · Do not apply liquid, cream, or ointment medicine to your wound while the skin glue is still on.  · If you were given a bandage, you should change it at least one time per day or as told by your doctor. You should also change it if it gets dirty or wet.  · If a bandage is placed over the wound, do not let the tape for the bandage touch the skin glue.  · Do not pick at the glue. The skin glue usually stays on for 5-10 days. Then, it falls off of the skin.  General Instructions   · To help prevent scarring, make sure to cover your wound with sunscreen whenever you are outside after stitches are removed, after adhesive strips are removed, or when wound glue stays in place and the wound is healed. Make sure to wear a sunscreen of at least 30 SPF.  · Take over-the-counter and prescription medicines only as told by your doctor.  · If you were given antibiotic medicine or ointment, take or apply it as told by your doctor. Do not stop using the antibiotic even if your wound is getting better.  · Do not scratch or pick at the wound.  · Keep all follow-up visits as told by your doctor. This is important.  · Check your wound every day for signs of infection. Watch for:  ¨ Redness, swelling, or pain.  ¨ Fluid, blood, or pus.  · Raise (elevate) the injured area above the level of your heart while you are sitting or lying down, if possible.  GET HELP IF:  · You got a tetanus shot and you have any of these problems at the injection site:  ¨ Swelling.  ¨ Very bad pain.  ¨ Redness.  ¨ Bleeding.  · You have a fever.  · A wound that was  closed breaks open.  · You notice a bad smell coming from your wound or your bandage.  · You notice something coming out of the wound, such as wood or glass.  · Medicine does not help your pain.  · You have more redness, swelling, or pain at the site of your wound.  · You have fluid, blood, or pus coming from your wound.  · You notice a change in the color of your skin near your wound.  · You need to change the bandage often because fluid, blood, or pus is coming from the wound.  · You start to have a new rash.  · You start to have numbness around the wound.  GET HELP RIGHT AWAY IF:  · You have very bad swelling around the wound.  · Your pain suddenly gets worse and is very bad.  · You notice painful lumps near the wound or on skin that is anywhere on your body.  · You have a red streak going away from your wound.  · The wound is on your hand or foot and you cannot move a finger or toe like you usually can.  · The wound is on your hand or foot and you notice that your fingers or toes look pale or bluish.     This information is not intended to replace advice given to you by your health care provider. Make sure you discuss any questions you have with your health care provider.     Document Released: 06/05/2009 Document Revised: 05/03/2016 Document Reviewed: 12/14/2015  [employer] Patient Education ©2016 [employer]

## 2018-09-04 ENCOUNTER — NON-PROVIDER VISIT (OUTPATIENT)
Dept: OCCUPATIONAL MEDICINE | Facility: CLINIC | Age: 53
End: 2018-09-04

## 2018-09-04 DIAGNOSIS — Z02.83 ENCOUNTER FOR DRUG SCREENING: ICD-10-CM

## 2018-09-04 PROCEDURE — 8911 PR MRO FEE: Performed by: INTERNAL MEDICINE

## 2018-09-05 ENCOUNTER — OFFICE VISIT (OUTPATIENT)
Dept: MEDICAL GROUP | Facility: MEDICAL CENTER | Age: 53
End: 2018-09-05
Attending: FAMILY MEDICINE
Payer: MEDICAID

## 2018-09-05 VITALS
WEIGHT: 239 LBS | RESPIRATION RATE: 14 BRPM | OXYGEN SATURATION: 99 % | BODY MASS INDEX: 28.22 KG/M2 | HEART RATE: 96 BPM | SYSTOLIC BLOOD PRESSURE: 120 MMHG | HEIGHT: 77 IN | TEMPERATURE: 96.5 F | DIASTOLIC BLOOD PRESSURE: 90 MMHG

## 2018-09-05 DIAGNOSIS — S69.91XA HAND INJURY, RIGHT, INITIAL ENCOUNTER: ICD-10-CM

## 2018-09-05 DIAGNOSIS — E08.21 DIABETES MELLITUS DUE TO UNDERLYING CONDITION WITH DIABETIC NEPHROPATHY, WITH LONG-TERM CURRENT USE OF INSULIN (HCC): ICD-10-CM

## 2018-09-05 DIAGNOSIS — Z79.4 DIABETES MELLITUS DUE TO UNDERLYING CONDITION WITH DIABETIC NEPHROPATHY, WITH LONG-TERM CURRENT USE OF INSULIN (HCC): ICD-10-CM

## 2018-09-05 DIAGNOSIS — E11.40 TYPE 2 DIABETES MELLITUS WITH DIABETIC NEUROPATHY, WITHOUT LONG-TERM CURRENT USE OF INSULIN (HCC): ICD-10-CM

## 2018-09-05 PROCEDURE — 99213 OFFICE O/P EST LOW 20 MIN: CPT | Performed by: FAMILY MEDICINE

## 2018-09-05 PROCEDURE — 99214 OFFICE O/P EST MOD 30 MIN: CPT | Performed by: FAMILY MEDICINE

## 2018-09-05 RX ORDER — HYDROCODONE BITARTRATE AND ACETAMINOPHEN 5; 325 MG/1; MG/1
1-2 TABLET ORAL EVERY 8 HOURS PRN
Qty: 40 TAB | Refills: 0 | Status: SHIPPED | OUTPATIENT
Start: 2018-09-05 | End: 2018-09-20

## 2018-09-05 ASSESSMENT — ENCOUNTER SYMPTOMS
ABDOMINAL PAIN: 0
PALPITATIONS: 0
COUGH: 0
NAUSEA: 0
SPUTUM PRODUCTION: 0
FEVER: 0
CHILLS: 0
BACK PAIN: 1
TINGLING: 1
SHORTNESS OF BREATH: 0
VOMITING: 0

## 2018-09-05 NOTE — PROGRESS NOTES
Subjective:      Wagner Kenney is a 53 y.o. male who presents with Results (labs)            Acute pain   This is a new problem.   Patient is complaining of pain x 3 day(s) located in his R hand 2nd finger. He had been sharpening his knife to start cooking and accidentally cut his second finger with a meat hillary. He seen in the emergency room where stitches were used to repair his wound. He is following up today from the emergency room. His wound appears clean and dry without any signs of infection.  Pain is intermittent, described as sharp and a 8/10 on the pain scale.   Treatments tried include:Tylenol    I have assessed the patient's risk for abuse, dependency, and addiction using the validated Opioid Risk Tool.    Opioid Risk Score: 1    Interpretation of Opioid Risk Score   Score 0-3 = Low risk of abuse. Do UDS at least once per year.  Score 4-7 = Moderate risk of abuse. Do UDS 1-4 times per year.  Score 8+ = High risk of abuse. Refer to specialist.    I have conducted a physical exam and documented findings.  I certify that I have obtained and reviewed his medical history. I have also made a good osvaldo effort to obtain applicable records from other providers who have treated the patient.  I have reviewed the patient's prescription history as maintained by the [place] Prescription Monitoring Program.     Given the above, I believe the benefits of controlled substance therapy outweigh the risks. The reasons for prescribing controlled substances include non-narcotic, oral analgesic alternatives have been inadequate for pain control. Accordingly, I have discussed the risk and benefits, treatment plan, and alternative therapies with the patient. Patient was advised that this medicine is intended for short term (no more than 14 days)/intermittent use only and not intended to be an ongoing prescription.    Also reviewed the results of his recent blood work which showed significant proteinuria, so we will have  "him continue to use his lisinopril as directed but a referral to nephrology has also been made. We'll have him continue to use his diabetic medications as previously directed. His hemoglobin A1c was 8.7. We'll continue to follow.      Current medications, allergies, and problem list reviewed with patient and updated in EPIC.          Review of Systems   Constitutional: Negative for chills and fever.   HENT: Negative for hearing loss and tinnitus.    Respiratory: Negative for cough, sputum production and shortness of breath.    Cardiovascular: Negative for chest pain and palpitations.   Gastrointestinal: Negative for abdominal pain, nausea and vomiting.   Musculoskeletal: Positive for back pain and joint pain.   Neurological: Positive for tingling.          Objective:     /90   Pulse 96   Temp 35.8 °C (96.5 °F)   Resp 14   Ht 1.956 m (6' 5\")   Wt 108.4 kg (239 lb)   SpO2 99%   BMI 28.34 kg/m²      Physical Exam   Constitutional: He is oriented to person, place, and time. He appears well-developed and well-nourished.   HENT:   Head: Normocephalic and atraumatic.   Cardiovascular: Normal rate, regular rhythm and normal heart sounds.  Exam reveals no friction rub.    No murmur heard.  Pulmonary/Chest: Effort normal and breath sounds normal. No respiratory distress. He has no wheezes. He has no rales.   Abdominal: Soft. Bowel sounds are normal. He exhibits no distension. There is no tenderness.   Musculoskeletal:   Pain with movement of affected finger, sutures intact without sign of infection   Neurological: He is alert and oriented to person, place, and time.   Skin: Skin is warm and dry.   Psychiatric: He has a normal mood and affect. His behavior is normal.   Nursing note and vitals reviewed.              Assessment/Plan:     1. Hand injury, right, initial encounter  Will have pt hold onto pain medication to use as needed. Will continue to follow. Consent signed.  - HYDROcodone-acetaminophen (NORCO) " 5-325 MG Tab per tablet; Take 1-2 Tabs by mouth every 8 hours as needed for up to 15 days.  Dispense: 40 Tab; Refill: 0  - CONSENT FOR OPIATE PRESCRIPTION    2. Diabetes 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, with long-term current use of insulin (HCC)  Will have him continue to use his medication as directed. He will also be referred to nephrology for further assistance in management of his kidney disease.   - REFERRAL TO NEPHROLOGY    3. Type 2 diabetes mellitus with diabetic neuropathy, without long-term current use of insulin (HCC)  See above plan.

## 2018-09-09 ENCOUNTER — HOSPITAL ENCOUNTER (EMERGENCY)
Facility: MEDICAL CENTER | Age: 53
End: 2018-09-09
Attending: EMERGENCY MEDICINE
Payer: MEDICAID

## 2018-09-09 ENCOUNTER — APPOINTMENT (OUTPATIENT)
Dept: RADIOLOGY | Facility: MEDICAL CENTER | Age: 53
End: 2018-09-09
Attending: EMERGENCY MEDICINE
Payer: MEDICAID

## 2018-09-09 VITALS
DIASTOLIC BLOOD PRESSURE: 94 MMHG | RESPIRATION RATE: 18 BRPM | SYSTOLIC BLOOD PRESSURE: 179 MMHG | TEMPERATURE: 98.9 F | OXYGEN SATURATION: 95 % | WEIGHT: 238.32 LBS | HEIGHT: 77 IN | BODY MASS INDEX: 28.14 KG/M2 | HEART RATE: 88 BPM

## 2018-09-09 DIAGNOSIS — J44.1 CHRONIC OBSTRUCTIVE PULMONARY DISEASE WITH ACUTE EXACERBATION (HCC): ICD-10-CM

## 2018-09-09 LAB — EKG IMPRESSION: NORMAL

## 2018-09-09 PROCEDURE — 99284 EMERGENCY DEPT VISIT MOD MDM: CPT

## 2018-09-09 PROCEDURE — 71045 X-RAY EXAM CHEST 1 VIEW: CPT

## 2018-09-09 PROCEDURE — 94640 AIRWAY INHALATION TREATMENT: CPT

## 2018-09-09 PROCEDURE — 700101 HCHG RX REV CODE 250: Performed by: EMERGENCY MEDICINE

## 2018-09-09 PROCEDURE — 99281 EMR DPT VST MAYX REQ PHY/QHP: CPT

## 2018-09-09 PROCEDURE — 93005 ELECTROCARDIOGRAM TRACING: CPT

## 2018-09-09 PROCEDURE — 94760 N-INVAS EAR/PLS OXIMETRY 1: CPT

## 2018-09-09 PROCEDURE — 700111 HCHG RX REV CODE 636 W/ 250 OVERRIDE (IP): Performed by: EMERGENCY MEDICINE

## 2018-09-09 RX ORDER — PREDNISONE 20 MG/1
60 TABLET ORAL ONCE
Status: COMPLETED | OUTPATIENT
Start: 2018-09-09 | End: 2018-09-09

## 2018-09-09 RX ORDER — PREDNISONE 20 MG/1
40 TABLET ORAL DAILY
Qty: 8 TAB | Refills: 0 | Status: SHIPPED | OUTPATIENT
Start: 2018-09-10 | End: 2018-09-14

## 2018-09-09 RX ORDER — ALBUTEROL SULFATE 90 UG/1
2 AEROSOL, METERED RESPIRATORY (INHALATION) EVERY 6 HOURS PRN
Qty: 8.5 G | Refills: 0 | Status: SHIPPED | OUTPATIENT
Start: 2018-09-09

## 2018-09-09 RX ORDER — IPRATROPIUM BROMIDE AND ALBUTEROL SULFATE 2.5; .5 MG/3ML; MG/3ML
3 SOLUTION RESPIRATORY (INHALATION)
Status: COMPLETED | OUTPATIENT
Start: 2018-09-09 | End: 2018-09-09

## 2018-09-09 RX ADMIN — IPRATROPIUM BROMIDE AND ALBUTEROL SULFATE 3 ML: .5; 3 SOLUTION RESPIRATORY (INHALATION) at 09:45

## 2018-09-09 RX ADMIN — PREDNISONE 60 MG: 20 TABLET ORAL at 10:02

## 2018-09-09 NOTE — ED NOTES
Chief Complaint   Patient presents with   • Suture Removal   • Shortness of Breath     Pt states that he is getting SOB when walking.

## 2018-09-09 NOTE — FLOWSHEET NOTE
09/09/18 0945   Events/Summary/Plan   Events/Summary/Plan ER TX   Interdisciplinary Plan of Care-Goals (Indications)   Bronchodilator Indications History / Diagnosis   Interdisciplinary Plan of Care-Outcomes    Bronchodilator Outcome Patient at Stable Baseline   Education   Education Yes - Pt. / Family has been Instructed in use of Respiratory Equipment;Yes - Pt. / Family has been Instructed in use of Respiratory Medications and Adverse Reactions   RT Assessment of Delivered Medications   Evaluation of Medication Delivery Daily Yes-- Pt /Family has been Instructed in use of Respiratory Medications and Adverse Reactions   SVN Group   #SVN Performed Yes   Given By: Mouthpiece   Date SVN Last Changed 09/09/18   Date SVN Next Change Due (Q 7 Days) 09/16/18   Respiratory WDL   Respiratory (WDL) X   Chest Exam   Respiration 18   Heart Rate (Monitored) 90   Breath Sounds   Pre/Post Intervention Pre Intervention Assessment   RUL Breath Sounds Diminished;Clear   RML Breath Sounds Diminished;Clear   RLL Breath Sounds Diminished   JOE Breath Sounds Diminished;Clear   LLL Breath Sounds Diminished   Oximetry   #Pulse Oximetry (Single Determination) Yes   Oxygen   Home O2 Use Prior To Admission? No   Pulse Oximetry 94 %   O2 (LPM) 0   O2 Daily Delivery Respiratory  Room Air with O2 Available
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
## 2018-09-09 NOTE — DISCHARGE INSTRUCTIONS
Chronic Obstructive Pulmonary Disease  Chronic obstructive pulmonary disease (COPD) is a common lung problem. In COPD, the flow of air from the lungs is limited. The way your lungs work will probably never return to normal, but there are things you can do to improve your lungs and make yourself feel better. Your doctor may treat your condition with:  · Medicines.  · Oxygen.  · Lung surgery.  · Changes to your diet.  · Rehabilitation. This may involve a team of specialists.  Follow these instructions at home:  · Take all medicines as told by your doctor.  · Avoid medicines or cough syrups that dry up your airway (such as antihistamines) and do not allow you to get rid of thick spit. You do not need to avoid them if told differently by your doctor.  · If you smoke, stop. Smoking makes the problem worse.  · Avoid being around things that make your breathing worse (like smoke, chemicals, and fumes).  · Use oxygen therapy and therapy to help improve your lungs (pulmonary rehabilitation) if told by your doctor. If you need home oxygen therapy, ask your doctor if you should buy a tool to measure your oxygen level (oximeter).  · Avoid people who have a sickness you can catch (contagious).  · Avoid going outside when it is very hot, cold, or humid.  · Eat healthy foods. Eat smaller meals more often. Rest before meals.  · Stay active, but remember to also rest.  · Make sure to get all the shots (vaccines) your doctor recommends. Ask your doctor if you need a pneumonia shot.  · Learn and use tips on how to relax.  · Learn and use tips on how to control your breathing as told by your doctor. Try:  1. Breathing in (inhaling) through your nose for 1 second. Then, pucker your lips and breath out (exhale) through your lips for 2 seconds.  2. Putting one hand on your belly (abdomen). Breathe in slowly through your nose for 1 second. Your hand on your belly should move out. Pucker your lips and breathe out slowly through your lips.  Your hand on your belly should move in as you breathe out.  · Learn and use controlled coughing to clear thick spit from your lungs. The steps are:  1. Lean your head a little forward.  2. Breathe in deeply.  3. Try to hold your breath for 3 seconds.  4. Keep your mouth slightly open while coughing 2 times.  5. Spit any thick spit out into a tissue.  6. Rest and do the steps again 1 or 2 times as needed.  Contact a doctor if:  · You cough up more thick spit than usual.  · There is a change in the color or thickness of the spit.  · It is harder to breathe than usual.  · Your breathing is faster than usual.  Get help right away if:  · You have shortness of breath while resting.  · You have shortness of breath that stops you from:  ¨ Being able to talk.  ¨ Doing normal activities.  · You chest hurts for longer than 5 minutes.  · Your skin color is more blue than usual.  · Your pulse oximeter shows that you have low oxygen for longer than 5 minutes.  This information is not intended to replace advice given to you by your health care provider. Make sure you discuss any questions you have with your health care provider.  Document Released: 06/05/2009 Document Revised: 05/25/2017 Document Reviewed: 08/14/2014  Elsevier Interactive Patient Education © 2017 [employer]

## 2018-09-10 ENCOUNTER — PATIENT OUTREACH (OUTPATIENT)
Dept: HEALTH INFORMATION MANAGEMENT | Facility: OTHER | Age: 53
End: 2018-09-10

## 2018-09-10 RX ORDER — RANITIDINE 150 MG/1
TABLET ORAL
Qty: 60 TAB | Refills: 0 | Status: SHIPPED | OUTPATIENT
Start: 2018-09-10 | End: 2018-10-03

## 2018-10-01 RX ORDER — BUDESONIDE AND FORMOTEROL FUMARATE DIHYDRATE 160; 4.5 UG/1; UG/1
AEROSOL RESPIRATORY (INHALATION)
Qty: 1 INHALER | Refills: 0 | Status: SHIPPED | OUTPATIENT
Start: 2018-10-01 | End: 2018-10-03 | Stop reason: SDUPTHER

## 2018-10-01 RX ORDER — CYCLOBENZAPRINE HCL 10 MG
TABLET ORAL
Qty: 60 TAB | Refills: 0 | Status: SHIPPED | OUTPATIENT
Start: 2018-10-01

## 2018-10-01 RX ORDER — EMPAGLIFLOZIN 25 MG/1
TABLET, FILM COATED ORAL
Qty: 90 TAB | Refills: 0 | Status: SHIPPED | OUTPATIENT
Start: 2018-10-01

## 2018-10-01 RX ORDER — RANITIDINE 150 MG/1
TABLET ORAL
Qty: 60 TAB | Refills: 0 | Status: SHIPPED | OUTPATIENT
Start: 2018-10-01 | End: 2018-10-03 | Stop reason: SDUPTHER

## 2018-10-03 ENCOUNTER — OFFICE VISIT (OUTPATIENT)
Dept: MEDICAL GROUP | Facility: MEDICAL CENTER | Age: 53
End: 2018-10-03
Attending: FAMILY MEDICINE
Payer: MEDICAID

## 2018-10-03 VITALS
WEIGHT: 232 LBS | RESPIRATION RATE: 14 BRPM | SYSTOLIC BLOOD PRESSURE: 120 MMHG | HEIGHT: 77 IN | TEMPERATURE: 97.5 F | HEART RATE: 68 BPM | BODY MASS INDEX: 27.39 KG/M2 | OXYGEN SATURATION: 92 % | DIASTOLIC BLOOD PRESSURE: 90 MMHG

## 2018-10-03 DIAGNOSIS — S69.90XD FINGER INJURY, UNSPECIFIED LATERALITY, SUBSEQUENT ENCOUNTER: ICD-10-CM

## 2018-10-03 DIAGNOSIS — Z79.4 DIABETES MELLITUS DUE TO UNDERLYING CONDITION WITH DIABETIC NEPHROPATHY, WITH LONG-TERM CURRENT USE OF INSULIN (HCC): ICD-10-CM

## 2018-10-03 DIAGNOSIS — Z23 FLU VACCINE NEED: ICD-10-CM

## 2018-10-03 DIAGNOSIS — E08.21 DIABETES MELLITUS DUE TO UNDERLYING CONDITION WITH DIABETIC NEPHROPATHY, WITH LONG-TERM CURRENT USE OF INSULIN (HCC): ICD-10-CM

## 2018-10-03 DIAGNOSIS — J43.9 PULMONARY EMPHYSEMA, UNSPECIFIED EMPHYSEMA TYPE (HCC): ICD-10-CM

## 2018-10-03 PROCEDURE — 99214 OFFICE O/P EST MOD 30 MIN: CPT | Performed by: FAMILY MEDICINE

## 2018-10-03 PROCEDURE — 90686 IIV4 VACC NO PRSV 0.5 ML IM: CPT

## 2018-10-03 PROCEDURE — 99212 OFFICE O/P EST SF 10 MIN: CPT | Mod: 25 | Performed by: FAMILY MEDICINE

## 2018-10-03 RX ORDER — TIOTROPIUM BROMIDE 18 UG/1
18 CAPSULE ORAL; RESPIRATORY (INHALATION) DAILY
Qty: 30 CAP | Refills: 6 | Status: SHIPPED | OUTPATIENT
Start: 2018-10-03

## 2018-10-03 RX ORDER — RANITIDINE 150 MG/1
TABLET ORAL
Qty: 60 TAB | Refills: 6 | Status: SHIPPED | OUTPATIENT
Start: 2018-10-03

## 2018-10-03 RX ORDER — ALBUTEROL SULFATE 2.5 MG/3ML
2.5 SOLUTION RESPIRATORY (INHALATION) EVERY 6 HOURS PRN
Qty: 30 BULLET | Refills: 3 | Status: SHIPPED | OUTPATIENT
Start: 2018-10-03 | End: 2019-01-23 | Stop reason: SDUPTHER

## 2018-10-03 RX ORDER — BUDESONIDE AND FORMOTEROL FUMARATE DIHYDRATE 160; 4.5 UG/1; UG/1
AEROSOL RESPIRATORY (INHALATION)
Qty: 1 INHALER | Refills: 6 | Status: SHIPPED | OUTPATIENT
Start: 2018-10-03

## 2018-10-03 ASSESSMENT — ENCOUNTER SYMPTOMS
VOMITING: 0
NAUSEA: 0
CHILLS: 0
SPEECH CHANGE: 0
HEADACHES: 0
ABDOMINAL PAIN: 0
SPUTUM PRODUCTION: 0
COUGH: 0
FEVER: 0
PSYCHIATRIC NEGATIVE: 1
HEARTBURN: 1
PALPITATIONS: 0
TREMORS: 0
TINGLING: 1
SHORTNESS OF BREATH: 0
SENSORY CHANGE: 0

## 2018-10-03 NOTE — PROGRESS NOTES
Subjective:      Wagner Kenney is a 53 y.o. male who presents with Follow-Up (cut on finger) and Immunizations (flu shot)            Patient 53-year-old male here for follow-up of his recent finger injury secondary to a kitchen knife, COPD, GERD and need for flu vaccine.    His recent finger injury has healed well. Patient has full use of his hand, he is able to flex and extend his fingers without any problems. There are no signs of infection and no discharge from the wound. Wound is completely healed over. There is some increased sensitivity over the area of the wound but otherwise everything has returned to normal. We'll continue to follow.    He would like to get the flu shot today. He has had the flu shot in the past. He is not allergic to eggs, no recent fevers and no history of GBS.    He will also need his inhalers refilled today. He has been using them as directed and has had no recent flareups of his COPD. He would also like to get a prescription for a nebulizer. He'll get his nebulizer prescriptions sent to [facility] to be filled. We'll continue to follow.    He will also need his ranitidine filled today. He has been using it as directed and has not no recent problems with his acid reflux. We'll continue to follow.     Current medications, allergies, and problem list reviewed with patient and updated in EPIC.          Review of Systems   Constitutional: Negative for chills and fever.   HENT: Negative for hearing loss and tinnitus.    Respiratory: Negative for cough, sputum production and shortness of breath.    Cardiovascular: Negative for chest pain and palpitations.   Gastrointestinal: Positive for heartburn. Negative for abdominal pain, nausea and vomiting.   Musculoskeletal: Negative for joint pain.   Skin: Negative for rash.   Neurological: Positive for tingling. Negative for tremors, sensory change, speech change and headaches.   Psychiatric/Behavioral: Negative.           Objective:     BP  "120/90 (BP Location: Left arm, Patient Position: Sitting)   Pulse 68   Temp 36.4 °C (97.5 °F)   Resp 14   Ht 1.956 m (6' 5\")   Wt 105.2 kg (232 lb)   SpO2 92%   BMI 27.51 kg/m²      Physical Exam   Constitutional: He is oriented to person, place, and time.   BMI 27   HENT:   Head: Normocephalic and atraumatic.   Cardiovascular: Normal rate, regular rhythm and normal heart sounds.  Exam reveals no friction rub.    No murmur heard.  Pulmonary/Chest: Effort normal and breath sounds normal. No respiratory distress. He has no wheezes. He has no rales.   Abdominal: Soft. Bowel sounds are normal. He exhibits no distension. There is no tenderness.   Musculoskeletal: He exhibits tenderness. He exhibits no edema.   Over area of prior wound on finger, full ROM in flexion and extension of fingers   Neurological: He is alert and oriented to person, place, and time.   Skin: Skin is warm and dry.   Psychiatric: He has a normal mood and affect. His behavior is normal.   Nursing note and vitals reviewed.              Assessment/Plan:     1. Flu vaccine need  He would like to get a flu shot, he understands the risks and benefits of the flu shot. No recent fevers, no egg allergies, and no hx of GBS.    - Flu Quad Inj >3 Year Pre-Filled PF    2. Diabetes 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, with long-term current use of insulin (HCC)  Will have him continue to use his medications as directed. Will also have him continue to check his hands and feet daily for any new changes in sensation or breakdown of skin.    3. Finger injury, unspecified laterality, subsequent encounter  Healed well we'll continue to follow.    4. Pulmonary emphysema, unspecified emphysema type (HCC)  His inhalers will be sent to his pharmacy for him to continue using as directed. Nebulizer prescription was also sent to his medical supply carrier. We'll continue to follow.  - Misc. Devices Misc; Nebulizer to use as directed  " Dispense: 1 Device; Refill: 0

## 2018-12-28 NOTE — TELEPHONE ENCOUNTER
Was the patient seen in the last year in this department? Yes    Does patient have an active prescription for medications requested? No     Received Request Via: Pharmacy     ONETOUCH VERIO strip  USE TO TEST BLOOD SUGAR 3-4 TIMES A DAY IF NEEDED

## 2018-12-31 RX ORDER — BLOOD SUGAR DIAGNOSTIC
STRIP MISCELLANEOUS
Qty: 150 STRIP | Refills: 11 | Status: SHIPPED | OUTPATIENT
Start: 2018-12-31

## 2019-01-21 ENCOUNTER — OFFICE VISIT (OUTPATIENT)
Dept: CARDIOLOGY | Facility: MEDICAL CENTER | Age: 54
End: 2019-01-21
Payer: MEDICAID

## 2019-01-21 VITALS
OXYGEN SATURATION: 100 % | SYSTOLIC BLOOD PRESSURE: 136 MMHG | HEIGHT: 77 IN | BODY MASS INDEX: 26.68 KG/M2 | DIASTOLIC BLOOD PRESSURE: 80 MMHG | HEART RATE: 78 BPM | WEIGHT: 226 LBS

## 2019-01-21 DIAGNOSIS — E11.69 HYPERLIPIDEMIA ASSOCIATED WITH TYPE 2 DIABETES MELLITUS (HCC): ICD-10-CM

## 2019-01-21 DIAGNOSIS — I25.10 CORONARY ARTERY DISEASE INVOLVING NATIVE HEART WITHOUT ANGINA PECTORIS, UNSPECIFIED VESSEL OR LESION TYPE: ICD-10-CM

## 2019-01-21 DIAGNOSIS — E78.5 HYPERLIPIDEMIA ASSOCIATED WITH TYPE 2 DIABETES MELLITUS (HCC): ICD-10-CM

## 2019-01-21 DIAGNOSIS — I10 ESSENTIAL HYPERTENSION, BENIGN: ICD-10-CM

## 2019-01-21 PROCEDURE — 99213 OFFICE O/P EST LOW 20 MIN: CPT | Performed by: INTERNAL MEDICINE

## 2019-01-21 NOTE — PROGRESS NOTES
Chief Complaint   Patient presents with   • Coronary Artery Disease     x6mon. f/v       Subjective:   Wagner Kenney is a 53 y.o. male who presents today in follow-up of hypertension and coronary artery disease.  Patient is asymptomatic and does quite a bit of walking.  He is unable to check his blood pressure at home but it tends to run about 140/80 at physician offices.  He is pretty generous with the amount of salt that he uses.  He is good about taking amlodipine 10 mg a day and lisinopril 40 mg a day.  He says his diabetes has been improved.  He walks without shortness of breath or chest discomfort.  Previous studies have revealed inferior defect on nuclear perfusion study without a history of prior myocardial infarction.  His EKG reveals Q waves in inferior leads and loss of inferior forces consistent with inferior MI.  His echocardiogram does not  reveal wall motion abnormality and his ejection fraction is estimated at 60%.  My suspicion that it is more like 50-55% with inferobasal hypokinesis.    Past Medical History:   Diagnosis Date   • Arthritis    • Asthma 05/08/2018    Inhaler use daily.   • Chronic LBP    • Chronic shoulder pain    • COPD (chronic obstructive pulmonary disease) (Formerly [facility]) 3/31/10    mild   • Dental disorder    • DM (diabetes mellitus) (Formerly [facility])     TYPE II,diet controlled   • Heart burn    • HTN (hypertension)    • Kidney disease    • Pain 05/08/2018    Shoulders, legs and back.     Past Surgical History:   Procedure Laterality Date   • OTHER ORTHOPEDIC SURGERY  knee     Family History   Problem Relation Age of Onset   • Hypertension Mother    • Diabetes Father    • Hypertension Father    • Diabetes Sister      Social History     Social History   • Marital status: Single     Spouse name: N/A   • Number of children: N/A   • Years of education: N/A     Occupational History   • Not on file.     Social History Main Topics   • Smoking status: Current Every Day Smoker     Packs/day: 0.50      "Years: 25.00     Types: Cigarettes   • Smokeless tobacco: Never Used      Comment: 6/28 Still smoking and working on smoking cessation.   • Alcohol use 0.0 - 0.6 oz/week      Comment: Pint/week   • Drug use: No   • Sexual activity: Not on file     Other Topics Concern   •  Service No   • Blood Transfusions No   • Caffeine Concern No   • Occupational Exposure No   • Hobby Hazards No   • Sleep Concern No     hard to sleep   • Stress Concern No   • Weight Concern No   • Special Diet Yes     diabetic    • Back Care Yes   • Exercise Yes     little bit   • Bike Helmet No     does not ride bike    • Seat Belt Yes   • Self-Exams No     Social History Narrative   • No narrative on file     Allergies   Allergen Reactions   • Apple Swelling     Per patient: swelling of mouth and jittery feeling.  Apple allergy to raw apples; apple juice and applesauce okay per patient   • Asa [Aspirin]      \"funny taste in my mouth. My stomach has an 'empty' feeling.\"  \"throat closing\"   • Metformin Diarrhea     Pt states he \"cramps up\"     Outpatient Encounter Prescriptions as of 1/21/2019   Medication Sig Dispense Refill   • hydrOXYzine HCl (ATARAX) 25 MG Tab TAKE 1 TABLET ORALLY 3 TIMES DAILY IF NEEDED FOR ITCHING 30 Tab 0   • atorvastatin (LIPITOR) 20 MG Tab TAKE 1 TABLET ORALLY ONCE DAILY 30 Tab 0   • ONETOUCH VERIO strip USE TO TEST BLOOD SUGAR 3-4 TIMES A DAY IF NEEDED 150 Strip 11   • raNITidine (ZANTAC) 150 MG Tab TAKE 1 TABLET ORALLY 2 TIMES DAILY IF NEEDED FOR HEARTBURN 60 Tab 6   • tiotropium (SPIRIVA) 18 MCG Cap Inhale 1 Cap by mouth every day. 30 Cap 6   • budesonide-formoterol (SYMBICORT) 160-4.5 MCG/ACT Aerosol INHALE 2 PUFFS ORALLY ONE DAILY AS NEEDED FOR SHORTNESS OF BREATH 1 Inhaler 6   • Misc. Devices Misc Nebulizer to use as directed 1 Device 0   • JARDIANCE 25 MG Tab TAKE 1 TABLET ORALLY ONCE DAILY 90 Tab 0   • cyclobenzaprine (FLEXERIL) 10 MG Tab TAKE 1/2 TO 1 TABLET ORALLY 2 TIMES DAILY IF NEEDED FOR MUSCLE " "SPASM 60 Tab 0   • allopurinol (ZYLOPRIM) 100 MG Tab Take 1 Tab by mouth every day. 30 Tab 3   • lisinopril (PRINIVIL, ZESTRIL) 40 MG tablet TAKE 1 TABLET ORALLY ONCE DAILY 90 Tab 2   • clopidogrel (PLAVIX) 75 MG Tab Take 1 Tab by mouth every day. 30 Tab 11   • Insulin Degludec (TRESIBA FLEXTOUCH) 200 UNIT/ML Solution Pen-injector Inject 50 Units as instructed every bedtime. 3 PEN 2   • VICTOZA 18 MG/3ML Solution Pen-injector injection Inject 0.3 mL as instructed every day. 3 PEN 6   • pioglitazone (ACTOS) 30 MG Tab Take 1 Tab by mouth every day. 30 Tab 11   • amlodipine (NORVASC) 10 MG Tab Take 1 Tab by mouth every day. 90 Tab 1   • raNITidine (ZANTAC) 150 MG Tab TAKE 1 TABLET ORALLY 2 TIMES DAILY IF NEEDED FOR HEARTBURN 60 Tab 0   • albuterol (PROVENTIL) 2.5mg/3ml Nebu Soln solution for nebulization 3 mL by Nebulization route every 6 hours as needed for Shortness of Breath. 30 Bullet 3   • albuterol 108 (90 Base) MCG/ACT Aero Soln inhalation aerosol Inhale 2 Puffs by mouth every 6 hours as needed for Shortness of Breath. 8.5 g 0   • nicotine (NICODERM) 14 MG/24HR PATCH 24 HR Apply 1 Patch to skin as directed every 24 hours. 30 Patch 3     No facility-administered encounter medications on file as of 1/21/2019.      ROS.  See HPI.  No orthopnea, PND, pedal edema.  No stroke or TIA symptoms.  No claudication.     Objective:   /80 (BP Location: Left arm, Patient Position: Sitting, BP Cuff Size: Adult)   Pulse 78   Ht 1.956 m (6' 5\")   Wt 102.5 kg (226 lb)   SpO2 100%   BMI 26.80 kg/m²     Physical Exam General: WD, WN, male in NAD. Weight is down 6 pounds  Neck: No JVD.  Good carotid upstroke and no bruit  Chest: Clear to A & P  Heart: Regular rhythm, Normal S1 and S2. No murmur, gallop, rub, click  Ext: No edema, palpable posterior tibial pulses bilaterally  Neuro: Alert, oriented  Psych: normal mood, affect    Assessment:     1. Coronary artery disease involving native heart without angina pectoris, " unspecified vessel or lesion type     2. Essential hypertension, benign     3. Hyperlipidemia associated with type 2 diabetes mellitus (HCC)         Medical Decision Making:  Today's Assessment / Status / Plan:   The patient's blood pressure is almost ideal.  I suggested that he reduce his salt intake at the table.  If that is not satisfactory a mild diuretic probably be recommended.  He will follow-up with Dr. Lara on a regular basis.    His coronary artery disease is asymptomatic.  He is walking quite a bit without symptoms.  I suggested he continue to do so to be able to recognize if he develops ischemic symptoms which may not be chest pain, pressure, tightness.  He may only notice a change in his exercise tolerance.  If that occurs the patient needs to make an appointment sooner than regularly scheduled.  He will return in 6 months to 1 year.    He is taking atorvastatin but has not had recent lipid levels.  This can be followed with Dr. Lara.  The patient will continue taking care of his diabetes.

## 2019-01-23 DIAGNOSIS — E78.5 HYPERLIPIDEMIA WITH TARGET LDL LESS THAN 100: ICD-10-CM

## 2019-01-23 RX ORDER — RANITIDINE 150 MG/1
TABLET ORAL
Qty: 60 TAB | Refills: 2 | Status: SHIPPED | OUTPATIENT
Start: 2019-01-23

## 2019-01-23 RX ORDER — HYDROXYZINE HYDROCHLORIDE 25 MG/1
TABLET, FILM COATED ORAL
Qty: 30 TAB | Refills: 2 | Status: SHIPPED | OUTPATIENT
Start: 2019-01-23

## 2019-01-23 RX ORDER — ALBUTEROL SULFATE 2.5 MG/3ML
SOLUTION RESPIRATORY (INHALATION)
Qty: 1 BULLET | Refills: 2 | Status: SHIPPED | OUTPATIENT
Start: 2019-01-23

## 2019-01-23 RX ORDER — ATORVASTATIN CALCIUM 20 MG/1
TABLET, FILM COATED ORAL
Qty: 30 TAB | Refills: 2 | Status: SHIPPED | OUTPATIENT
Start: 2019-01-23
